# Patient Record
Sex: FEMALE | Race: WHITE | NOT HISPANIC OR LATINO | Employment: FULL TIME | URBAN - METROPOLITAN AREA
[De-identification: names, ages, dates, MRNs, and addresses within clinical notes are randomized per-mention and may not be internally consistent; named-entity substitution may affect disease eponyms.]

---

## 2018-02-07 ENCOUNTER — TELEPHONE (OUTPATIENT)
Dept: INTERNAL MEDICINE | Facility: CLINIC | Age: 58
End: 2018-02-07

## 2018-02-07 DIAGNOSIS — Z12.31 ENCOUNTER FOR SCREENING MAMMOGRAM FOR BREAST CANCER: Primary | ICD-10-CM

## 2018-02-07 NOTE — TELEPHONE ENCOUNTER
----- Message from Mohinder Brooks sent at 2/7/2018  3:48 PM CST -----  Contact: self [phone]  Would like to have orders for annual mammogram.  Please call back at [phone].  Md Kavya

## 2018-02-07 NOTE — TELEPHONE ENCOUNTER
I pended order for mammogram. I didn't sign it because pt's last visit was 11/2016. If it's ok to have mammogram. Please sign the order.

## 2018-02-08 NOTE — TELEPHONE ENCOUNTER
Called pt and booked mammogram for 2-26-18 at 7:45 am at [facility] and annual exam for 2-28-18 at 7:20 am.

## 2018-02-19 ENCOUNTER — PATIENT OUTREACH (OUTPATIENT)
Dept: ADMINISTRATIVE | Facility: HOSPITAL | Age: 58
End: 2018-02-19

## 2018-02-19 DIAGNOSIS — Z00.00 ANNUAL PHYSICAL EXAM: Primary | ICD-10-CM

## 2018-02-26 ENCOUNTER — HOSPITAL ENCOUNTER (OUTPATIENT)
Dept: RADIOLOGY | Facility: HOSPITAL | Age: 58
Discharge: HOME | End: 2018-02-26
Attending: FAMILY MEDICINE
Payer: COMMERCIAL

## 2018-02-26 DIAGNOSIS — Z12.31 ENCOUNTER FOR SCREENING MAMMOGRAM FOR BREAST CANCER: ICD-10-CM

## 2018-02-26 PROCEDURE — 77067 SCR MAMMO BI INCL CAD: CPT | Mod: 26,,, | Performed by: RADIOLOGY

## 2018-02-26 PROCEDURE — 77063 BREAST TOMOSYNTHESIS BI: CPT | Mod: 26,,, | Performed by: RADIOLOGY

## 2018-02-26 PROCEDURE — 77067 SCR MAMMO BI INCL CAD: CPT | Mod: TC,PO

## 2018-02-28 ENCOUNTER — LAB VISIT (OUTPATIENT)
Dept: LAB | Facility: HOSPITAL | Age: 58
End: 2018-02-28
Attending: FAMILY MEDICINE
Payer: COMMERCIAL

## 2018-02-28 ENCOUNTER — OFFICE VISIT (OUTPATIENT)
Dept: INTERNAL MEDICINE | Facility: CLINIC | Age: 58
End: 2018-02-28
Payer: COMMERCIAL

## 2018-02-28 VITALS
HEART RATE: 76 BPM | TEMPERATURE: 98 F | DIASTOLIC BLOOD PRESSURE: 80 MMHG | SYSTOLIC BLOOD PRESSURE: 122 MMHG | BODY MASS INDEX: 34.52 KG/M2 | HEIGHT: 64 IN | WEIGHT: 202.19 LBS

## 2018-02-28 DIAGNOSIS — Z00.00 ROUTINE GENERAL MEDICAL EXAMINATION AT A HEALTH CARE FACILITY: ICD-10-CM

## 2018-02-28 DIAGNOSIS — G89.29 CHRONIC PAIN OF BOTH KNEES: ICD-10-CM

## 2018-02-28 DIAGNOSIS — G47.33 OSA ON CPAP: ICD-10-CM

## 2018-02-28 DIAGNOSIS — M25.562 CHRONIC PAIN OF BOTH KNEES: ICD-10-CM

## 2018-02-28 DIAGNOSIS — Z00.00 ANNUAL PHYSICAL EXAM: ICD-10-CM

## 2018-02-28 DIAGNOSIS — M25.561 CHRONIC PAIN OF BOTH KNEES: ICD-10-CM

## 2018-02-28 DIAGNOSIS — Z00.00 ROUTINE GENERAL MEDICAL EXAMINATION AT A HEALTH CARE FACILITY: Primary | ICD-10-CM

## 2018-02-28 LAB
ALBUMIN SERPL BCP-MCNC: 3.8 G/DL
ALP SERPL-CCNC: 58 U/L
ALT SERPL W/O P-5'-P-CCNC: 21 U/L
ANION GAP SERPL CALC-SCNC: 12 MMOL/L
AST SERPL-CCNC: 24 U/L
BASOPHILS # BLD AUTO: 0.03 K/UL
BASOPHILS NFR BLD: 0.6 %
BILIRUB SERPL-MCNC: 0.6 MG/DL
BUN SERPL-MCNC: 7 MG/DL
CALCIUM SERPL-MCNC: 9.6 MG/DL
CHLORIDE SERPL-SCNC: 104 MMOL/L
CHOLEST SERPL-MCNC: 268 MG/DL
CHOLEST/HDLC SERPL: 5.7 {RATIO}
CO2 SERPL-SCNC: 27 MMOL/L
CREAT SERPL-MCNC: 0.6 MG/DL
DIFFERENTIAL METHOD: NORMAL
EOSINOPHIL # BLD AUTO: 0.2 K/UL
EOSINOPHIL NFR BLD: 2.8 %
ERYTHROCYTE [DISTWIDTH] IN BLOOD BY AUTOMATED COUNT: 13.5 %
EST. GFR  (AFRICAN AMERICAN): >60 ML/MIN/1.73 M^2
EST. GFR  (NON AFRICAN AMERICAN): >60 ML/MIN/1.73 M^2
ESTIMATED AVG GLUCOSE: 148 MG/DL
GLUCOSE SERPL-MCNC: 116 MG/DL
HBA1C MFR BLD HPLC: 6.8 %
HCT VFR BLD AUTO: 38.3 %
HDLC SERPL-MCNC: 47 MG/DL
HDLC SERPL: 17.5 %
HGB BLD-MCNC: 12.5 G/DL
IMM GRANULOCYTES # BLD AUTO: 0.02 K/UL
IMM GRANULOCYTES NFR BLD AUTO: 0.4 %
LDLC SERPL CALC-MCNC: 166 MG/DL
LYMPHOCYTES # BLD AUTO: 2 K/UL
LYMPHOCYTES NFR BLD: 38.3 %
MCH RBC QN AUTO: 30.6 PG
MCHC RBC AUTO-ENTMCNC: 32.6 G/DL
MCV RBC AUTO: 94 FL
MONOCYTES # BLD AUTO: 0.4 K/UL
MONOCYTES NFR BLD: 6.8 %
NEUTROPHILS # BLD AUTO: 2.7 K/UL
NEUTROPHILS NFR BLD: 51.1 %
NONHDLC SERPL-MCNC: 221 MG/DL
NRBC BLD-RTO: 0 /100 WBC
PLATELET # BLD AUTO: 253 K/UL
PMV BLD AUTO: 10.5 FL
POTASSIUM SERPL-SCNC: 4.1 MMOL/L
PROT SERPL-MCNC: 7.2 G/DL
RBC # BLD AUTO: 4.09 M/UL
SODIUM SERPL-SCNC: 143 MMOL/L
TRIGL SERPL-MCNC: 275 MG/DL
TSH SERPL DL<=0.005 MIU/L-ACNC: 1.99 UIU/ML
WBC # BLD AUTO: 5.28 K/UL

## 2018-02-28 PROCEDURE — 84443 ASSAY THYROID STIM HORMONE: CPT

## 2018-02-28 PROCEDURE — 83036 HEMOGLOBIN GLYCOSYLATED A1C: CPT

## 2018-02-28 PROCEDURE — 99396 PREV VISIT EST AGE 40-64: CPT | Mod: S$GLB,,, | Performed by: FAMILY MEDICINE

## 2018-02-28 PROCEDURE — 36415 COLL VENOUS BLD VENIPUNCTURE: CPT | Mod: PO

## 2018-02-28 PROCEDURE — 80053 COMPREHEN METABOLIC PANEL: CPT

## 2018-02-28 PROCEDURE — 80061 LIPID PANEL: CPT

## 2018-02-28 PROCEDURE — 99999 PR PBB SHADOW E&M-EST. PATIENT-LVL III: CPT | Mod: PBBFAC,,, | Performed by: FAMILY MEDICINE

## 2018-02-28 PROCEDURE — 85025 COMPLETE CBC W/AUTO DIFF WBC: CPT

## 2018-02-28 RX ORDER — MELOXICAM 15 MG/1
15 TABLET ORAL DAILY PRN
Qty: 30 TABLET | Refills: 3 | Status: SHIPPED | OUTPATIENT
Start: 2018-02-28 | End: 2019-03-27 | Stop reason: SDUPTHER

## 2018-02-28 NOTE — PROGRESS NOTES
"Subjective:      Patient ID: Shirley Amaya is a 58 y.o. female.    Chief Complaint: Annual Exam    HPI  59 yo female here for annual.  Uses mobic infrequently, needs refill.  Has YUDITH, last test yrs and yrs ago.  Needs updated test/supplies  Up to date on preventive measures  Wants to focus on weight loss    Past Medical History:   Diagnosis Date    OA (osteoarthritis) of knee     YUDITH on CPAP      Family History   Problem Relation Age of Onset    Diabetes Father     Lung cancer Sister     Stomach cancer Brother     Heart disease Neg Hx     Hypertension Neg Hx     Colon cancer Neg Hx     Breast cancer Neg Hx      Past Surgical History:   Procedure Laterality Date    CERVICAL CONIZATION   W/ LASER      COLONOSCOPY N/A 11/30/2016    Procedure: COLONOSCOPY;  Surgeon: Mary Victor MD;  Location: [facility];  Service: Endoscopy;  Laterality: N/A;     Social History   Substance Use Topics    Smoking status: Former Smoker     Packs/day: 2.00     Years: 25.00     Quit date: 4/28/2000    Smokeless tobacco: Never Used    Alcohol use No       /80   Pulse 76   Temp 97.8 °F (36.6 °C) (Tympanic)   Ht 5' 4" (1.626 m)   Wt 91.7 kg (202 lb 2.6 oz)   BMI 34.70 kg/m²     Review of Systems   Constitutional: Negative for activity change, appetite change, chills, diaphoresis, fatigue, fever and unexpected weight change.   HENT: Negative for ear pain, hearing loss, postnasal drip, rhinorrhea and tinnitus.    Eyes: Negative for visual disturbance.   Respiratory: Negative for cough, shortness of breath and wheezing.    Cardiovascular: Negative for chest pain, palpitations and leg swelling.   Gastrointestinal: Negative for abdominal distention, abdominal pain and diarrhea.   Genitourinary: Negative for dysuria, frequency, hematuria and urgency.   Musculoskeletal: Negative for back pain and joint swelling.   Skin: Negative for rash.   Neurological: Negative for weakness and headaches.   Hematological: " Negative for adenopathy.   Psychiatric/Behavioral: Negative for confusion and dysphoric mood.       Objective:     Physical Exam   Constitutional: She is oriented to person, place, and time. She appears well-developed and well-nourished. No distress.   HENT:   Right Ear: External ear normal.   Left Ear: External ear normal.   Nose: Nose normal.   Mouth/Throat: Oropharynx is clear and moist.   Eyes: Conjunctivae are normal. Pupils are equal, round, and reactive to light.   Neck: Normal range of motion. Neck supple. Carotid bruit is not present. No thyromegaly present.   Cardiovascular: Normal rate, regular rhythm and normal heart sounds.  Exam reveals no gallop.    No murmur heard.  Pulmonary/Chest: Effort normal and breath sounds normal. No respiratory distress. She has no wheezes. She has no rales.   Abdominal: Soft. Bowel sounds are normal. She exhibits no distension. There is no tenderness. There is no guarding.   Musculoskeletal: Normal range of motion. She exhibits edema.   BL LE with trace swelling   Lymphadenopathy:     She has no cervical adenopathy.   Neurological: She is alert and oriented to person, place, and time. No cranial nerve deficit.   Skin: Skin is warm and dry. No rash noted.   Psychiatric: She has a normal mood and affect. Her behavior is normal. Judgment and thought content normal.   Nursing note and vitals reviewed.      Lab Results   Component Value Date    WBC 5.13 11/08/2016    HGB 12.1 11/08/2016    HCT 37.8 11/08/2016     11/08/2016    CHOL 253 (H) 11/08/2016    TRIG 265 (H) 11/08/2016    HDL 43 11/08/2016    ALT 25 11/08/2016    AST 26 11/08/2016     11/08/2016    K 4.5 11/08/2016     11/08/2016    CREATININE 0.7 11/08/2016    BUN 8 11/08/2016    CO2 25 11/08/2016    TSH 1.845 11/08/2016       Assessment:     1. Routine general medical examination at a health care facility    2. YUDITH on CPAP    3. Chronic pain of both knees         Plan:     Routine general medical  examination at a health care facility  -     Hemoglobin A1c; Future; Expected date: 08/30/2018    YUDITH on CPAP  -     Ambulatory consult to Sleep Disorders    Chronic pain of both knees  -     meloxicam (MOBIC) 15 MG tablet; Take 1 tablet (15 mg total) by mouth daily as needed for Pain.  Dispense: 30 tablet; Refill: 3    Update labs  Update sleep study/supplies  Focus on more exercise and better eating habits  F/u annually and PRN  Mobic refilled

## 2018-03-12 ENCOUNTER — OFFICE VISIT (OUTPATIENT)
Dept: INTERNAL MEDICINE | Facility: CLINIC | Age: 58
End: 2018-03-12
Payer: COMMERCIAL

## 2018-03-12 VITALS
HEART RATE: 76 BPM | TEMPERATURE: 98 F | DIASTOLIC BLOOD PRESSURE: 80 MMHG | WEIGHT: 196.19 LBS | SYSTOLIC BLOOD PRESSURE: 118 MMHG | HEIGHT: 64 IN | BODY MASS INDEX: 33.49 KG/M2

## 2018-03-12 DIAGNOSIS — E66.9 OBESITY, UNSPECIFIED CLASSIFICATION, UNSPECIFIED OBESITY TYPE, UNSPECIFIED WHETHER SERIOUS COMORBIDITY PRESENT: ICD-10-CM

## 2018-03-12 DIAGNOSIS — R73.09 ABNORMAL GLUCOSE: Primary | ICD-10-CM

## 2018-03-12 DIAGNOSIS — E78.5 HYPERLIPIDEMIA, UNSPECIFIED HYPERLIPIDEMIA TYPE: ICD-10-CM

## 2018-03-12 PROCEDURE — 99999 PR PBB SHADOW E&M-EST. PATIENT-LVL III: CPT | Mod: PBBFAC,,, | Performed by: FAMILY MEDICINE

## 2018-03-12 PROCEDURE — 99214 OFFICE O/P EST MOD 30 MIN: CPT | Mod: S$GLB,,, | Performed by: FAMILY MEDICINE

## 2018-03-12 RX ORDER — METFORMIN HYDROCHLORIDE 500 MG/1
500 TABLET ORAL 2 TIMES DAILY WITH MEALS
Qty: 60 TABLET | Refills: 6 | Status: SHIPPED | OUTPATIENT
Start: 2018-03-12 | End: 2018-11-10 | Stop reason: SDUPTHER

## 2018-03-12 NOTE — PROGRESS NOTES
"Subjective:      Patient ID: Shirley Amaya is a 58 y.o. female.    Chief Complaint:  F/U labs    HPI  57 yo female here for f/u from recent labs for annual.  Sugar was high as was cholesterol.  Since seeing labs, pt has started cutting out her sugar and has lost 6 lbs already.  She is ready to make a lifestyle change, worried about her health.    Past Medical History:   Diagnosis Date    OA (osteoarthritis) of knee     YUDITH on CPAP      Family History   Problem Relation Age of Onset    Diabetes Father     Lung cancer Sister     Stomach cancer Brother     Heart disease Neg Hx     Hypertension Neg Hx     Colon cancer Neg Hx     Breast cancer Neg Hx      Past Surgical History:   Procedure Laterality Date    CERVICAL CONIZATION   W/ LASER      COLONOSCOPY N/A 11/30/2016    Procedure: COLONOSCOPY;  Surgeon: Mary Victor MD;  Location: [facility];  Service: Endoscopy;  Laterality: N/A;     Social History   Substance Use Topics    Smoking status: Former Smoker     Packs/day: 2.00     Years: 25.00     Quit date: 4/28/2000    Smokeless tobacco: Never Used    Alcohol use No       /80   Pulse 76   Temp 98.1 °F (36.7 °C) (Tympanic)   Ht 5' 4" (1.626 m)   Wt 89 kg (196 lb 3.4 oz)   BMI 33.68 kg/m²     Review of Systems   Constitutional: Negative for activity change and unexpected weight change.   HENT: Negative for hearing loss, rhinorrhea and trouble swallowing.    Eyes: Negative for discharge and visual disturbance.   Respiratory: Negative for chest tightness and wheezing.    Cardiovascular: Negative for chest pain and palpitations.   Gastrointestinal: Negative for blood in stool, constipation, diarrhea and vomiting.   Endocrine: Negative for polydipsia and polyuria.   Genitourinary: Negative for difficulty urinating, dysuria, hematuria and menstrual problem.   Musculoskeletal: Negative for arthralgias, joint swelling and neck pain.   Neurological: Negative for weakness and headaches. "   Psychiatric/Behavioral: Negative for confusion and dysphoric mood.       Objective:     Physical Exam   Constitutional: She is oriented to person, place, and time. She appears well-developed and well-nourished.   Neurological: She is alert and oriented to person, place, and time.   Psychiatric: She has a normal mood and affect. Her behavior is normal. Judgment and thought content normal.   Nursing note and vitals reviewed.      Lab Results   Component Value Date    WBC 5.28 02/28/2018    HGB 12.5 02/28/2018    HCT 38.3 02/28/2018     02/28/2018    CHOL 268 (H) 02/28/2018    TRIG 275 (H) 02/28/2018    HDL 47 02/28/2018    ALT 21 02/28/2018    AST 24 02/28/2018     02/28/2018    K 4.1 02/28/2018     02/28/2018    CREATININE 0.6 02/28/2018    BUN 7 02/28/2018    CO2 27 02/28/2018    TSH 1.988 02/28/2018    HGBA1C 6.8 (H) 02/28/2018       Assessment:     1. Abnormal glucose    2. Hyperlipidemia, unspecified hyperlipidemia type    3. Obesity, unspecified classification, unspecified obesity type, unspecified whether serious comorbidity present         Plan:     Abnormal glucose  -     Hemoglobin A1c; Future; Expected date: 09/12/2018    Hyperlipidemia, unspecified hyperlipidemia type  -     Lipid panel; Future; Expected date: 09/12/2018    Obesity, unspecified classification, unspecified obesity type, unspecified whether serious comorbidity present    Other orders  -     metFORMIN (GLUCOPHAGE) 500 MG tablet; Take 1 tablet (500 mg total) by mouth 2 (two) times daily with meals.  Dispense: 60 tablet; Refill: 6    Reviewed labs in detail  Discussed lifestyle changes, including cutting out sugars and following a low carb diet.  Advised pt that she should also follow a low fat/high fiber/plant based diet to help reduce the cholesterol  Increase water intake and exercise  Will start metformin once daily, if tolerable bid.  Avoid on empty stomach  F/u 6 mos with labs prior

## 2018-03-14 ENCOUNTER — PATIENT MESSAGE (OUTPATIENT)
Dept: INTERNAL MEDICINE | Facility: CLINIC | Age: 58
End: 2018-03-14

## 2018-03-20 ENCOUNTER — TELEPHONE (OUTPATIENT)
Dept: PULMONOLOGY | Facility: CLINIC | Age: 58
End: 2018-03-20

## 2018-04-11 ENCOUNTER — OFFICE VISIT (OUTPATIENT)
Dept: SLEEP MEDICINE | Facility: CLINIC | Age: 58
End: 2018-04-11
Payer: COMMERCIAL

## 2018-04-11 VITALS
OXYGEN SATURATION: 95 % | HEIGHT: 64 IN | BODY MASS INDEX: 32.63 KG/M2 | RESPIRATION RATE: 17 BRPM | SYSTOLIC BLOOD PRESSURE: 110 MMHG | DIASTOLIC BLOOD PRESSURE: 76 MMHG | WEIGHT: 191.13 LBS | HEART RATE: 95 BPM

## 2018-04-11 DIAGNOSIS — G47.33 OSA (OBSTRUCTIVE SLEEP APNEA): Primary | ICD-10-CM

## 2018-04-11 PROCEDURE — 99243 OFF/OP CNSLTJ NEW/EST LOW 30: CPT | Mod: S$GLB,,, | Performed by: NURSE PRACTITIONER

## 2018-04-11 PROCEDURE — 99999 PR PBB SHADOW E&M-EST. PATIENT-LVL III: CPT | Mod: PBBFAC,,, | Performed by: NURSE PRACTITIONER

## 2018-04-11 NOTE — PROGRESS NOTES
"Subjective:      Patient ID: Shirley Amaya is a 58 y.o. female.    Chief Complaint: Sleep Apnea    Patient with obstructive sleep apnea on CPAP percent to the office today as a consult.  Patient was diagnosed with sleep apnea 15-16 years ago at an outside facility she wears his CPAP nightly, but needs new supplies in new machine.  She needs an updated sleep study for evaluation.  She benefits greatly from CPAP.  If she does not wear CPAP she has snoring, witnessed apneas, daytime hypersomnolence    Patient Active Problem List:     Chronic pain of both knees     YUDITH on CPAP     Screen for colon cancer            /76   Pulse 95   Resp 17   Ht 5' 4" (1.626 m)   Wt 86.7 kg (191 lb 2.2 oz)   SpO2 95%   BMI 32.81 kg/m²   Body mass index is 32.81 kg/m².    Review of Systems   Respiratory:        See HPI   All other systems reviewed and are negative.    Objective:      Physical Exam   Constitutional: She is oriented to person, place, and time. She appears well-developed and well-nourished.   Obese   HENT:   Head: Normocephalic and atraumatic.   Mouth/Throat: Oropharynx is clear and moist. No oropharyngeal exudate.   Eyes: Right eye exhibits no discharge. Left eye exhibits no discharge.   Neck: Normal range of motion. Neck supple. No tracheal deviation present. No thyromegaly present.   Cardiovascular: Normal rate, regular rhythm and normal heart sounds.  Exam reveals no gallop and no friction rub.    No murmur heard.  Pulmonary/Chest: Effort normal and breath sounds normal. No stridor. No respiratory distress. She has no wheezes. She has no rales.   Abdominal: Soft. She exhibits no distension and no mass. There is no tenderness.   Musculoskeletal: Normal range of motion. She exhibits no edema.   Lymphadenopathy:     She has no cervical adenopathy.   Neurological: She is alert and oriented to person, place, and time. Coordination normal.   Skin: Skin is warm and dry. No rash noted.   Psychiatric: She has " a normal mood and affect.         Assessment:       1. YUDITH (obstructive sleep apnea)        Outpatient Encounter Prescriptions as of 4/11/2018   Medication Sig Dispense Refill    meloxicam (MOBIC) 15 MG tablet Take 1 tablet (15 mg total) by mouth daily as needed for Pain. 30 tablet 3    metFORMIN (GLUCOPHAGE) 500 MG tablet Take 1 tablet (500 mg total) by mouth 2 (two) times daily with meals. 60 tablet 6     No facility-administered encounter medications on file as of 4/11/2018.      Orders Placed This Encounter   Procedures    Polysomnogram (CPAP will be added if patient meets diagnostic criteria.)     Standing Status:   Future     Standing Expiration Date:   4/11/2019     Plan:   Patient with a known sleep apnea diagnosed 15-16 years ago.  Needs updated sleep study to obtain new machine and supplies.  Order for polysomnogram placed.  Follow-up in the clinic after study.    Thank you MD Naif for this consultation.

## 2018-04-11 NOTE — LETTER
April 11, 2018      Milton Monzon MD  [address] [place] [address]           [facility]  [address] [place] [address]  Phone: [phone]          Patient: Shirley Amaya   MR Number: [id number]   YOB: 1960   Date of Visit: 4/11/2018       Dear Dr. Milton Monzon:    Thank you for referring Shirley Amaya to me for evaluation. Attached you will find relevant portions of my assessment and plan of care.    If you have questions, please do not hesitate to call me. I look forward to following Shirley Amaya along with you.    Sincerely,    Elizabeth Lejeune, NP    Enclosure  CC:  No Recipients    If you would like to receive this communication electronically, please contact [email or web address] or [phone] to request more information on [employer] access.    For providers and/or their staff who would like to refer a patient to [facility], please contact us through our one-stop-shop provider referral line, [facility], at [phone].    If you feel you have received this communication in error or would no longer like to receive these types of communications, please e-mail [email or web address]

## 2018-04-20 ENCOUNTER — PATIENT MESSAGE (OUTPATIENT)
Dept: PULMONOLOGY | Facility: CLINIC | Age: 58
End: 2018-04-20

## 2018-04-20 DIAGNOSIS — G47.33 OSA (OBSTRUCTIVE SLEEP APNEA): Primary | ICD-10-CM

## 2018-04-23 ENCOUNTER — TELEPHONE (OUTPATIENT)
Dept: PULMONOLOGY | Facility: HOSPITAL | Age: 58
End: 2018-04-23

## 2018-04-30 ENCOUNTER — PROCEDURE VISIT (OUTPATIENT)
Dept: SLEEP MEDICINE | Facility: CLINIC | Age: 58
End: 2018-04-30
Payer: COMMERCIAL

## 2018-04-30 DIAGNOSIS — G47.33 OSA ON CPAP: Primary | ICD-10-CM

## 2018-04-30 DIAGNOSIS — G47.33 OSA (OBSTRUCTIVE SLEEP APNEA): ICD-10-CM

## 2018-04-30 PROCEDURE — 95806 SLEEP STUDY UNATT&RESP EFFT: CPT | Mod: S$GLB,,, | Performed by: INTERNAL MEDICINE

## 2018-04-30 PROCEDURE — 99499 UNLISTED E&M SERVICE: CPT | Mod: S$GLB,,, | Performed by: INTERNAL MEDICINE

## 2018-04-30 NOTE — PROGRESS NOTES
Assessment and Recommendations  Test duration was 6 hours 36 minutes. This is adequate. Lowest oxygen saturation was 74%. Average heart rate  was 61 bpm. Snoring recorded above 50 dB for 100% of the time.  Apnea-hypopnea index: AHI: 49.2 events per hour. Total events 325  Severe obstructive sleep apnea-hypopnea syndrome.  Treatment with CPAP is still a treatment of choice this patient benefits from it.  Weight loss is recommended  Close follow-up to ensure all symptoms are resolved

## 2018-04-30 NOTE — PROCEDURES
Home Sleep Studies  Date/Time: 4/30/2018 7:36 AM  Performed by: DALIA HUMPHRIES  Authorized by: LEJEUNE, ELIZABETH       Assessment and Recommendations  Test duration was 6 hours 36 minutes. This is adequate. Lowest oxygen saturation was 74%. Average heart rate  was 61 bpm. Snoring recorded above 50 dB for 100% of the time.  Apnea-hypopnea index: AHI: 49.2 events per hour. Total events 325  Severe obstructive sleep apnea-hypopnea syndrome.  Treatment with CPAP is still a treatment of choice this patient benefits from it.  Weight loss is recommended  Close follow-up to ensure all symptoms are resolved

## 2018-05-01 DIAGNOSIS — G47.33 OSA (OBSTRUCTIVE SLEEP APNEA): Primary | ICD-10-CM

## 2018-09-10 ENCOUNTER — LAB VISIT (OUTPATIENT)
Dept: LAB | Facility: HOSPITAL | Age: 58
End: 2018-09-10
Attending: FAMILY MEDICINE
Payer: COMMERCIAL

## 2018-09-10 DIAGNOSIS — R73.09 ABNORMAL GLUCOSE: ICD-10-CM

## 2018-09-10 DIAGNOSIS — E78.5 HYPERLIPIDEMIA, UNSPECIFIED HYPERLIPIDEMIA TYPE: ICD-10-CM

## 2018-09-10 PROCEDURE — 83036 HEMOGLOBIN GLYCOSYLATED A1C: CPT

## 2018-09-10 PROCEDURE — 36415 COLL VENOUS BLD VENIPUNCTURE: CPT | Mod: PO

## 2018-09-10 PROCEDURE — 80061 LIPID PANEL: CPT

## 2018-09-11 LAB
CHOLEST SERPL-MCNC: 271 MG/DL
CHOLEST/HDLC SERPL: 5.5 {RATIO}
ESTIMATED AVG GLUCOSE: 120 MG/DL
HBA1C MFR BLD HPLC: 5.8 %
HDLC SERPL-MCNC: 49 MG/DL
HDLC SERPL: 18.1 %
LDLC SERPL CALC-MCNC: 192.2 MG/DL
NONHDLC SERPL-MCNC: 222 MG/DL
TRIGL SERPL-MCNC: 149 MG/DL

## 2018-09-17 ENCOUNTER — OFFICE VISIT (OUTPATIENT)
Dept: INTERNAL MEDICINE | Facility: CLINIC | Age: 58
End: 2018-09-17
Payer: COMMERCIAL

## 2018-09-17 VITALS
HEART RATE: 78 BPM | SYSTOLIC BLOOD PRESSURE: 114 MMHG | WEIGHT: 186.5 LBS | DIASTOLIC BLOOD PRESSURE: 84 MMHG | HEIGHT: 64 IN | BODY MASS INDEX: 31.84 KG/M2 | TEMPERATURE: 98 F

## 2018-09-17 DIAGNOSIS — E78.5 HYPERLIPIDEMIA, UNSPECIFIED HYPERLIPIDEMIA TYPE: Primary | ICD-10-CM

## 2018-09-17 DIAGNOSIS — E66.9 OBESITY, UNSPECIFIED CLASSIFICATION, UNSPECIFIED OBESITY TYPE, UNSPECIFIED WHETHER SERIOUS COMORBIDITY PRESENT: ICD-10-CM

## 2018-09-17 DIAGNOSIS — Z29.9 PREVENTIVE MEASURE: ICD-10-CM

## 2018-09-17 DIAGNOSIS — R73.09 ABNORMAL GLUCOSE: ICD-10-CM

## 2018-09-17 PROCEDURE — 99214 OFFICE O/P EST MOD 30 MIN: CPT | Mod: S$GLB,,, | Performed by: FAMILY MEDICINE

## 2018-09-17 PROCEDURE — 99999 PR PBB SHADOW E&M-EST. PATIENT-LVL III: CPT | Mod: PBBFAC,,, | Performed by: FAMILY MEDICINE

## 2018-09-17 PROCEDURE — 3008F BODY MASS INDEX DOCD: CPT | Mod: CPTII,S$GLB,, | Performed by: FAMILY MEDICINE

## 2018-09-17 RX ORDER — ATORVASTATIN CALCIUM 20 MG/1
20 TABLET, FILM COATED ORAL DAILY
Qty: 90 TABLET | Refills: 3 | Status: SHIPPED | OUTPATIENT
Start: 2018-09-17 | End: 2019-03-27 | Stop reason: SDUPTHER

## 2018-09-17 NOTE — PROGRESS NOTES
"Subjective:      Patient ID: Shirley Amaya is a 58 y.o. female.    Chief Complaint:  F/u sugar/lipids/weight     HPI  59 yo female here for f/u on chronic conditions.  Taking 1000mg of metformin daily.  Down nearly 20 lbs over this year.  Eating better, feeling better with more energy.  Tolerating metformin, normal BMs.  Using CpAP  Lipids up, has been on statin in past.  Tolerated fine    Past Medical History:   Diagnosis Date    Abnormal glucose     Hyperlipidemia     OA (osteoarthritis) of knee     YUDITH on CPAP      Family History   Problem Relation Age of Onset    Diabetes Father     Lung cancer Sister     Stomach cancer Brother     Heart disease Neg Hx     Hypertension Neg Hx     Colon cancer Neg Hx     Breast cancer Neg Hx      Past Surgical History:   Procedure Laterality Date    CERVICAL CONIZATION   W/ LASER      COLONOSCOPY N/A 2016    Procedure: COLONOSCOPY;  Surgeon: Mary Victor MD;  Location: [facility];  Service: Endoscopy;  Laterality: N/A;    COLONOSCOPY N/A 2016    Performed by Mary Victor MD at [facility]     Social History     Tobacco Use    Smoking status: Former Smoker     Packs/day: 2.00     Years: 25.00     Pack years: 50.00     Last attempt to quit: 2000     Years since quittin.4    Smokeless tobacco: Never Used   Substance Use Topics    Alcohol use: No    Drug use: No       /84 (BP Location: Right arm, Patient Position: Sitting, BP Method: Large (Manual))   Pulse 78   Temp 98.4 °F (36.9 °C) (Tympanic)   Ht 5' 4" (1.626 m)   Wt 84.6 kg (186 lb 8.2 oz)   BMI 32.01 kg/m²     Review of Systems   Constitutional: Negative for activity change and unexpected weight change.   HENT: Negative for hearing loss, rhinorrhea and trouble swallowing.    Eyes: Negative for discharge and visual disturbance.   Respiratory: Negative for chest tightness and wheezing.    Cardiovascular: Negative for chest pain and palpitations. "   Gastrointestinal: Negative for blood in stool, constipation, diarrhea and vomiting.   Endocrine: Negative for polydipsia and polyuria.   Genitourinary: Negative for difficulty urinating, dysuria, hematuria and menstrual problem.   Musculoskeletal: Negative for arthralgias, joint swelling and neck pain.   Neurological: Negative for weakness and headaches.   Psychiatric/Behavioral: Negative for confusion and dysphoric mood.       Objective:     Physical Exam   Constitutional: She is oriented to person, place, and time. She appears well-developed and well-nourished. No distress.   HENT:   Right Ear: External ear normal.   Left Ear: External ear normal.   Nose: Nose normal.   Mouth/Throat: Oropharynx is clear and moist.   Eyes: Conjunctivae are normal. Pupils are equal, round, and reactive to light.   Neck: Normal range of motion. Neck supple. Carotid bruit is not present.   Cardiovascular: Normal rate, regular rhythm and normal heart sounds.   Pulmonary/Chest: Effort normal and breath sounds normal. No respiratory distress. She has no wheezes. She has no rales.   Abdominal: Soft. Bowel sounds are normal. She exhibits no distension. There is no tenderness. There is no guarding.   Musculoskeletal: She exhibits no edema.   Lymphadenopathy:     She has no cervical adenopathy.   Neurological: She is alert and oriented to person, place, and time. No cranial nerve deficit.   Skin: Skin is warm and dry. No rash noted.   Psychiatric: She has a normal mood and affect. Her behavior is normal. Judgment and thought content normal.   Nursing note and vitals reviewed.      Lab Results   Component Value Date    WBC 5.28 02/28/2018    HGB 12.5 02/28/2018    HCT 38.3 02/28/2018     02/28/2018    CHOL 271 (H) 09/10/2018    TRIG 149 09/10/2018    HDL 49 09/10/2018    ALT 21 02/28/2018    AST 24 02/28/2018     02/28/2018    K 4.1 02/28/2018     02/28/2018    CREATININE 0.6 02/28/2018    BUN 7 02/28/2018    CO2 27  02/28/2018    TSH 1.988 02/28/2018    HGBA1C 5.8 (H) 09/10/2018       Assessment:     1. Hyperlipidemia, unspecified hyperlipidemia type    2. Abnormal glucose    3. Obesity, unspecified classification, unspecified obesity type, unspecified whether serious comorbidity present    4. Preventive measure         Plan:     Hyperlipidemia, unspecified hyperlipidemia type  -     Lipid panel; Future; Expected date: 12/10/2018  -     Comprehensive metabolic panel; Future; Expected date: 12/10/2018    Abnormal glucose    Obesity, unspecified classification, unspecified obesity type, unspecified whether serious comorbidity present    Preventive measure  -     CBC auto differential; Future; Expected date: 03/17/2019  -     Comprehensive metabolic panel; Future; Expected date: 03/17/2019  -     Hemoglobin A1c; Future; Expected date: 03/17/2019  -     Lipid panel; Future; Expected date: 03/17/2019  -     TSH; Future; Expected date: 03/17/2019    Other orders  -     atorvastatin (LIPITOR) 20 MG tablet; Take 1 tablet (20 mg total) by mouth once daily.  Dispense: 90 tablet; Refill: 3    Normal BP  Weight coming down, goal BMI under 30  A1C is coming down, cont metformin 1000mg daily  Start lipitor 20mg nightly  Low fat diet  Cont with CPAP  F/u 6 mos  Recheck lipid/cmp in 12 wks

## 2018-11-11 RX ORDER — METFORMIN HYDROCHLORIDE 500 MG/1
TABLET ORAL
Qty: 60 TABLET | Refills: 6 | Status: SHIPPED | OUTPATIENT
Start: 2018-11-11 | End: 2019-03-27 | Stop reason: SINTOL

## 2018-11-24 ENCOUNTER — OFFICE VISIT (OUTPATIENT)
Dept: OBSTETRICS AND GYNECOLOGY | Facility: CLINIC | Age: 58
End: 2018-11-24
Payer: COMMERCIAL

## 2018-11-24 VITALS
SYSTOLIC BLOOD PRESSURE: 138 MMHG | DIASTOLIC BLOOD PRESSURE: 74 MMHG | BODY MASS INDEX: 31.84 KG/M2 | HEIGHT: 64 IN | WEIGHT: 186.5 LBS

## 2018-11-24 DIAGNOSIS — Z00.00 PREVENTATIVE HEALTH CARE: ICD-10-CM

## 2018-11-24 DIAGNOSIS — Z01.419 PAP SMEAR, AS PART OF ROUTINE GYNECOLOGICAL EXAMINATION: Primary | ICD-10-CM

## 2018-11-24 DIAGNOSIS — N95.1 MENOPAUSAL STATE: ICD-10-CM

## 2018-11-24 PROCEDURE — 88175 CYTOPATH C/V AUTO FLUID REDO: CPT

## 2018-11-24 PROCEDURE — 87624 HPV HI-RISK TYP POOLED RSLT: CPT

## 2018-11-24 PROCEDURE — 3008F BODY MASS INDEX DOCD: CPT | Mod: CPTII,S$GLB,, | Performed by: NURSE PRACTITIONER

## 2018-11-24 PROCEDURE — 99386 PREV VISIT NEW AGE 40-64: CPT | Mod: S$GLB,,, | Performed by: NURSE PRACTITIONER

## 2018-11-24 PROCEDURE — 99999 PR PBB SHADOW E&M-EST. PATIENT-LVL III: CPT | Mod: PBBFAC,,, | Performed by: NURSE PRACTITIONER

## 2018-11-24 RX ORDER — NYSTATIN AND TRIAMCINOLONE ACETONIDE 100000; 1 [USP'U]/G; MG/G
CREAM TOPICAL
Qty: 30 G | Refills: 1 | Status: SHIPPED | OUTPATIENT
Start: 2018-11-24 | End: 2019-06-29

## 2018-11-24 NOTE — PROGRESS NOTES
CC: Well woman exam    Shirley Amaya is a 58 y.o. female  presents for well woman exam.  LMP: . No AUB. Last pap exam was normal. Last mammogram was normal. Done scan, , indicated osteopenia. Is sexually active, c/o vaginal dryness.     Past Medical History:   Diagnosis Date    Abnormal glucose     Hyperlipidemia     OA (osteoarthritis) of knee     YUDITH on CPAP      Past Surgical History:   Procedure Laterality Date    CERVICAL CONIZATION   W/ LASER      COLONOSCOPY N/A 2016    Procedure: COLONOSCOPY;  Surgeon: Mary Victor MD;  Location: [facility];  Service: Endoscopy;  Laterality: N/A;    COLONOSCOPY N/A 2016    Performed by Mary Victor MD at [facility]     Social History     Socioeconomic History    Marital status:      Spouse name: Not on file    Number of children: 2    Years of education: Not on file    Highest education level: Not on file   Social Needs    Financial resource strain: Not on file    Food insecurity - worry: Not on file    Food insecurity - inability: Not on file    Transportation needs - medical: Not on file    Transportation needs - non-medical: Not on file   Occupational History    Occupation: [employer]   Tobacco Use    Smoking status: Former Smoker     Packs/day: 2.00     Years: 25.00     Pack years: 50.00     Last attempt to quit: 2000     Years since quittin.5    Smokeless tobacco: Never Used   Substance and Sexual Activity    Alcohol use: No    Drug use: No    Sexual activity: Yes     Partners: Male   Other Topics Concern    Not on file   Social History Narrative    Not on file     Family History   Problem Relation Age of Onset    Diabetes Father     Lung cancer Sister     Stomach cancer Brother     Heart disease Neg Hx     Hypertension Neg Hx     Colon cancer Neg Hx     Breast cancer Neg Hx      OB History      Para Term  AB Living    2 2 2     2    SAB TAB Ectopic Multiple  "Live Births            2          /74   Ht 5' 4" (1.626 m)   Wt 84.6 kg (186 lb 8.2 oz)   BMI 32.01 kg/m²       ROS:  GENERAL: Denies weight gain or weight loss. Feeling well overall.   SKIN: Denies rash or lesions.   HEAD: Denies head injury or headache.   NODES: Denies enlarged lymph nodes.   CHEST: Denies chest pain or shortness of breath.   CARDIOVASCULAR: Denies palpitations or left sided chest pain.   ABDOMEN: No abdominal pain, constipation, diarrhea, nausea, vomiting or rectal bleeding.   URINARY: No frequency, dysuria, hematuria, or burning on urination.  REPRODUCTIVE: See HPI.   BREASTS: The patient performs breast self-examination and denies pain, lumps, or nipple discharge.   HEMATOLOGIC: No easy bruisability or excessive bleeding.   MUSCULOSKELETAL: Denies joint pain or swelling.   NEUROLOGIC: Denies syncope or weakness.   PSYCHIATRIC: Denies depression, anxiety or mood swings.    PHYSICAL EXAM:  APPEARANCE: Well nourished, well developed, in no acute distress.  AFFECT: WNL, alert and oriented x 3  SKIN: No acne or hirsutism  NECK: Neck symmetric without masses or thyromegaly  NODES: No inguinal, cervical, axillary, or femoral lymph node enlargement  CHEST: Good respiratory effect  ABDOMEN: Soft.  No tenderness or masses.  No hepatosplenomegaly.  No hernias.  BREASTS: Symmetrical, no skin changes or visible lesions.  No palpable masses, nipple discharge bilaterally.  PELVIC: xternal genitalia, erythema. Normal hair distribution.  Adequate perineal body, normal urethral meatus.  Vagina atrophy without lesions or discharge.  Cervix pink, without lesions, discharge or tenderness.  No significant cystocele or rectocele.  Bimanual exam shows uterus to be normal size, regular, mobile and nontender.  Adnexa without masses or tenderness.    EXTREMITIES: No edema.    1. Pap smear, as part of routine gynecological examination  DXA Bone Density Spine And Hip    Liquid-based pap smear, screening    HPV " High Risk Genotypes, PCR   2. Preventative health care  DXA Bone Density Spine And Hip    Liquid-based pap smear, screening    HPV High Risk Genotypes, PCR   3. Menopausal state  DXA Bone Density Spine And Hip    PLAN:  Pap exam  Mycolog cream  Patient will e-mail as a f/u to rash. Will consider Estrace cream after resolution of rash.  Bone scan      Patient was counseled today on A.C.S. Pap guidelines and recommendations for yearly pelvic exams, mammograms and monthly self breast exams; to see her PCP for other health maintenance.

## 2018-11-26 ENCOUNTER — TELEPHONE (OUTPATIENT)
Dept: OBSTETRICS AND GYNECOLOGY | Facility: CLINIC | Age: 58
End: 2018-11-26

## 2018-11-26 ENCOUNTER — APPOINTMENT (OUTPATIENT)
Dept: RADIOLOGY | Facility: HOSPITAL | Age: 58
End: 2018-11-26
Attending: NURSE PRACTITIONER
Payer: COMMERCIAL

## 2018-11-26 ENCOUNTER — PATIENT MESSAGE (OUTPATIENT)
Dept: OBSTETRICS AND GYNECOLOGY | Facility: CLINIC | Age: 58
End: 2018-11-26

## 2018-11-26 DIAGNOSIS — Z01.419 PAP SMEAR, AS PART OF ROUTINE GYNECOLOGICAL EXAMINATION: ICD-10-CM

## 2018-11-26 DIAGNOSIS — N95.1 MENOPAUSAL STATE: ICD-10-CM

## 2018-11-26 DIAGNOSIS — Z00.00 PREVENTATIVE HEALTH CARE: ICD-10-CM

## 2018-11-26 PROCEDURE — 77080 DXA BONE DENSITY AXIAL: CPT | Mod: 26,,, | Performed by: RADIOLOGY

## 2018-11-26 PROCEDURE — 77080 DXA BONE DENSITY AXIAL: CPT | Mod: TC,PO

## 2018-11-26 NOTE — PROGRESS NOTES
Please call patient and inform her that dexa scan indicated Osteopenia. She needs to be taking daily Vit. D and calcium. She should be exercising ( weight bearing).

## 2018-11-28 LAB
HPV HR 12 DNA CVX QL NAA+PROBE: NEGATIVE
HPV16 AG SPEC QL: NEGATIVE
HPV18 DNA SPEC QL NAA+PROBE: NEGATIVE

## 2018-12-17 ENCOUNTER — LAB VISIT (OUTPATIENT)
Dept: LAB | Facility: HOSPITAL | Age: 58
End: 2018-12-17
Attending: FAMILY MEDICINE
Payer: COMMERCIAL

## 2018-12-17 DIAGNOSIS — E78.5 HYPERLIPIDEMIA, UNSPECIFIED HYPERLIPIDEMIA TYPE: ICD-10-CM

## 2018-12-17 LAB
ALBUMIN SERPL BCP-MCNC: 4.2 G/DL
ALP SERPL-CCNC: 60 U/L
ALT SERPL W/O P-5'-P-CCNC: 18 U/L
ANION GAP SERPL CALC-SCNC: 9 MMOL/L
AST SERPL-CCNC: 20 U/L
BILIRUB SERPL-MCNC: 0.8 MG/DL
BUN SERPL-MCNC: 13 MG/DL
CALCIUM SERPL-MCNC: 10 MG/DL
CHLORIDE SERPL-SCNC: 102 MMOL/L
CHOLEST SERPL-MCNC: 146 MG/DL
CHOLEST/HDLC SERPL: 2.9 {RATIO}
CO2 SERPL-SCNC: 31 MMOL/L
CREAT SERPL-MCNC: 0.6 MG/DL
EST. GFR  (AFRICAN AMERICAN): >60 ML/MIN/1.73 M^2
EST. GFR  (NON AFRICAN AMERICAN): >60 ML/MIN/1.73 M^2
GLUCOSE SERPL-MCNC: 100 MG/DL
HDLC SERPL-MCNC: 50 MG/DL
HDLC SERPL: 34.2 %
LDLC SERPL CALC-MCNC: 72.4 MG/DL
NONHDLC SERPL-MCNC: 96 MG/DL
POTASSIUM SERPL-SCNC: 4.4 MMOL/L
PROT SERPL-MCNC: 7.5 G/DL
SODIUM SERPL-SCNC: 142 MMOL/L
TRIGL SERPL-MCNC: 118 MG/DL

## 2018-12-17 PROCEDURE — 80061 LIPID PANEL: CPT

## 2018-12-17 PROCEDURE — 36415 COLL VENOUS BLD VENIPUNCTURE: CPT | Mod: PO

## 2018-12-17 PROCEDURE — 80053 COMPREHEN METABOLIC PANEL: CPT

## 2018-12-21 ENCOUNTER — PATIENT MESSAGE (OUTPATIENT)
Dept: OBSTETRICS AND GYNECOLOGY | Facility: CLINIC | Age: 58
End: 2018-12-21

## 2019-02-06 ENCOUNTER — OFFICE VISIT (OUTPATIENT)
Dept: URGENT CARE | Facility: CLINIC | Age: 59
End: 2019-02-06
Payer: COMMERCIAL

## 2019-02-06 VITALS
RESPIRATION RATE: 20 BRPM | OXYGEN SATURATION: 95 % | SYSTOLIC BLOOD PRESSURE: 144 MMHG | TEMPERATURE: 98 F | HEART RATE: 77 BPM | DIASTOLIC BLOOD PRESSURE: 72 MMHG | WEIGHT: 186.5 LBS | BODY MASS INDEX: 32.01 KG/M2

## 2019-02-06 DIAGNOSIS — T78.40XA HYPERSENSITIVITY REACTION, INITIAL ENCOUNTER: ICD-10-CM

## 2019-02-06 DIAGNOSIS — R03.0 ELEVATED BLOOD-PRESSURE READING WITHOUT DIAGNOSIS OF HYPERTENSION: Primary | ICD-10-CM

## 2019-02-06 PROCEDURE — 99214 PR OFFICE/OUTPT VISIT, EST, LEVL IV, 30-39 MIN: ICD-10-PCS | Mod: S$GLB,,, | Performed by: NURSE PRACTITIONER

## 2019-02-06 PROCEDURE — 99999 PR PBB SHADOW E&M-EST. PATIENT-LVL III: ICD-10-PCS | Mod: PBBFAC,,, | Performed by: NURSE PRACTITIONER

## 2019-02-06 PROCEDURE — 3008F PR BODY MASS INDEX (BMI) DOCUMENTED: ICD-10-PCS | Mod: CPTII,S$GLB,, | Performed by: NURSE PRACTITIONER

## 2019-02-06 PROCEDURE — 99999 PR PBB SHADOW E&M-EST. PATIENT-LVL III: CPT | Mod: PBBFAC,,, | Performed by: NURSE PRACTITIONER

## 2019-02-06 PROCEDURE — 99214 OFFICE O/P EST MOD 30 MIN: CPT | Mod: S$GLB,,, | Performed by: NURSE PRACTITIONER

## 2019-02-06 PROCEDURE — 3008F BODY MASS INDEX DOCD: CPT | Mod: CPTII,S$GLB,, | Performed by: NURSE PRACTITIONER

## 2019-02-06 NOTE — PROGRESS NOTES
Subjective:      Patient ID: Shirley Amaya is a 59 y.o. female.    Chief Complaint: Hypertension (flushed skin)      Hypertension   This is a new problem. Pertinent negatives include no chest pain, headaches, palpitations or shortness of breath.     Review of Systems   Constitutional: Negative for activity change, appetite change, chills, diaphoresis, fatigue and fever.   HENT: Negative for congestion, drooling, ear pain, facial swelling, sinus pressure, sinus pain, sore throat, trouble swallowing and voice change.    Eyes: Negative for pain, discharge, redness and itching.   Respiratory: Negative for chest tightness and shortness of breath.    Cardiovascular: Negative for chest pain and palpitations.   Gastrointestinal: Negative for abdominal pain, diarrhea, nausea and vomiting.   Endocrine: Negative for cold intolerance and heat intolerance.   Genitourinary: Negative for decreased urine volume, difficulty urinating and dysuria.   Musculoskeletal: Negative for myalgias.   Skin: Positive for color change (flush to face). Negative for rash.   Allergic/Immunologic: Negative for environmental allergies and food allergies.   Neurological: Negative for dizziness, speech difficulty, weakness, light-headedness, numbness and headaches.   Hematological: Negative for adenopathy.   Psychiatric/Behavioral: Negative for agitation.        Objective:     Vitals:    02/06/19 1554   BP: (!) 144/72   Pulse: 77   Resp: 20   Temp: 98.2 °F (36.8 °C)     Physical Exam   Constitutional: She is oriented to person, place, and time. She appears well-developed and well-nourished. She is cooperative. She does not appear ill. No distress.   HENT:   Head: Normocephalic.   Right Ear: Hearing and external ear normal.   Left Ear: Hearing and external ear normal.   Nose: Nose normal. Right sinus exhibits no maxillary sinus tenderness and no frontal sinus tenderness. Left sinus exhibits no maxillary sinus tenderness and no frontal sinus  tenderness.   Mouth/Throat: Uvula is midline, oropharynx is clear and moist and mucous membranes are normal. No oral lesions. No uvula swelling. No oropharyngeal exudate, posterior oropharyngeal edema, posterior oropharyngeal erythema or tonsillar abscesses.   Eyes: Conjunctivae, EOM and lids are normal. Pupils are equal, round, and reactive to light. Right eye exhibits no discharge. Left eye exhibits no discharge.   Neck: Trachea normal, normal range of motion and full passive range of motion without pain. Neck supple. No edema and no erythema present.   Cardiovascular: Normal rate, regular rhythm and normal heart sounds.   Pulmonary/Chest: Effort normal and breath sounds normal. No accessory muscle usage. No tachypnea and no bradypnea. No respiratory distress.   Abdominal: Soft. Bowel sounds are normal.   Musculoskeletal: Normal range of motion.   Neurological: She is alert and oriented to person, place, and time.   Skin: Skin is warm, dry and intact. Capillary refill takes less than 2 seconds. No bruising, no ecchymosis, no petechiae and no rash noted. There is erythema (forehead both cheeks, nose, chin).   Nursing note and vitals reviewed.      Assessment:     1. Elevated blood-pressure reading without diagnosis of hypertension    2. Hypersensitivity reaction, initial encounter        Plan:   Shirley was seen today for hypertension.    Diagnoses and all orders for this visit:    Elevated blood-pressure reading without diagnosis of hypertension    Hypersensitivity reaction, initial encounter    Follow prescribed treatment plan as directed.  Stay hydrated and rest.  Report to ER if symptoms worsen. Red flag signs, breathing difficulty, swelling of throat or tongue, facial redness worsens, call 911 or go to nearest ER  Follow up with Lily Randle Thursday or Friday or STACEY Kaur on Monday or sooner if symptoms do not improve.    Elevated Blood Pressure    Get a blood pressure monitor from your local pharmacy and check  "your blood pressure every morning.  Make sure your feet are flat on the floor and you have been sitting for 15 minutes before checking the blood pressure.  Follow a diet low in SODIUM.  This includes all fast food, canned foods, processed foods, and foods with preservatives such as cheese.  Eliminate caffeine and other "energy drinks"  Drink at least 64 ounces of water a day  Exercise for at least 30 minutes 5 days per week    Follow up with your Primary Care Provider to re-check your blood pressure and to get established with one of our providers in the next week.  Bring your blood pressure diary with you to this appointment.            JOVITA Raygoza, FNP-C  "

## 2019-02-06 NOTE — PATIENT INSTRUCTIONS
First Aid: Allergic Reactions  Limited reaction  A limited (localized) reaction affects only the area of contact. Some reactions may not show up for days. Others can occur almost immediately.  Step 1  Stop the source  · If the person has been stung, scrape the stinger away with the edge of a credit card or the dull edge of a knife. Dont use fingers or tweezers to remove a stinger. If pinched, the stinger may empty its venom into the skin.  · If the reaction is caused by eating a specific food or taking a medicine, the person should not eat or take the substance again.  Step 2  Treat skin irritation  · Wash insect bites with soap and water.  · Remove and wash in hot water all clothing that may have plant oils (or any other substance that has caused a reaction) on them. Shower with plenty of soap to wash remaining plant oils (or other allergens) off the skin.  · Control itching by making a thick paste of baking soda and water. Apply the paste directly to the skin.  Severe reaction  A severe (systemic) reaction affects the entire body. In extreme cases, the airways from mouth to lungs may swell (anaphylaxis). The reaction may happen right away or over several hours.  Step 1  Calm the person  · Help the person into a comfortable position. Prop up his or her head to aid breathing.  · Tell the person to remain still and limit talking.  · If the person carries medicine (epinephrine) to control anaphylaxis, help him or her use it.  · Prevent any further contact with or exposure to allergen.   Step 2  Monitor breathing  · Watch for signs of airway swelling such as wheezing or swollen lips. With an extreme reaction, the person may have trouble getting any breath.  · Perform rescue breathing, if needed. In extreme cases, you may not be able to get air into the lungs.  Call 911 immediately if the person has any of the following:  · Trouble breathing  · A history of airway swelling (anaphylaxis)  While you wait for  help:  · Reassure the person.  · Treat for shock or provide rescue breathing or CPR, if needed.   An allergic reaction may become more serious over time. Seek medical help if any of the following is true:  · A rash or hives covers the face, genitals, or most of the body.  · An entire body part, such as an arm or leg, swells.  · The tongue or lips begin to swell.   Date Last Reviewed: 12/1/2016 © 2000-2017 [employer]. [address] [place] [address]. All rights reserved. This information is not intended as a substitute for professional medical care. Always follow your healthcare professional's instructions.        High Blood Pressure, To Be Confirmed, No Treatment  Your blood pressure today was higher than normal. Sometimes anxiety or pain can cause a temporary rise in blood pressure. It later returns to normal. Blood pressure that is high only one time doesnt mean that you have high blood pressure (hypertension). High blood pressure is a chronic illness. But you should have your blood pressure measured again within the next few days to find out if its still high.    A blood pressure reading is made up of two numbers: a higher number over a lower number. The top number is the systolic pressure. The bottom number is the diastolic pressure. A normal blood pressure is a systolic pressure of less than 120 over a diastolic pressure of less than 80. You will see your blood pressure readings written together. For example, a person with a systolic pressure of 118 and a diastolic pressure of 78 will have 118/78 written in the medical record.     High blood pressure is when either the top number is 140 or higher, or the bottom number is 90 or higher. This must be the result when taking your blood pressure a number of times.   The blood pressures between normal and high are called prehypertension. This is systolic pressure of 120 to 140 or diastolic pressure of 80 to 89. Prehypertension means you are  at risk of getting high blood pressure. It's a warning sign. The information gives you a chance to  make lifestyle changes such as weight loss, exercise, and quitting smoking, that can keep your blood pressure from going higher. You should have your blood pressure checked regularly to be sure it isnt rising.  Home care  To track your blood pressure, your provider may ask you to come into the office at different times and on different days. If your healthcare provider asks you to check your readings at home, ask him or her what times of the day to test and for how many days. Before you leave the office, ask your provider to show you how to take your blood pressure and be sure to ask questions if you don't understand something.  Consider buying an automatic blood pressure monitor. Ask your provider for a recommendation. You can buy blood pressure monitors at most pharmacies.  The American Heart Association recommends the following guidelines for home blood pressure monitoring:  · Don't smoke or drink coffee for 30 minutes before taking your blood pressure.  · Go to the bathroom before the test.  · Relax for 5 minutes before taking the measurement.  · Sit with your back supported (don't sit on a couch or soft chair); keep your feet on the floor uncrossed. Place your arm on a solid flat surface (like a table) with the upper part of the arm at heart level. Place the middle of the cuff directly above the eye of the elbow. Check the monitor's instruction manual for an illustration.  · Take multiple readings. When you measure, take 2 to 3 readings one minute apart and record all of the results.  · Take your blood pressure at the same time every day, or as your healthcare provider recommends.  · Record the date, time, and blood pressure reading.  · Take the record with you to your next medical appointment. If your blood pressure monitor has a built-in memory, simply take the monitor with you to your next  appointment.  · Call your provider if you have several high readings. Don't be frightened by a single high blood pressure reading, but if you get several high readings, check in with your healthcare provider.  · Note: When blood pressure reaches a systolic (top number) of 180 or higher OR diastolic (bottom number) of 110 or higher, seek emergency medical treatment.  Follow-up care  Keep all of your follow up appointments. If your blood pressure is high (more than 120 over 80) on 2 out of 3 days, you will need to follow up with your healthcare provider for more evaluation and treatment.  Dont put this off! High blood pressure can be treated. High blood pressure thats not treated raises your risk for heart attack and stroke.  When to seek medical advice  Call your healthcare provider right away if any of these occur:  · Blood pressure reaches a systolic (top number) of 180 or higher, OR diastolic (bottom number) of 110 or higher  · Chest pain or shortness of breath  · Severe headache  · Throbbing or rushing sound in the ears  · Nosebleed  · Sudden severe pain in your belly (abdomen)  · Extreme drowsiness, confusion, or fainting  · Dizziness or dizziness with spinning sensation (vertigo)  · Weakness of an arm or leg or one side of the face  · You have problems speaking or seeing   Date Last Reviewed: 12/1/2016  © 9486-9164 [employer]. [address] [place] [address], [place] [address]. All rights reserved. This information is not intended as a substitute for professional medical care. Always follow your healthcare professional's instructions.

## 2019-03-11 ENCOUNTER — LAB VISIT (OUTPATIENT)
Dept: LAB | Facility: HOSPITAL | Age: 59
End: 2019-03-11
Attending: FAMILY MEDICINE
Payer: COMMERCIAL

## 2019-03-11 DIAGNOSIS — Z29.9 PREVENTIVE MEASURE: ICD-10-CM

## 2019-03-11 LAB
ALBUMIN SERPL BCP-MCNC: 4.1 G/DL
ALP SERPL-CCNC: 56 U/L
ALT SERPL W/O P-5'-P-CCNC: 15 U/L
ANION GAP SERPL CALC-SCNC: 9 MMOL/L
AST SERPL-CCNC: 19 U/L
BASOPHILS # BLD AUTO: 0.05 K/UL
BASOPHILS NFR BLD: 0.8 %
BILIRUB SERPL-MCNC: 0.8 MG/DL
BUN SERPL-MCNC: 18 MG/DL
CALCIUM SERPL-MCNC: 9.9 MG/DL
CHLORIDE SERPL-SCNC: 104 MMOL/L
CHOLEST SERPL-MCNC: 164 MG/DL
CHOLEST/HDLC SERPL: 3 {RATIO}
CO2 SERPL-SCNC: 28 MMOL/L
CREAT SERPL-MCNC: 0.6 MG/DL
DIFFERENTIAL METHOD: NORMAL
EOSINOPHIL # BLD AUTO: 0.2 K/UL
EOSINOPHIL NFR BLD: 2.9 %
ERYTHROCYTE [DISTWIDTH] IN BLOOD BY AUTOMATED COUNT: 13.3 %
EST. GFR  (AFRICAN AMERICAN): >60 ML/MIN/1.73 M^2
EST. GFR  (NON AFRICAN AMERICAN): >60 ML/MIN/1.73 M^2
ESTIMATED AVG GLUCOSE: 128 MG/DL
GLUCOSE SERPL-MCNC: 93 MG/DL
HBA1C MFR BLD HPLC: 6.1 %
HCT VFR BLD AUTO: 38 %
HDLC SERPL-MCNC: 54 MG/DL
HDLC SERPL: 32.9 %
HGB BLD-MCNC: 12.2 G/DL
IMM GRANULOCYTES # BLD AUTO: 0.01 K/UL
IMM GRANULOCYTES NFR BLD AUTO: 0.2 %
LDLC SERPL CALC-MCNC: 82.4 MG/DL
LYMPHOCYTES # BLD AUTO: 2.8 K/UL
LYMPHOCYTES NFR BLD: 42 %
MCH RBC QN AUTO: 30.4 PG
MCHC RBC AUTO-ENTMCNC: 32.1 G/DL
MCV RBC AUTO: 95 FL
MONOCYTES # BLD AUTO: 0.4 K/UL
MONOCYTES NFR BLD: 6.4 %
NEUTROPHILS # BLD AUTO: 3.1 K/UL
NEUTROPHILS NFR BLD: 47.7 %
NONHDLC SERPL-MCNC: 110 MG/DL
NRBC BLD-RTO: 0 /100 WBC
PLATELET # BLD AUTO: 268 K/UL
PMV BLD AUTO: 10.5 FL
POTASSIUM SERPL-SCNC: 4.5 MMOL/L
PROT SERPL-MCNC: 7.1 G/DL
RBC # BLD AUTO: 4.01 M/UL
SODIUM SERPL-SCNC: 141 MMOL/L
TRIGL SERPL-MCNC: 138 MG/DL
TSH SERPL DL<=0.005 MIU/L-ACNC: 1.59 UIU/ML
WBC # BLD AUTO: 6.54 K/UL

## 2019-03-11 PROCEDURE — 36415 COLL VENOUS BLD VENIPUNCTURE: CPT | Mod: PO

## 2019-03-11 PROCEDURE — 85025 COMPLETE CBC W/AUTO DIFF WBC: CPT

## 2019-03-11 PROCEDURE — 84443 ASSAY THYROID STIM HORMONE: CPT

## 2019-03-11 PROCEDURE — 80053 COMPREHEN METABOLIC PANEL: CPT

## 2019-03-11 PROCEDURE — 80061 LIPID PANEL: CPT

## 2019-03-11 PROCEDURE — 83036 HEMOGLOBIN GLYCOSYLATED A1C: CPT

## 2019-03-27 ENCOUNTER — OFFICE VISIT (OUTPATIENT)
Dept: INTERNAL MEDICINE | Facility: CLINIC | Age: 59
End: 2019-03-27
Payer: COMMERCIAL

## 2019-03-27 VITALS
TEMPERATURE: 99 F | HEART RATE: 72 BPM | DIASTOLIC BLOOD PRESSURE: 68 MMHG | SYSTOLIC BLOOD PRESSURE: 118 MMHG | WEIGHT: 189.38 LBS | HEIGHT: 64 IN | BODY MASS INDEX: 32.33 KG/M2

## 2019-03-27 DIAGNOSIS — M25.561 CHRONIC PAIN OF BOTH KNEES: ICD-10-CM

## 2019-03-27 DIAGNOSIS — M25.562 CHRONIC PAIN OF BOTH KNEES: ICD-10-CM

## 2019-03-27 DIAGNOSIS — Z00.00 ROUTINE GENERAL MEDICAL EXAMINATION AT A HEALTH CARE FACILITY: Primary | ICD-10-CM

## 2019-03-27 DIAGNOSIS — Z12.39 BREAST CANCER SCREENING: ICD-10-CM

## 2019-03-27 DIAGNOSIS — G89.29 CHRONIC PAIN OF BOTH KNEES: ICD-10-CM

## 2019-03-27 DIAGNOSIS — R73.09 ABNORMAL GLUCOSE: ICD-10-CM

## 2019-03-27 PROCEDURE — 99999 PR PBB SHADOW E&M-EST. PATIENT-LVL III: ICD-10-PCS | Mod: PBBFAC,,, | Performed by: FAMILY MEDICINE

## 2019-03-27 PROCEDURE — 99999 PR PBB SHADOW E&M-EST. PATIENT-LVL III: CPT | Mod: PBBFAC,,, | Performed by: FAMILY MEDICINE

## 2019-03-27 PROCEDURE — 99396 PR PREVENTIVE VISIT,EST,40-64: ICD-10-PCS | Mod: S$GLB,,, | Performed by: FAMILY MEDICINE

## 2019-03-27 PROCEDURE — 99396 PREV VISIT EST AGE 40-64: CPT | Mod: S$GLB,,, | Performed by: FAMILY MEDICINE

## 2019-03-27 RX ORDER — MELOXICAM 15 MG/1
15 TABLET ORAL DAILY PRN
Qty: 30 TABLET | Refills: 3 | Status: SHIPPED | OUTPATIENT
Start: 2019-03-27 | End: 2020-07-20 | Stop reason: SDUPTHER

## 2019-03-27 RX ORDER — ATORVASTATIN CALCIUM 20 MG/1
20 TABLET, FILM COATED ORAL DAILY
Qty: 90 TABLET | Refills: 3 | Status: SHIPPED | OUTPATIENT
Start: 2019-03-27 | End: 2020-01-22 | Stop reason: SDUPTHER

## 2019-03-27 NOTE — PROGRESS NOTES
"Subjective:      Patient ID: Shirley Amaya is a 59 y.o. female.    Chief Complaint:  Annual    HPI  60 yo female here for annual.  Weight up 3 lbs.  Metformin causing bad constipation, wants to try to stop it for awhile and see how she does.  She is eating well and trying to exercise.  Occ knee pain, uses mobic PRN    Past Medical History:   Diagnosis Date    Abnormal glucose     Hyperlipidemia     OA (osteoarthritis) of knee     YUDITH on CPAP      Family History   Problem Relation Age of Onset    Diabetes Father     Lung cancer Sister     Stomach cancer Brother     Heart disease Neg Hx     Hypertension Neg Hx     Colon cancer Neg Hx     Breast cancer Neg Hx      Past Surgical History:   Procedure Laterality Date    CERVICAL CONIZATION   W/ LASER      COLONOSCOPY N/A 2016    Performed by Mary Victor MD at [facility] ENDO     Social History     Tobacco Use    Smoking status: Former Smoker     Packs/day: 2.00     Years: 25.00     Pack years: 50.00     Last attempt to quit: 2000     Years since quittin.9    Smokeless tobacco: Never Used   Substance Use Topics    Alcohol use: No    Drug use: No       /68   Pulse 72   Temp 98.6 °F (37 °C) (Oral)   Ht 5' 4" (1.626 m)   Wt 85.9 kg (189 lb 6 oz)   BMI 32.51 kg/m²     Review of Systems   Constitutional: Negative for activity change, appetite change, chills, diaphoresis, fatigue, fever and unexpected weight change.   HENT: Negative for ear pain, hearing loss, postnasal drip, rhinorrhea, tinnitus and trouble swallowing.    Eyes: Negative for discharge and visual disturbance.   Respiratory: Negative for cough, chest tightness, shortness of breath and wheezing.    Cardiovascular: Negative for chest pain, palpitations and leg swelling.   Gastrointestinal: Positive for constipation. Negative for abdominal distention, blood in stool, diarrhea and vomiting.   Endocrine: Negative for polydipsia and polyuria.   Genitourinary: " Negative for difficulty urinating, dysuria, frequency, hematuria, menstrual problem and urgency.   Musculoskeletal: Positive for arthralgias. Negative for back pain, joint swelling and neck pain.   Neurological: Negative for weakness and headaches.   Hematological: Negative for adenopathy.   Psychiatric/Behavioral: Negative for confusion, decreased concentration and dysphoric mood.       Objective:     Physical Exam   Constitutional: She is oriented to person, place, and time. She appears well-developed and well-nourished. No distress.   HENT:   Right Ear: External ear normal.   Left Ear: External ear normal.   Nose: Nose normal.   Mouth/Throat: Oropharynx is clear and moist.   Eyes: Pupils are equal, round, and reactive to light. Conjunctivae are normal.   Neck: Normal range of motion. Neck supple. Carotid bruit is not present. No thyromegaly present.   Cardiovascular: Normal rate, regular rhythm and normal heart sounds. Exam reveals no gallop.   No murmur heard.  Pulmonary/Chest: Effort normal and breath sounds normal. No respiratory distress. She has no wheezes. She has no rales.   Abdominal: Soft. Bowel sounds are normal. She exhibits no distension. There is no tenderness. There is no guarding.   Musculoskeletal: She exhibits no edema.   Neurological: She is alert and oriented to person, place, and time. No cranial nerve deficit.   Skin: Skin is warm and dry. No rash noted.   Psychiatric: She has a normal mood and affect. Her behavior is normal. Judgment and thought content normal.   Nursing note and vitals reviewed.      Lab Results   Component Value Date    WBC 6.54 03/11/2019    HGB 12.2 03/11/2019    HCT 38.0 03/11/2019     03/11/2019    CHOL 164 03/11/2019    TRIG 138 03/11/2019    HDL 54 03/11/2019    ALT 15 03/11/2019    AST 19 03/11/2019     03/11/2019    K 4.5 03/11/2019     03/11/2019    CREATININE 0.6 03/11/2019    BUN 18 03/11/2019    CO2 28 03/11/2019    TSH 1.592 03/11/2019     HGBA1C 6.1 (H) 03/11/2019       Assessment:     1. Routine general medical examination at a health care facility    2. Abnormal glucose    3. Chronic pain of both knees         Plan:     Routine general medical examination at a health care facility    Abnormal glucose  -     Hemoglobin A1c; Future; Expected date: 07/27/2019    Chronic pain of both knees  -     meloxicam (MOBIC) 15 MG tablet; Take 1 tablet (15 mg total) by mouth daily as needed for Pain.  Dispense: 30 tablet; Refill: 3    Other orders  -     atorvastatin (LIPITOR) 20 MG tablet; Take 1 tablet (20 mg total) by mouth once daily.  Dispense: 90 tablet; Refill: 3    Can hold metformin for next 4 mos  Focus on diet/exericse and keep bowels moving  Lipids stable, cont statin  Mobic PRN  Up date Mammogram  F/u 4 mos for lab check

## 2019-04-01 ENCOUNTER — HOSPITAL ENCOUNTER (OUTPATIENT)
Dept: RADIOLOGY | Facility: HOSPITAL | Age: 59
Discharge: HOME | End: 2019-04-01
Attending: FAMILY MEDICINE
Payer: COMMERCIAL

## 2019-04-01 VITALS — HEIGHT: 64 IN | BODY MASS INDEX: 32.27 KG/M2 | WEIGHT: 189 LBS

## 2019-04-01 DIAGNOSIS — Z12.39 BREAST CANCER SCREENING: ICD-10-CM

## 2019-04-01 PROCEDURE — 77067 SCR MAMMO BI INCL CAD: CPT | Mod: 26,,, | Performed by: RADIOLOGY

## 2019-04-01 PROCEDURE — 77067 SCR MAMMO BI INCL CAD: CPT | Mod: TC

## 2019-04-01 PROCEDURE — 77067 MAMMO DIGITAL SCREENING BILAT WITH TOMOSYNTHESIS_CAD: ICD-10-PCS | Mod: 26,,, | Performed by: RADIOLOGY

## 2019-04-01 PROCEDURE — 77063 BREAST TOMOSYNTHESIS BI: CPT | Mod: 26,,, | Performed by: RADIOLOGY

## 2019-04-01 PROCEDURE — 77063 MAMMO DIGITAL SCREENING BILAT WITH TOMOSYNTHESIS_CAD: ICD-10-PCS | Mod: 26,,, | Performed by: RADIOLOGY

## 2019-06-29 ENCOUNTER — OFFICE VISIT (OUTPATIENT)
Dept: OBSTETRICS AND GYNECOLOGY | Facility: CLINIC | Age: 59
End: 2019-06-29
Payer: COMMERCIAL

## 2019-06-29 VITALS
DIASTOLIC BLOOD PRESSURE: 68 MMHG | HEIGHT: 64 IN | WEIGHT: 188.94 LBS | BODY MASS INDEX: 32.26 KG/M2 | SYSTOLIC BLOOD PRESSURE: 126 MMHG

## 2019-06-29 DIAGNOSIS — N89.8 VAGINAL IRRITATION: Primary | ICD-10-CM

## 2019-06-29 PROCEDURE — 3008F PR BODY MASS INDEX (BMI) DOCUMENTED: ICD-10-PCS | Mod: CPTII,S$GLB,, | Performed by: NURSE PRACTITIONER

## 2019-06-29 PROCEDURE — 99999 PR PBB SHADOW E&M-EST. PATIENT-LVL III: CPT | Mod: PBBFAC,,, | Performed by: NURSE PRACTITIONER

## 2019-06-29 PROCEDURE — 99213 OFFICE O/P EST LOW 20 MIN: CPT | Mod: S$GLB,,, | Performed by: NURSE PRACTITIONER

## 2019-06-29 PROCEDURE — 99213 PR OFFICE/OUTPT VISIT, EST, LEVL III, 20-29 MIN: ICD-10-PCS | Mod: S$GLB,,, | Performed by: NURSE PRACTITIONER

## 2019-06-29 PROCEDURE — 87529 HSV DNA AMP PROBE: CPT

## 2019-06-29 PROCEDURE — 3008F BODY MASS INDEX DOCD: CPT | Mod: CPTII,S$GLB,, | Performed by: NURSE PRACTITIONER

## 2019-06-29 PROCEDURE — 99999 PR PBB SHADOW E&M-EST. PATIENT-LVL III: ICD-10-PCS | Mod: PBBFAC,,, | Performed by: NURSE PRACTITIONER

## 2019-06-29 RX ORDER — TRIAMCINOLONE ACETONIDE 1 MG/G
CREAM TOPICAL 2 TIMES DAILY
Qty: 15 G | Refills: 0 | Status: SHIPPED | OUTPATIENT
Start: 2019-06-29 | End: 2020-07-20

## 2019-06-29 NOTE — PROGRESS NOTES
CC: Vaginal rash    Shirley Amaya is a 59 y.o. female  presents for c/o vaginal rash. Patient was seen and treated for same issue, several weeks ago. Patient reports a resolve to rash, using Mycolog cream, after stopping cream, rash returned.     Past Medical History:   Diagnosis Date    Abnormal glucose     Hyperlipidemia     OA (osteoarthritis) of knee     YUDITH on CPAP      Past Surgical History:   Procedure Laterality Date    CERVICAL CONIZATION   W/ LASER      COLONOSCOPY N/A 2016    Performed by [facility] Victor MD at [facility] ENDO     Social History     Socioeconomic History    Marital status:      Spouse name: Not on file    Number of children: 2    Years of education: Not on file    Highest education level: Not on file   Occupational History    Occupation: Newspaper   Social Needs    Financial resource strain: Not on file    Food insecurity:     Worry: Not on file     Inability: Not on file    Transportation needs:     Medical: Not on file     Non-medical: Not on file   Tobacco Use    Smoking status: Former Smoker     Packs/day: 2.00     Years: 25.00     Pack years: 50.00     Last attempt to quit: 2000     Years since quittin.1    Smokeless tobacco: Never Used   Substance and Sexual Activity    Alcohol use: No    Drug use: No    Sexual activity: Yes     Partners: Male     Birth control/protection: Post-menopausal   Lifestyle    Physical activity:     Days per week: Not on file     Minutes per session: Not on file    Stress: Not on file   Relationships    Social connections:     Talks on phone: Not on file     Gets together: Not on file     Attends [employer] service: Not on file     Active member of club or organization: Not on file     Attends meetings of clubs or organizations: Not on file     Relationship status: Not on file   Other Topics Concern    Not on file   Social History Narrative    Not on file     Family History   Problem Relation Age  "of Onset    Diabetes Father     Lung cancer Sister     Stomach cancer Brother     Heart disease Neg Hx     Hypertension Neg Hx     Colon cancer Neg Hx     Breast cancer Neg Hx      OB History        2    Para   2    Term   2            AB        Living   2       SAB        TAB        Ectopic        Multiple        Live Births   2                 /68   Ht 5' 4" (1.626 m)   Wt 85.7 kg (188 lb 15 oz)   BMI 32.43 kg/m²       ROS:  GENERAL: Denies weight gain or weight loss. Feeling well overall.   REPRODUCTIVE: See HPI.       PHYSICAL EXAM:  APPEARANCE: Well nourished, well developed, in no acute distress.  AFFECT: WNL, alert and oriented x 3  PELVIC: External genitalia, white patches, with blisters like lesions.      1. Vaginal irritation  HSV by Rapid PCR, Non-Blood [facility]; Vagina    HSV by Rapid PCR, Non-Blood [facility]; Vagina     PLAN:  Rash has exacerbated since last visit, will refer for additional evaluation with MD  HSV cx            "

## 2019-07-02 LAB
HSV1 DNA SPEC QL NAA+PROBE: NEGATIVE
HSV2 DNA SPEC QL NAA+PROBE: NEGATIVE
SPECIMEN SOURCE: NORMAL

## 2019-07-05 ENCOUNTER — OFFICE VISIT (OUTPATIENT)
Dept: OBSTETRICS AND GYNECOLOGY | Facility: CLINIC | Age: 59
End: 2019-07-05
Payer: COMMERCIAL

## 2019-07-05 VITALS
HEIGHT: 64 IN | BODY MASS INDEX: 31.84 KG/M2 | SYSTOLIC BLOOD PRESSURE: 126 MMHG | DIASTOLIC BLOOD PRESSURE: 78 MMHG | WEIGHT: 186.5 LBS

## 2019-07-05 DIAGNOSIS — L90.0 LICHEN SCLEROSUS: Primary | ICD-10-CM

## 2019-07-05 PROCEDURE — 3008F BODY MASS INDEX DOCD: CPT | Mod: CPTII,S$GLB,, | Performed by: OBSTETRICS & GYNECOLOGY

## 2019-07-05 PROCEDURE — 99213 OFFICE O/P EST LOW 20 MIN: CPT | Mod: 25,S$GLB,, | Performed by: OBSTETRICS & GYNECOLOGY

## 2019-07-05 PROCEDURE — 88305 TISSUE EXAM BY PATHOLOGIST: CPT | Performed by: PATHOLOGY

## 2019-07-05 PROCEDURE — 56605 PR BIOPSY VULVA/PERINEUM,ONE LESN: ICD-10-PCS | Mod: S$GLB,,, | Performed by: OBSTETRICS & GYNECOLOGY

## 2019-07-05 PROCEDURE — 3008F PR BODY MASS INDEX (BMI) DOCUMENTED: ICD-10-PCS | Mod: CPTII,S$GLB,, | Performed by: OBSTETRICS & GYNECOLOGY

## 2019-07-05 PROCEDURE — 99999 PR PBB SHADOW E&M-EST. PATIENT-LVL III: ICD-10-PCS | Mod: PBBFAC,,, | Performed by: OBSTETRICS & GYNECOLOGY

## 2019-07-05 PROCEDURE — 99999 PR PBB SHADOW E&M-EST. PATIENT-LVL III: CPT | Mod: PBBFAC,,, | Performed by: OBSTETRICS & GYNECOLOGY

## 2019-07-05 PROCEDURE — 99213 PR OFFICE/OUTPT VISIT, EST, LEVL III, 20-29 MIN: ICD-10-PCS | Mod: 25,S$GLB,, | Performed by: OBSTETRICS & GYNECOLOGY

## 2019-07-05 PROCEDURE — 88305 TISSUE SPECIMEN TO PATHOLOGY, OBSTETRICS/GYNECOLOGY: ICD-10-PCS | Mod: 26,,, | Performed by: PATHOLOGY

## 2019-07-05 PROCEDURE — 56605 BIOPSY OF VULVA/PERINEUM: CPT | Mod: S$GLB,,, | Performed by: OBSTETRICS & GYNECOLOGY

## 2019-07-05 PROCEDURE — 88305 TISSUE EXAM BY PATHOLOGIST: CPT | Mod: 26,,, | Performed by: PATHOLOGY

## 2019-07-05 RX ORDER — CLOBETASOL PROPIONATE 0.5 MG/G
OINTMENT TOPICAL 2 TIMES DAILY
Qty: 30 G | Refills: 1 | Status: SHIPPED | OUTPATIENT
Start: 2019-07-05 | End: 2022-01-19 | Stop reason: SDUPTHER

## 2019-07-05 NOTE — PATIENT INSTRUCTIONS
Understanding Lichen Sclerosus  Lichen sclerosus is a long-term (chronic) skin condition. It causes white patches to form on the body. These can appear anywhere, even in the mouth. But they most often affect skin around the genitals. The condition is more common in older women and young girls. It also tends to occur in men who are not circumcised.  How to say it  DOMINGO-nacho [id number]   What causes lichen sclerosus?  Experts dont yet know exactly what causes lichen sclerosus. It may be an autoimmune disease. Thats when the immune system attacks healthy parts of the body, such as the skin. It may also be linked to genetics, hormones, or some infections.  Symptoms of lichen sclerosus  Lichen sclerosus causes white patches on the skin. These patches break down the skin. The skin may become thin, wrinkled, and cracked. It can be itchy and painful. The patches may scar, discolor, and disfigure the skin. These changes can damage the skin. They are often found on the back, shoulders, neck, wrist, thigh, and breast areas. They may also be found on the lips or in the mouth. They can also appear around the vagina and on the penis. That may lead to problems having sex and using the bathroom.  Treatment for lichen sclerosus  Treatment can ease symptoms and prevent scarring. It should be started early to avoid lasting (permanent) damage to the skin. Treatment options include:  · Skin care. Bathing with mild soaps and using moisturizing cream may ease itching.  · Steroids. These medicines are often put on the skin as an ointment or cream. Very strong steroid creams are used. Your provider may also inject these into the white patches in severe cases.  · Other medicines. An oral medicine (antihistamine) may be given to ease itching. Other creams or ointments are also available if a steroid doesnt work.  · Phototherapy. This treatment directs ultraviolet light on the skin to help clear it.  · Surgery. This treatment helps with  scarring and skin disfigurement. Men may benefit from circumcision if they havent yet had it done.  Possible complications of lichen sclerosus  ·  Skin cancer of the genitals  When to call your healthcare provider  Call your healthcare provider right away if you have any of these:  · Fever of 100.4°F (38°C) or higher, or as directed  · Redness, swelling, or fluid leaking from your incision that gets worse  · Pain that gets worse  · Symptoms that dont get better, or get worse  · New symptoms   Date Last Reviewed: 5/1/2016  © 1559-3457 [employer]. [address] [place] [id number]. All rights reserved. This information is not intended as a substitute for professional medical care. Always follow your healthcare professional's instructions.

## 2019-07-16 ENCOUNTER — LAB VISIT (OUTPATIENT)
Dept: LAB | Facility: HOSPITAL | Age: 59
End: 2019-07-16
Attending: FAMILY MEDICINE
Payer: COMMERCIAL

## 2019-07-16 DIAGNOSIS — R73.09 ABNORMAL GLUCOSE: ICD-10-CM

## 2019-07-16 LAB
ESTIMATED AVG GLUCOSE: 131 MG/DL (ref 68–131)
HBA1C MFR BLD HPLC: 6.2 % (ref 4–5.6)

## 2019-07-16 PROCEDURE — 83036 HEMOGLOBIN GLYCOSYLATED A1C: CPT

## 2019-07-16 PROCEDURE — 36415 COLL VENOUS BLD VENIPUNCTURE: CPT | Mod: PO

## 2019-07-24 ENCOUNTER — OFFICE VISIT (OUTPATIENT)
Dept: INTERNAL MEDICINE | Facility: CLINIC | Age: 59
End: 2019-07-24
Payer: COMMERCIAL

## 2019-07-24 VITALS
HEART RATE: 66 BPM | TEMPERATURE: 98 F | SYSTOLIC BLOOD PRESSURE: 110 MMHG | HEIGHT: 64 IN | DIASTOLIC BLOOD PRESSURE: 64 MMHG | WEIGHT: 188.25 LBS | BODY MASS INDEX: 32.14 KG/M2

## 2019-07-24 DIAGNOSIS — E66.9 OBESITY, UNSPECIFIED CLASSIFICATION, UNSPECIFIED OBESITY TYPE, UNSPECIFIED WHETHER SERIOUS COMORBIDITY PRESENT: ICD-10-CM

## 2019-07-24 DIAGNOSIS — G47.33 OSA ON CPAP: ICD-10-CM

## 2019-07-24 DIAGNOSIS — R73.09 ABNORMAL GLUCOSE: Primary | ICD-10-CM

## 2019-07-24 DIAGNOSIS — Z29.9 PREVENTIVE MEASURE: ICD-10-CM

## 2019-07-24 DIAGNOSIS — E78.5 HYPERLIPIDEMIA, UNSPECIFIED HYPERLIPIDEMIA TYPE: ICD-10-CM

## 2019-07-24 PROCEDURE — 99214 OFFICE O/P EST MOD 30 MIN: CPT | Mod: S$GLB,,, | Performed by: FAMILY MEDICINE

## 2019-07-24 PROCEDURE — 3008F PR BODY MASS INDEX (BMI) DOCUMENTED: ICD-10-PCS | Mod: CPTII,S$GLB,, | Performed by: FAMILY MEDICINE

## 2019-07-24 PROCEDURE — 3008F BODY MASS INDEX DOCD: CPT | Mod: CPTII,S$GLB,, | Performed by: FAMILY MEDICINE

## 2019-07-24 PROCEDURE — 99999 PR PBB SHADOW E&M-EST. PATIENT-LVL III: ICD-10-PCS | Mod: PBBFAC,,, | Performed by: FAMILY MEDICINE

## 2019-07-24 PROCEDURE — 99999 PR PBB SHADOW E&M-EST. PATIENT-LVL III: CPT | Mod: PBBFAC,,, | Performed by: FAMILY MEDICINE

## 2019-07-24 PROCEDURE — 99214 PR OFFICE/OUTPT VISIT, EST, LEVL IV, 30-39 MIN: ICD-10-PCS | Mod: S$GLB,,, | Performed by: FAMILY MEDICINE

## 2019-07-24 RX ORDER — METFORMIN HYDROCHLORIDE 500 MG/1
500 TABLET ORAL 2 TIMES DAILY WITH MEALS
Qty: 60 TABLET | Refills: 6 | Status: SHIPPED | OUTPATIENT
Start: 2019-07-24 | End: 2020-01-22 | Stop reason: SDUPTHER

## 2019-07-24 NOTE — PROGRESS NOTES
"Subjective:      Patient ID: Shirley Amaya is a 59 y.o. female.    Chief Complaint:  F/u Glucose/weight    HPI  60 yo here for f/u.  Weight same as last time.  Willing to restart metformin, did well with dropping weight while on the metformin.  Trying to eat well and stay active, has some increased stressors in her life right now.  Suffers with constipation.  Using miralax daily.  Saw Gyn//treated for Lichen Sclerosus.    Past Medical History:   Diagnosis Date    Abnormal glucose     Hyperlipidemia     OA (osteoarthritis) of knee     YUDITH on CPAP      Family History   Problem Relation Age of Onset    Diabetes Father     Lung cancer Sister     Stomach cancer Brother     Heart disease Neg Hx     Hypertension Neg Hx     Colon cancer Neg Hx     Breast cancer Neg Hx      Past Surgical History:   Procedure Laterality Date    CERVICAL CONIZATION   W/ LASER      COLONOSCOPY N/A 2016    Performed by Mary Victor MD at [facility] ENDO     Social History     Tobacco Use    Smoking status: Former Smoker     Packs/day: 2.00     Years: 25.00     Pack years: 50.00     Last attempt to quit: 2000     Years since quittin.2    Smokeless tobacco: Never Used   Substance Use Topics    Alcohol use: No     Frequency: Never     Drinks per session: Patient refused     Binge frequency: Patient refused    Drug use: No       /64   Pulse 66   Temp 98 °F (36.7 °C) (Oral)   Ht 5' 4" (1.626 m)   Wt 85.4 kg (188 lb 4.4 oz)   BMI 32.32 kg/m²     Review of Systems   Constitutional: Negative for activity change and unexpected weight change.   HENT: Negative for hearing loss, rhinorrhea and trouble swallowing.    Eyes: Negative for discharge and visual disturbance.   Respiratory: Negative for chest tightness and wheezing.    Cardiovascular: Negative for chest pain and palpitations.   Gastrointestinal: Positive for constipation. Negative for blood in stool, diarrhea and vomiting.   Endocrine: Negative " for polydipsia and polyuria.   Genitourinary: Negative for difficulty urinating, dysuria, hematuria and menstrual problem.   Musculoskeletal: Negative for arthralgias, joint swelling and neck pain.   Neurological: Negative for weakness and headaches.   Psychiatric/Behavioral: Negative for confusion and dysphoric mood.       Objective:     Physical Exam   Constitutional: She is oriented to person, place, and time. She appears well-developed and well-nourished. No distress.   HENT:   Nose: Nose normal.   Mouth/Throat: Oropharynx is clear and moist.   Eyes: Pupils are equal, round, and reactive to light. Conjunctivae are normal.   Neck: Normal range of motion. Neck supple. Carotid bruit is not present.   Cardiovascular: Normal rate, regular rhythm and normal heart sounds.   Pulmonary/Chest: Effort normal and breath sounds normal. No respiratory distress. She has no wheezes. She has no rales.   Abdominal: Soft. Bowel sounds are normal. She exhibits no distension. There is no tenderness. There is no guarding.   Musculoskeletal: She exhibits no edema.   Neurological: She is alert and oriented to person, place, and time. No cranial nerve deficit.   Skin: Skin is warm and dry. No rash noted.   Psychiatric: She has a normal mood and affect. Her behavior is normal. Judgment and thought content normal.   Nursing note and vitals reviewed.      Lab Results   Component Value Date    WBC 6.54 03/11/2019    HGB 12.2 03/11/2019    HCT 38.0 03/11/2019     03/11/2019    CHOL 164 03/11/2019    TRIG 138 03/11/2019    HDL 54 03/11/2019    ALT 15 03/11/2019    AST 19 03/11/2019     03/11/2019    K 4.5 03/11/2019     03/11/2019    CREATININE 0.6 03/11/2019    BUN 18 03/11/2019    CO2 28 03/11/2019    TSH 1.592 03/11/2019    HGBA1C 6.2 (H) 07/16/2019       Assessment:     1. Abnormal glucose    2. Hyperlipidemia, unspecified hyperlipidemia type    3. Obesity, unspecified classification, unspecified obesity type,  unspecified whether serious comorbidity present    4. YUDITH on CPAP    5. Preventive measure         Plan:     Abnormal glucose    Hyperlipidemia, unspecified hyperlipidemia type    Obesity, unspecified classification, unspecified obesity type, unspecified whether serious comorbidity present    YUDITH on CPAP    Preventive measure  -     CBC auto differential; Future; Expected date: 01/24/2020  -     Comprehensive metabolic panel; Future; Expected date: 01/24/2020  -     Hemoglobin A1c; Future; Expected date: 01/24/2020  -     Lipid panel; Future; Expected date: 01/24/2020  -     TSH; Future; Expected date: 01/24/2020  -     Vitamin D; Future; Expected date: 01/24/2020    Other orders  -     metFORMIN (GLUCOPHAGE) 500 MG tablet; Take 1 tablet (500 mg total) by mouth 2 (two) times daily with meals.  Dispense: 60 tablet; Refill: 6    Reviewed lab  Start back on metformin 500mg bid  Healthy diet/exercise  Cont miralax, try some Mg supplements  Drink 64 oz water minimum  F/u 6 mos with labs

## 2020-01-14 ENCOUNTER — LAB VISIT (OUTPATIENT)
Dept: LAB | Facility: HOSPITAL | Age: 60
End: 2020-01-14
Attending: FAMILY MEDICINE
Payer: COMMERCIAL

## 2020-01-14 DIAGNOSIS — Z29.9 PREVENTIVE MEASURE: ICD-10-CM

## 2020-01-14 LAB
ALBUMIN SERPL BCP-MCNC: 4.1 G/DL (ref 3.5–5.2)
ALP SERPL-CCNC: 58 U/L (ref 55–135)
ALT SERPL W/O P-5'-P-CCNC: 14 U/L (ref 10–44)
ANION GAP SERPL CALC-SCNC: 8 MMOL/L (ref 8–16)
AST SERPL-CCNC: 19 U/L (ref 10–40)
BASOPHILS # BLD AUTO: 0.03 K/UL (ref 0–0.2)
BASOPHILS NFR BLD: 0.5 % (ref 0–1.9)
BILIRUB SERPL-MCNC: 0.6 MG/DL (ref 0.1–1)
BUN SERPL-MCNC: 17 MG/DL (ref 6–20)
CALCIUM SERPL-MCNC: 9.3 MG/DL (ref 8.7–10.5)
CHLORIDE SERPL-SCNC: 106 MMOL/L (ref 95–110)
CHOLEST SERPL-MCNC: 163 MG/DL (ref 120–199)
CHOLEST/HDLC SERPL: 2.9 {RATIO} (ref 2–5)
CO2 SERPL-SCNC: 28 MMOL/L (ref 23–29)
CREAT SERPL-MCNC: 0.6 MG/DL (ref 0.5–1.4)
DIFFERENTIAL METHOD: ABNORMAL
EOSINOPHIL # BLD AUTO: 0.2 K/UL (ref 0–0.5)
EOSINOPHIL NFR BLD: 2.9 % (ref 0–8)
ERYTHROCYTE [DISTWIDTH] IN BLOOD BY AUTOMATED COUNT: 13.8 % (ref 11.5–14.5)
EST. GFR  (AFRICAN AMERICAN): >60 ML/MIN/1.73 M^2
EST. GFR  (NON AFRICAN AMERICAN): >60 ML/MIN/1.73 M^2
GLUCOSE SERPL-MCNC: 91 MG/DL (ref 70–110)
HCT VFR BLD AUTO: 38.8 % (ref 37–48.5)
HDLC SERPL-MCNC: 56 MG/DL (ref 40–75)
HDLC SERPL: 34.4 % (ref 20–50)
HGB BLD-MCNC: 12 G/DL (ref 12–16)
IMM GRANULOCYTES # BLD AUTO: 0.01 K/UL (ref 0–0.04)
IMM GRANULOCYTES NFR BLD AUTO: 0.2 % (ref 0–0.5)
LDLC SERPL CALC-MCNC: 85.8 MG/DL (ref 63–159)
LYMPHOCYTES # BLD AUTO: 2.1 K/UL (ref 1–4.8)
LYMPHOCYTES NFR BLD: 38.6 % (ref 18–48)
MCH RBC QN AUTO: 30.3 PG (ref 27–31)
MCHC RBC AUTO-ENTMCNC: 30.9 G/DL (ref 32–36)
MCV RBC AUTO: 98 FL (ref 82–98)
MONOCYTES # BLD AUTO: 0.4 K/UL (ref 0.3–1)
MONOCYTES NFR BLD: 6.5 % (ref 4–15)
NEUTROPHILS # BLD AUTO: 2.9 K/UL (ref 1.8–7.7)
NEUTROPHILS NFR BLD: 51.3 % (ref 38–73)
NONHDLC SERPL-MCNC: 107 MG/DL
NRBC BLD-RTO: 0 /100 WBC
PLATELET # BLD AUTO: 244 K/UL (ref 150–350)
PMV BLD AUTO: 10.4 FL (ref 9.2–12.9)
POTASSIUM SERPL-SCNC: 3.9 MMOL/L (ref 3.5–5.1)
PROT SERPL-MCNC: 7.1 G/DL (ref 6–8.4)
RBC # BLD AUTO: 3.96 M/UL (ref 4–5.4)
SODIUM SERPL-SCNC: 142 MMOL/L (ref 136–145)
TRIGL SERPL-MCNC: 106 MG/DL (ref 30–150)
TSH SERPL DL<=0.005 MIU/L-ACNC: 1.68 UIU/ML (ref 0.4–4)
WBC # BLD AUTO: 5.55 K/UL (ref 3.9–12.7)

## 2020-01-14 PROCEDURE — 85025 COMPLETE CBC W/AUTO DIFF WBC: CPT

## 2020-01-14 PROCEDURE — 80061 LIPID PANEL: CPT

## 2020-01-14 PROCEDURE — 84443 ASSAY THYROID STIM HORMONE: CPT

## 2020-01-14 PROCEDURE — 36415 COLL VENOUS BLD VENIPUNCTURE: CPT | Mod: PO

## 2020-01-14 PROCEDURE — 80053 COMPREHEN METABOLIC PANEL: CPT

## 2020-01-14 PROCEDURE — 82306 VITAMIN D 25 HYDROXY: CPT

## 2020-01-14 PROCEDURE — 83036 HEMOGLOBIN GLYCOSYLATED A1C: CPT

## 2020-01-15 LAB
25(OH)D3+25(OH)D2 SERPL-MCNC: 27 NG/ML (ref 30–96)
ESTIMATED AVG GLUCOSE: 131 MG/DL (ref 68–131)
HBA1C MFR BLD HPLC: 6.2 % (ref 4–5.6)

## 2020-01-22 ENCOUNTER — OFFICE VISIT (OUTPATIENT)
Dept: INTERNAL MEDICINE | Facility: CLINIC | Age: 60
End: 2020-01-22
Payer: COMMERCIAL

## 2020-01-22 VITALS
DIASTOLIC BLOOD PRESSURE: 70 MMHG | SYSTOLIC BLOOD PRESSURE: 120 MMHG | HEART RATE: 72 BPM | HEIGHT: 64 IN | WEIGHT: 186.31 LBS | TEMPERATURE: 98 F | BODY MASS INDEX: 31.81 KG/M2

## 2020-01-22 DIAGNOSIS — E78.5 HYPERLIPIDEMIA, UNSPECIFIED HYPERLIPIDEMIA TYPE: ICD-10-CM

## 2020-01-22 DIAGNOSIS — E66.9 OBESITY, UNSPECIFIED CLASSIFICATION, UNSPECIFIED OBESITY TYPE, UNSPECIFIED WHETHER SERIOUS COMORBIDITY PRESENT: ICD-10-CM

## 2020-01-22 DIAGNOSIS — Z00.00 ROUTINE GENERAL MEDICAL EXAMINATION AT A HEALTH CARE FACILITY: Primary | ICD-10-CM

## 2020-01-22 DIAGNOSIS — R73.09 ABNORMAL GLUCOSE: ICD-10-CM

## 2020-01-22 PROCEDURE — 99396 PREV VISIT EST AGE 40-64: CPT | Mod: S$GLB,,, | Performed by: FAMILY MEDICINE

## 2020-01-22 PROCEDURE — 99999 PR PBB SHADOW E&M-EST. PATIENT-LVL III: CPT | Mod: PBBFAC,,, | Performed by: FAMILY MEDICINE

## 2020-01-22 PROCEDURE — 99396 PR PREVENTIVE VISIT,EST,40-64: ICD-10-PCS | Mod: S$GLB,,, | Performed by: FAMILY MEDICINE

## 2020-01-22 PROCEDURE — 99999 PR PBB SHADOW E&M-EST. PATIENT-LVL III: ICD-10-PCS | Mod: PBBFAC,,, | Performed by: FAMILY MEDICINE

## 2020-01-22 RX ORDER — METFORMIN HYDROCHLORIDE 500 MG/1
500 TABLET ORAL 2 TIMES DAILY WITH MEALS
Qty: 180 TABLET | Refills: 3 | Status: SHIPPED | OUTPATIENT
Start: 2020-01-22 | End: 2021-01-25 | Stop reason: SDUPTHER

## 2020-01-22 RX ORDER — ATORVASTATIN CALCIUM 20 MG/1
20 TABLET, FILM COATED ORAL DAILY
Qty: 90 TABLET | Refills: 3 | Status: SHIPPED | OUTPATIENT
Start: 2020-01-22 | End: 2021-01-25 | Stop reason: SDUPTHER

## 2020-01-22 NOTE — PROGRESS NOTES
"Subjective:      Patient ID: Sihrley Amaya is a 59 y.o. female.    Chief Complaint:  Annual    HPI  58 yo with YUDITH on CPAP, obesity, abnormal glucose, hyperlipidemia, OA in knees here for annual.  Down 2 lbs.  Been under more stress.  Goal is to focus on weight loss.  Occ knee pain  Bowels more constipated//using miralax    Past Medical History:   Diagnosis Date    Abnormal glucose     Hyperlipidemia     OA (osteoarthritis) of knee     YUDITH on CPAP      Family History   Problem Relation Age of Onset    Diabetes Father     Lung cancer Sister     Stomach cancer Brother     Heart disease Neg Hx     Hypertension Neg Hx     Colon cancer Neg Hx     Breast cancer Neg Hx      Past Surgical History:   Procedure Laterality Date    CERVICAL CONIZATION   W/ LASER      COLONOSCOPY N/A 2016    Procedure: COLONOSCOPY;  Surgeon: Mary Victor MD;  Location: [facility];  Service: Endoscopy;  Laterality: N/A;     Social History     Tobacco Use    Smoking status: Former Smoker     Packs/day: 2.00     Years: 25.00     Pack years: 50.00     Last attempt to quit: 2000     Years since quittin.7    Smokeless tobacco: Never Used   Substance Use Topics    Alcohol use: No     Frequency: Never     Drinks per session: Patient refused     Binge frequency: Patient refused    Drug use: No       /70   Pulse 72   Temp 98.1 °F (36.7 °C) (Oral)   Ht 5' 4" (1.626 m)   Wt 84.5 kg (186 lb 4.6 oz)   BMI 31.98 kg/m²     Review of Systems   Constitutional: Negative for activity change, appetite change, chills, diaphoresis, fatigue, fever and unexpected weight change.   HENT: Negative for ear pain, hearing loss, postnasal drip, rhinorrhea, tinnitus and trouble swallowing.    Eyes: Negative for discharge and visual disturbance.   Respiratory: Negative for cough, chest tightness, shortness of breath and wheezing.    Cardiovascular: Negative for chest pain, palpitations and leg swelling. "   Gastrointestinal: Positive for constipation. Negative for abdominal distention, blood in stool, diarrhea and vomiting.   Endocrine: Negative for polydipsia and polyuria.   Genitourinary: Negative for difficulty urinating, dysuria, frequency, hematuria, menstrual problem and urgency.   Musculoskeletal: Positive for arthralgias. Negative for back pain, joint swelling and neck pain.   Neurological: Negative for weakness and headaches.   Hematological: Negative for adenopathy.   Psychiatric/Behavioral: Negative for confusion, decreased concentration and dysphoric mood.       Objective:     Physical Exam   Constitutional: She is oriented to person, place, and time. She appears well-developed and well-nourished. No distress.   HENT:   Right Ear: External ear normal.   Left Ear: External ear normal.   Nose: Nose normal.   Mouth/Throat: Oropharynx is clear and moist.   Eyes: Pupils are equal, round, and reactive to light. Conjunctivae are normal.   Neck: Normal range of motion. Neck supple. Carotid bruit is not present.   Cardiovascular: Normal rate, regular rhythm and normal heart sounds.   Pulmonary/Chest: Effort normal and breath sounds normal. No respiratory distress. She has no wheezes. She has no rales.   Abdominal: Soft. Bowel sounds are normal. She exhibits no distension. There is no tenderness. There is no guarding.   Musculoskeletal: She exhibits no edema.   Neurological: She is alert and oriented to person, place, and time. No cranial nerve deficit.   Skin: Skin is warm and dry. No rash noted.   Psychiatric: She has a normal mood and affect. Her behavior is normal. Judgment and thought content normal.   Nursing note and vitals reviewed.      Lab Results   Component Value Date    WBC 5.55 01/14/2020    HGB 12.0 01/14/2020    HCT 38.8 01/14/2020     01/14/2020    CHOL 163 01/14/2020    TRIG 106 01/14/2020    HDL 56 01/14/2020    ALT 14 01/14/2020    AST 19 01/14/2020     01/14/2020    K 3.9  01/14/2020     01/14/2020    CREATININE 0.6 01/14/2020    BUN 17 01/14/2020    CO2 28 01/14/2020    TSH 1.680 01/14/2020    HGBA1C 6.2 (H) 01/14/2020       Assessment:     1. Routine general medical examination at a health care facility    2. Abnormal glucose    3. Hyperlipidemia, unspecified hyperlipidemia type    4. Obesity, unspecified classification, unspecified obesity type, unspecified whether serious comorbidity present         Plan:     Routine general medical examination at a health care facility    Abnormal glucose  -     Hemoglobin A1c; Future; Expected date: 07/22/2020    Hyperlipidemia, unspecified hyperlipidemia type    Obesity, unspecified classification, unspecified obesity type, unspecified whether serious comorbidity present    Other orders  -     semaglutide (OZEMPIC) 0.25 mg or 0.5 mg(2 mg/1.5 mL) PnIj; Inject 0.25 mg into the skin every 7 days.  Dispense: 1.5 mL; Refill: 6  -     metFORMIN (GLUCOPHAGE) 500 MG tablet; Take 1 tablet (500 mg total) by mouth 2 (two) times daily with meals.  Dispense: 180 tablet; Refill: 3  -     atorvastatin (LIPITOR) 20 MG tablet; Take 1 tablet (20 mg total) by mouth once daily.  Dispense: 90 tablet; Refill: 3    Reviewed labs with pt  Add Ozempic 0.25mg weekly  Cont metformin/statin  Focus on good eating/exercise/weight loss  Check up at 6 mos

## 2020-02-28 ENCOUNTER — PATIENT MESSAGE (OUTPATIENT)
Dept: INTERNAL MEDICINE | Facility: CLINIC | Age: 60
End: 2020-02-28

## 2020-02-29 ENCOUNTER — OFFICE VISIT (OUTPATIENT)
Dept: URGENT CARE | Facility: CLINIC | Age: 60
End: 2020-02-29
Payer: COMMERCIAL

## 2020-02-29 VITALS
BODY MASS INDEX: 30.56 KG/M2 | DIASTOLIC BLOOD PRESSURE: 80 MMHG | HEART RATE: 91 BPM | TEMPERATURE: 99 F | WEIGHT: 178 LBS | OXYGEN SATURATION: 94 % | SYSTOLIC BLOOD PRESSURE: 120 MMHG

## 2020-02-29 DIAGNOSIS — J06.9 VIRAL URI WITH COUGH: Primary | ICD-10-CM

## 2020-02-29 DIAGNOSIS — R53.83 FATIGUE, UNSPECIFIED TYPE: ICD-10-CM

## 2020-02-29 LAB
CTP QC/QA: YES
FLUAV AG NPH QL: NEGATIVE
FLUBV AG NPH QL: NEGATIVE

## 2020-02-29 PROCEDURE — 87804 POCT INFLUENZA A/B: ICD-10-PCS | Mod: 59,QW,S$GLB, | Performed by: NURSE PRACTITIONER

## 2020-02-29 PROCEDURE — 87804 INFLUENZA ASSAY W/OPTIC: CPT | Mod: QW,S$GLB,, | Performed by: NURSE PRACTITIONER

## 2020-02-29 PROCEDURE — 99214 PR OFFICE/OUTPT VISIT, EST, LEVL IV, 30-39 MIN: ICD-10-PCS | Mod: 25,S$GLB,, | Performed by: NURSE PRACTITIONER

## 2020-02-29 PROCEDURE — 99214 OFFICE O/P EST MOD 30 MIN: CPT | Mod: 25,S$GLB,, | Performed by: NURSE PRACTITIONER

## 2020-02-29 NOTE — PROGRESS NOTES
Subjective:       Patient ID: Shirley Amaya is a 60 y.o. female.    Vitals:  weight is 80.7 kg (178 lb 0.3 oz). Her oral temperature is 99.2 °F (37.3 °C). Her blood pressure is 120/80 and her pulse is 91. Her oxygen saturation is 94% (abnormal).     Chief Complaint: Sinus Problem    Sinus Problem   This is a new problem. The current episode started yesterday. There has been no fever (states temp was 99.8). Her pain is at a severity of 0/10. She is experiencing no pain. Associated symptoms include chills and coughing. Pertinent negatives include no congestion, diaphoresis, ear pain, shortness of breath, sinus pressure or sore throat. Past treatments include oral decongestants. The treatment provided no relief.       Constitution: Positive for chills and fever. Negative for sweating and fatigue.   HENT: Negative for ear pain, congestion, sinus pain, sinus pressure, sore throat and voice change.    Neck: Negative for painful lymph nodes.   Eyes: Negative for eye redness.   Respiratory: Positive for cough. Negative for chest tightness, sputum production, bloody sputum, COPD, shortness of breath, stridor, wheezing and asthma.    Gastrointestinal: Negative for nausea and vomiting.   Musculoskeletal: Positive for muscle ache.   Skin: Negative for rash.   Allergic/Immunologic: Negative for seasonal allergies and asthma.   Hematologic/Lymphatic: Negative for swollen lymph nodes.       Objective:      Physical Exam   Constitutional: She is oriented to person, place, and time. She appears well-developed and well-nourished. She is cooperative.  Non-toxic appearance. She does not have a sickly appearance. She does not appear ill. No distress.   HENT:   Head: Normocephalic and atraumatic.   Right Ear: Hearing, tympanic membrane, external ear and ear canal normal. Tympanic membrane is not erythematous. No middle ear effusion.   Left Ear: Hearing, external ear and ear canal normal. Tympanic membrane is not erythematous. A  middle ear effusion is present.   Nose: Nose normal. No mucosal edema, rhinorrhea or nasal deformity. No epistaxis. Right sinus exhibits no maxillary sinus tenderness and no frontal sinus tenderness. Left sinus exhibits no maxillary sinus tenderness and no frontal sinus tenderness.   Mouth/Throat: Uvula is midline, oropharynx is clear and moist and mucous membranes are normal. No trismus in the jaw. Normal dentition. No uvula swelling. Cobblestoning present. No oropharyngeal exudate, posterior oropharyngeal edema or posterior oropharyngeal erythema.   Eyes: Conjunctivae and lids are normal. No scleral icterus.   Neck: Trachea normal, full passive range of motion without pain and phonation normal. Neck supple. No neck rigidity. No edema and no erythema present.   Cardiovascular: Normal rate, regular rhythm, normal heart sounds, intact distal pulses and normal pulses.   Pulmonary/Chest: Effort normal and breath sounds normal. No respiratory distress. She has no decreased breath sounds. She has no wheezes. She has no rhonchi. She has no rales.   Abdominal: Normal appearance.   Musculoskeletal: Normal range of motion. She exhibits no edema or deformity.   Neurological: She is alert and oriented to person, place, and time. She exhibits normal muscle tone. Coordination normal.   Skin: Skin is warm, dry, intact, not diaphoretic and not pale.   Psychiatric: She has a normal mood and affect. Her speech is normal and behavior is normal. Judgment and thought content normal. Cognition and memory are normal.   Nursing note and vitals reviewed.        Assessment:       1. Viral URI with cough    2. Fatigue, unspecified type        Plan:         Viral URI with cough    Fatigue, unspecified type  -     POCT Influenza A/B         Results for orders placed or performed in visit on 02/29/20   POCT Influenza A/B   Result Value Ref Range    Rapid Influenza A Ag Negative Negative    Rapid Influenza B Ag Negative Negative    Quality  Control Acceptable Yes      Lab results reviewed and discussed with patient.    · Your symptoms are likely due to a viral infection. These infections can last up to 14 days, but you should notice some improvement of your symptoms within the first 7-10 days. Viral infections will not improve with antibiotics. If your symptoms persist >10 days without improvement or if you have any new or worsening symptoms, this is an indication that you may have developed a bacterial infection and should return to your primary care provider or to Urgent Care.   · Getting plenty of rest is very important to fighting infections.  · Increase fluids.   · May apply warm compresses as needed for facial pain and congestion.   · Saline nasal spray to loosen nasal congestion.  · Flonase or Nasacort to reduce inflammation in the sinus cavities.  · You may take an over the counter antihistamine for allergy symptoms such as sneezing, itchy/watery eyes, scratchy throat, or congestion.  · Take Tylenol or Ibuprofen as needed for sore throat, body aches, or fever.  · Don't drive, drink alcohol, or take any other medication or substance that causes sedation while taking cough syrup.   · Follow up with your primary care provider if symptoms persist >10 days or sooner for any new or worsening symptoms.   · Go to the ER for any fever that does not improve with Tylenol/Ibuprofen, neck stiffness, rash, severe headache, vision changes, shortness of breath, chest pain, facial swelling, severe facial pain, or any other new and concerning symptoms.

## 2020-03-02 ENCOUNTER — TELEPHONE (OUTPATIENT)
Dept: URGENT CARE | Facility: CLINIC | Age: 60
End: 2020-03-02

## 2020-03-02 NOTE — TELEPHONE ENCOUNTER
Spoke with pt stated that she is feeling much better. Pt stated thanks for the call. Courtesy call

## 2020-06-19 ENCOUNTER — TELEPHONE (OUTPATIENT)
Dept: ORTHOPEDICS | Facility: CLINIC | Age: 60
End: 2020-06-19

## 2020-06-19 DIAGNOSIS — M25.562 ACUTE PAIN OF LEFT KNEE: Primary | ICD-10-CM

## 2020-06-22 ENCOUNTER — HOSPITAL ENCOUNTER (OUTPATIENT)
Dept: RADIOLOGY | Facility: HOSPITAL | Age: 60
Discharge: HOME | End: 2020-06-22
Attending: ORTHOPAEDIC SURGERY
Payer: COMMERCIAL

## 2020-06-22 ENCOUNTER — PATIENT OUTREACH (OUTPATIENT)
Dept: ADMINISTRATIVE | Facility: OTHER | Age: 60
End: 2020-06-22

## 2020-06-22 ENCOUNTER — OFFICE VISIT (OUTPATIENT)
Dept: ORTHOPEDICS | Facility: CLINIC | Age: 60
End: 2020-06-22
Payer: COMMERCIAL

## 2020-06-22 VITALS
SYSTOLIC BLOOD PRESSURE: 122 MMHG | DIASTOLIC BLOOD PRESSURE: 80 MMHG | WEIGHT: 178 LBS | HEART RATE: 67 BPM | BODY MASS INDEX: 30.39 KG/M2 | HEIGHT: 64 IN

## 2020-06-22 DIAGNOSIS — M21.162 GENU VARUM OF LEFT LOWER EXTREMITY: ICD-10-CM

## 2020-06-22 DIAGNOSIS — M17.12 ARTHRITIS OF KNEE, LEFT: Primary | ICD-10-CM

## 2020-06-22 DIAGNOSIS — M25.562 ACUTE PAIN OF LEFT KNEE: ICD-10-CM

## 2020-06-22 DIAGNOSIS — Z12.31 ENCOUNTER FOR SCREENING MAMMOGRAM FOR MALIGNANT NEOPLASM OF BREAST: Primary | ICD-10-CM

## 2020-06-22 PROCEDURE — 99999 PR PBB SHADOW E&M-EST. PATIENT-LVL IV: ICD-10-PCS | Mod: PBBFAC,,, | Performed by: ORTHOPAEDIC SURGERY

## 2020-06-22 PROCEDURE — 73562 X-RAY EXAM OF KNEE 3: CPT | Mod: TC,RT

## 2020-06-22 PROCEDURE — 73562 X-RAY EXAM OF KNEE 3: CPT | Mod: 26,59,RT, | Performed by: RADIOLOGY

## 2020-06-22 PROCEDURE — 99203 OFFICE O/P NEW LOW 30 MIN: CPT | Mod: S$GLB,,, | Performed by: ORTHOPAEDIC SURGERY

## 2020-06-22 PROCEDURE — 99203 PR OFFICE/OUTPT VISIT, NEW, LEVL III, 30-44 MIN: ICD-10-PCS | Mod: S$GLB,,, | Performed by: ORTHOPAEDIC SURGERY

## 2020-06-22 PROCEDURE — 3008F BODY MASS INDEX DOCD: CPT | Mod: CPTII,S$GLB,, | Performed by: ORTHOPAEDIC SURGERY

## 2020-06-22 PROCEDURE — 73564 X-RAY EXAM KNEE 4 OR MORE: CPT | Mod: 26,LT,, | Performed by: RADIOLOGY

## 2020-06-22 PROCEDURE — 3008F PR BODY MASS INDEX (BMI) DOCUMENTED: ICD-10-PCS | Mod: CPTII,S$GLB,, | Performed by: ORTHOPAEDIC SURGERY

## 2020-06-22 PROCEDURE — 73562 XR KNEE ORTHO LEFT WITH FLEXION: ICD-10-PCS | Mod: 26,59,RT, | Performed by: RADIOLOGY

## 2020-06-22 PROCEDURE — 99999 PR PBB SHADOW E&M-EST. PATIENT-LVL IV: CPT | Mod: PBBFAC,,, | Performed by: ORTHOPAEDIC SURGERY

## 2020-06-22 PROCEDURE — 73564 XR KNEE ORTHO LEFT WITH FLEXION: ICD-10-PCS | Mod: 26,LT,, | Performed by: RADIOLOGY

## 2020-06-22 RX ORDER — DICLOFENAC SODIUM 10 MG/G
2 GEL TOPICAL 3 TIMES DAILY
Qty: 1 TUBE | Refills: 0 | Status: SHIPPED | OUTPATIENT
Start: 2020-06-22 | End: 2021-06-02

## 2020-06-22 NOTE — PROGRESS NOTES
Patient ID: Shirley Amaya  YOB: 1960  MRN: [id number]    Chief Complaint: Pain of the Left Knee    Referred By: Patient did a search    History of Present Illness: Shirley Amaya is a right-hand dominant 60 y.o. female working for the [employer], here for her left knee. Started 3-4 years ago after kneeling down on the concrete. Had x-rays done at the time and they told her it was arthritis. Took meloxicam too, which helped at the beginning. Slowly started not working as well, and pain had gotten better, but never really went away. Worse after sitting a while. Walking usually ok.    Knee Pain   The pain is present in the left knee. The current episode started more than 1 year ago. The problem occurs constantly. The problem has been unchanged. The quality of the pain is described as aching. The pain is at a severity of 6/10. Associated symptoms include joint swelling. Pertinent negatives include no fever or itching. The symptoms are aggravated by sitting and walking (sitting too long). She has tried NSAIDs and brace/corset for the symptoms. The treatment provided mild relief. Physical therapy was not tried.      Past Medical History:   Past Medical History:   Diagnosis Date    Abnormal glucose     Hyperlipidemia     OA (osteoarthritis) of knee     YUDITH on CPAP      Past Surgical History:   Procedure Laterality Date    CERVICAL CONIZATION   W/ LASER      COLONOSCOPY N/A 11/30/2016    Procedure: COLONOSCOPY;  Surgeon: Mary Victor MD;  Location: [facility];  Service: Endoscopy;  Laterality: N/A;     Family History   Problem Relation Age of Onset    Diabetes Father     Lung cancer Sister     Stomach cancer Brother     Heart disease Neg Hx     Hypertension Neg Hx     Colon cancer Neg Hx     Breast cancer Neg Hx      Social History     Socioeconomic History    Marital status:      Spouse name: Not on file    Number of children: 2    Years of  education: Not on file    Highest education level: Not on file   Occupational History    Occupation: Newspaper   Social Needs    Financial resource strain: Not on file    Food insecurity     Worry: Not on file     Inability: Not on file    Transportation needs     Medical: Not on file     Non-medical: Not on file   Tobacco Use    Smoking status: Former Smoker     Packs/day: 2.00     Years: 25.00     Pack years: 50.00     Quit date: 2000     Years since quittin.1    Smokeless tobacco: Never Used   Substance and Sexual Activity    Alcohol use: No     Frequency: Never     Drinks per session: Patient refused     Binge frequency: Patient refused    Drug use: No    Sexual activity: Yes     Partners: Male     Birth control/protection: Post-menopausal   Lifestyle    Physical activity     Days per week: 3 days     Minutes per session: 20 min    Stress: Not at all   Relationships    Social connections     Talks on phone: More than three times a week     Gets together: More than three times a week     Attends [employer] service: Not on file     Active member of club or organization: Yes     Attends meetings of clubs or organizations: More than 4 times per year     Relationship status:    Other Topics Concern    Not on file   Social History Narrative    Not on file     Medication List with Changes/Refills   New Medications    DICLOFENAC SODIUM (VOLTAREN) 1 % GEL    Apply 2 g topically 3 (three) times daily.   Current Medications    ATORVASTATIN (LIPITOR) 20 MG TABLET    Take 1 tablet (20 mg total) by mouth once daily.    CLOBETASOL 0.05% (TEMOVATE) 0.05 % OINT    Apply topically 2 (two) times daily. Do not use for more than 2 weeks continously    MELOXICAM (MOBIC) 15 MG TABLET    Take 1 tablet (15 mg total) by mouth daily as needed for Pain.    METFORMIN (GLUCOPHAGE) 500 MG TABLET    Take 1 tablet (500 mg total) by mouth 2 (two) times daily with meals.    SEMAGLUTIDE (OZEMPIC) 0.25 MG OR 0.5 MG(2  MG/1.5 ML) PNIJ    Inject 0.25 mg into the skin every 7 days.    TRIAMCINOLONE ACETONIDE 0.1% (KENALOG) 0.1 % CREAM    Apply topically 2 (two) times daily. Do not use on face for 14 days     Review of patient's allergies indicates:  No Known Allergies  Review of Systems   Constitution: Negative for fever.   HENT: Negative for sore throat.    Eyes: Negative for blurred vision.   Cardiovascular: Negative for dyspnea on exertion.   Respiratory: Negative for shortness of breath.    Hematologic/Lymphatic: Does not bruise/bleed easily.   Skin: Negative for itching.   Musculoskeletal: Positive for joint pain and joint swelling.   Gastrointestinal: Negative for vomiting.   Genitourinary: Negative for dysuria.   Neurological: Negative for dizziness.   Psychiatric/Behavioral: The patient does not have insomnia.        Physical Exam:   Body mass index is 30.55 kg/m².  Vitals:    06/22/20 0814   BP: 122/80   Pulse: 67      GENERAL: Well appearing, appropriate for stated age, no acute distress.  CARDIOVASCULAR: Pulses regular by peripheral palpation.  PULMONARY: Respirations are even and non-labored.  NEURO: Awake, alert, and oriented x 3.  PSYCH: Mood & affect are appropriate.  HEENT: Head is normocephalic and atraumatic.      General Musculoskeletal Exam   Gait: normal       Right Knee Exam   Right knee exam is normal.    Range of Motion   The patient has normal right knee ROM.  Extension: 0   Flexion: 130     Other   Sensation: normal    Left Knee Exam     Inspection   Effusion: present    Tenderness   The patient tender to palpation of the medial joint line.    Crepitus   The patient has crepitus of the patella.    Range of Motion   Extension:  5 abnormal   Flexion:  120 abnormal     Tests   Stability   MCL - Valgus: abnormal - grade I  LCL - Varus: normal (0 to 2mm)  Patella   Patellar apprehension: trace  Patellar Grind: positive    Other   Sensation: normal    Comments:  Foot wwp    Muscle Strength   Right Lower Extremity    Hip Abduction: 5/5   Quadriceps:  5/5   Hamstrin/5   Left Lower Extremity   Hip Abduction: 5/5   Quadriceps:  5/5   Hamstrin/5         Imaging:    X-ray Knee Ortho Left with Flexion  Narrative: EXAMINATION:  XR KNEE ORTHO LEFT WITH FLEXION    CLINICAL HISTORY:  . Pain in left knee    TECHNIQUE:  AP standing view of both knees, PA flexion standing views of both knees, and Merchant views of both knees were performed. A lateral view of the left knee was also performed.    COMPARISON:  2015    FINDINGS:  Tricompartmental degenerative changes most severe in the medial compartment with marked joint space narrowing and adjacent subchondral sclerosis with marginal spur formation.  Chondrocalcinosis is noted.  No acute fracture or dislocation.  No significant effusion.    Tricompartmental degenerative changes also noted in the contralateral right knee most prominent in the medial compartment with chondrocalcinosis noted as well in the right knee.  Impression: As above    Electronically signed by: Connor Craig MD  Date:    2020  Time:    08:18    Relevant imaging results reviewed and interpreted by me, discussed with the patient and / or family today.     Other Tests:     No other tests performed today.    Assessment:  Shirley Amaya is a 60 y.o. female with several year history of left knee pain    Encounter Diagnoses   Name Primary?    Arthritis of knee, left Yes    Genu varum of left lower extremity       Plan:   Deferred corticosteroid injection   Visco supplementation left knee    brace left knee   Physical therapy - OA protocol   Over the counter anti-inflammatories   Voltaren cream TID prn pain - ok to use off the shelf version   I had a long discussion with the patient about the causes, treatments, and prognosis for knee arthritis. We discussed that although there is not a cure for arthritis, there are effective ways to improve symptoms. I recommended low impact  activities such as elliptical and bicycle. I talked about the fact that aquatic and pool therapy is often helpful. I advised the patient to consider good quality stability and cushioned shoes. We talked about the importance of knee motion in the health of the knee. The patient can also use a compression knee sleeve to help limit swelling and provide proprioceptive feedback. We recommend formal physical therapy or at minimum a home exercise program, which we discussed with the patient. I advised that over the counter solutions such as glucosamine and chondroitin may help with symptoms.   Treatment plan was developed with input from the patient/family, and they expressed understanding and agreement with the plan. All questions were answered today.    Follow-up: for VISCO shots, then 8-12 weeks after shots or sooner if there are any problems between now and then.    MEDICAL NECESSITY FOR VISCOSUPPLEMENTATION: After thorough evaluation of the patient, I have determined that visco-supplementation is medically necessary. The patient has painful DJD of the knee with failure of conservative therapy including lifestyle modifications and rehabilitation exercises. Oral analgesis/NSAIDs have not adequately controlled symptoms and there is radiographic evidence of joint space narrowing, subchondral sclerosis, and some early osteophytic changes, or in lack of radiographic evidence, there is arthroscopic or other evidence of chondrosis.    Disclaimer: This note was prepared using a voice recognition system and is likely to have sound alike errors within the text.

## 2020-06-22 NOTE — PATIENT INSTRUCTIONS
Assessment:  Shirley Amaya is a 60 y.o. female with several year history of left knee pain    Encounter Diagnoses   Name Primary?    Arthritis of knee, left Yes    Genu varum of left lower extremity         Plan:   Deferred corticosteroid injection   Visco supplementation left knee    brace left knee   Physical therapy - OA protocol   Over the counter anti-inflammatories   Voltaren cream TID prn pain - ok to use off the shelf version   Treatment plan was developed with input from the patient/family, and they expressed understanding and agreement with the plan. All questions were answered today.    Follow-up: for VISCO shots, then 8-12 weeks after shots or sooner if there are any problems between now and then.    Thank you for choosing [facility] and Dr. Jose Hatfield for your orthopedic & sports medicine care. It is our goal to provide you with exceptional care that will help keep you healthy, active, and get you back in the game.    If you felt that you received exemplary care today, please consider leaving us feedback on [employer] at [email or web address]     Please do not hesitate to reach out to us via email, phone, or MyChart with any questions, concerns, or feedback.    If you are experiencing pain/discomfort ,or have questions after 5pm and would like to be connected to the [facility] on-call team, please call this number and specify which Sports Medicine provider is treating you: [phone]

## 2020-06-23 DIAGNOSIS — M17.12 PRIMARY OSTEOARTHRITIS OF LEFT KNEE: Primary | ICD-10-CM

## 2020-06-29 ENCOUNTER — CLINICAL SUPPORT (OUTPATIENT)
Dept: REHABILITATION | Facility: HOSPITAL | Age: 60
End: 2020-06-29
Attending: ORTHOPAEDIC SURGERY
Payer: COMMERCIAL

## 2020-06-29 DIAGNOSIS — M17.12 ARTHRITIS OF KNEE, LEFT: ICD-10-CM

## 2020-06-29 PROCEDURE — 97161 PT EVAL LOW COMPLEX 20 MIN: CPT | Performed by: PHYSICAL THERAPIST

## 2020-06-29 NOTE — PLAN OF CARE
[facility]  Physical Therapy Initial Evaluation    Name: Shirley Amaya  Clinic Number: [id number]    Therapy Diagnosis:   Encounter Diagnosis   Name Primary?    Arthritis of knee, left      Physician: Jose Hatfield MD    Physician Orders: PT Eval and Treat  Medical Diagnosis from Referral: Arthritis of left knee  Evaluation Date: 6/29/2020  Authorization Period Expiration: 9/27/2020  Plan of Care Expiration: 8/28/2020  Visit # / Visits authorized: 1/ 12    Precautions: Standard    Time In: 1415  Time Out: 1500  Total Billable Time: 5 minutes    SUBJECTIVE   Date of onset: 3 years ago, was doing a sidewalk on kees for 2 hours straight and when she got up she had pain, used cane over time, started getting better, hasn't gone completely, meloxicam helped a lot  History of current condition - Shirley is a 60 y.o. female whom reports she doesn't let her knee stop her from doing anything.  Yardwork is difficult, can barely walk at end of yardwork day... Never the same, sometimes relief with walking, sometimes increased pain with walking, sometimes crunching sounds in the knee, sometimes popping.  She reports that she was going to the gym.  Patient reports she presents today with intentions of coming to one physical therapy appointment.  She reports she has informed her MD that she will only be attending one visit.     Medical History:   Past Medical History:   Diagnosis Date    Abnormal glucose     Hyperlipidemia     OA (osteoarthritis) of knee     YUDITH on CPAP      Surgical History:   Shirley Amaya  has a past surgical history that includes Cervical conization w/ laser and Colonoscopy (N/A, 11/30/2016).    Medications:   Shirley has a current medication list which includes the following prescription(s): atorvastatin, clobetasol 0.05%, diclofenac sodium, meloxicam, metformin, semaglutide, and triamcinolone acetonide 0.1%, and the following Facility-Administered  Medications: sodium hyaluronate (euflexxa).    Allergies:   Review of patient's allergies indicates:  No Known Allergies     Imaging: Knee X-Ray on FINDINGS:  Tricompartmental degenerative changes most severe in the medial compartment with marked joint space narrowing and adjacent subchondral sclerosis with marginal spur formation.  Chondrocalcinosis is noted.  No acute fracture or dislocation.  No significant effusion.     Tricompartmental degenerative changes also noted in the contralateral right knee most prominent in the medial compartment with chondrocalcinosis noted as well in the right knee.    Prior Therapy: N/A  Social History: Pt lives with their spouse and mother  Occupation: Pt works for [employer], sitting primarily, but does a lot of walking one day of the week.  Prior Level of Function: Independent with activities of daily living, experiences pain afterwards  Current Level of Function: patient reports she experiences pain after activity    Pain:  Current 3/10, took meloxicam and tylenol, worst 7/10, best 2/10   Location: left knee(s)  Description: Aching and constant toothache  Aggravating Factors: standing from prolonged sitting, prolonged standing  Easing Factors: Meloxicam and Tylenol    Dominant Extremity: Right    Pts goals: Patient reported goals are to decrease pain.    OBJECTIVE   (x = not tested due to pain and/or inability to obtain test position)    RANGE OF MOTION:    Hip ROM Right  6/29/2020 Left  6/29/2020 Pain/Dysfunction with Movement Goal   Hip Flexion (120) WFL WFL     Hip Extension (30) WFL WFL     Hip Abduction (45) WFL WFL     Hip IR (45) WFL WFL     Hip ER (45) WFL WFL       Knee ROM Right  6/29/2020 Left  6/29/2020 Pain/Dysfunction with Movement Goal   Knee Flexion (135) 140 124     Knee Extension (0) 0 10       STRENGTH:    L/E MMT Right  6/29/2020 Left  6/29/2020 Pain/Dysfunction with Movement Goal   Hip Flexion  5/5 5/5  5/5 B    Hip Extension  4/5 4-/5  5/5 B   Hip Abduction   4/5 4/5  5/5 B   Knee Extension 5/5 4-/5  5/5 B   Knee Flexion 4+/5 4-/5  5/5 B   Hip IR 4/5 4/5  5/5 B   Hip ER 4/5 4-/5  5/5 B   Ankle DF 5/5 4+/5  5/5 B   Ankle PF 5/5 4+/5  5/5 B     MUSCLE LENGTH:     Muscle Tested  Right  6/29/2020 Left   6/29/2020 Goal   Quadratus Lumborum normal normal Normal B   Hip Flexors  normal decreased Normal B   Quadriceps normal decreased Normal B   Hamstrings  normal decreased Normal B     JOINT MOBILITY:     Joint Motion Tested Right  (spine)  6/29/2020 Left   6/29/2020 Goal   Tibiofemoral Not tested Not tested ---       SPECIAL TESTS:  N/A    Sensation:  Sensation is intact to light touch    Palpation:  Increased tenderness noted with palpation of left medial epicondyle.     Posture:  Pt presents with postural abnormalities which include: left knee flexion     Gait Analysis: The patient ambulated with the following assistive device: none; the pt presents with the following gait abnormalities: decreased step length on L  and decreased L knee flexion    Movement Analysis:  Patient maintains left knee flexion in transfer to standing.    Balance: Unable to balance in single leg stance.    FUNCTION:     CMS Impairment/Limitation/Restriction for FOTO Knee Survey    Therapist reviewed FOTO scores for Shirley Amaya on 6/29/2020.   FOTO documents entered into [employer] - see Media section.    Limitation Score: 33%         TREATMENT   Treatment Time In: 1455  Treatment Time Out: 1500  Total Treatment time separate from Evaluation: 5 minutes    Shirley received therapeutic exercises to develop strength, ROM, flexibility and core stabilization for 5 minutes including:    Exercise 6/29/2020   Hamstring stretch 3 x 30 seconds   Gluteal set 10 x 10 seconds                           x = exercise details same as prior session    Home Exercises and Patient Education Provided    Education/Self-Care provided:    Patient educated on the impairments noted above and the effects of physical  "therapy intervention to improve overall condition and QOL.     Written Home Exercises Provided: yes.  Exercises were reviewed and Shirley was able to demonstrate them prior to the end of the session.  Shirley demonstrated good  understanding of the education provided.     See EMR under Patient Instructions for exercises provided 6/29/2020.    ASSESSMENT   Shirley is a 60 y.o. female referred to outpatient Physical Therapy with a medical diagnosis of arthritis of left knee. Pt presents with impairments including: decreased ROM, decreased strength, decreased muscle length, impaired balance and decreased overall function.    Pt prognosis is Fair.   Pt will benefit from skilled outpatient Physical Therapy to address the deficits stated above and in the chart below, provide pt/family education, and to maximize pt's level of independence.     Plan of care discussed with patient: Yes  Pt's spiritual, cultural and educational needs considered and patient is agreeable to the plan of care and goals as stated below:     Anticipated Barriers for therapy: chronicity of condition, motivation to improve condition, adherence to treatment plan and coping style    Medical Necessity is demonstrated by the following  History  Co-morbidities and personal factors that may impact the plan of care Co-morbidities:   Abnormal glucose, YUDITH on CPAP    Personal Factors:   coping style  attitudes     moderate   Examination  Body Structures and Functions, activity limitations and participation restrictions that may impact the plan of care Body Regions:   lower extremities    Body Systems:    ROM  strength  balance    Participation Restrictions:   See above in "Current Level of Function"     Activity limitations:   Learning and applying knowledge  no deficits    General Tasks and Commands  no deficits    Communication  no deficits    Mobility  walking    Self care  no deficits    Domestic Life  doing house work (cleaning house, washing dishes, " laundry)  doing yardwork    Interactions/Relationships  no deficits    Life Areas  no deficits    Community and Social Life  community life  recreation and leisure         moderate   Clinical Presentation stable and uncomplicated low   Decision Making/ Complexity Score: low       GOALS:    Short Term Goals:  4 weeks    1. Pain: Pt will demonstrate improved pain by reports of less than or equal to 6/10 worst pain on the verbal rating scale in order to progress toward maximal functional ability and improve QOL.    2. Function: Patient will demonstrate improved function as indicated by a functional limitation score of less than or equal to 32 out of 100 on FOTO.    3. Mobility: Patient will improve AROM to 50% of stated goals, listed in objective measures above, in order to progress towards independence with functional activities.     4. Strength: Patient will improve strength to 50% of stated goals, listed in objective measures above, in order to progress towards independence with functional activities.     5. HEP: Patient will demonstrate independence with HEP in order to progress toward functional independence.          Long Term Goals:  8 weeks    1. Pain: Pt will demonstrate improved pain by reports of less than or equal to 5/10 worst pain on the verbal rating scale in order to progress toward maximal functional ability and improve QOL.      2. Function: Patient will demonstrate improved function as indicated by a functional limitation score of less than or equal to 30 out of 100 on FOTO.    3. Mobility: Patient will improve AROM to stated goals, listed in objective measures above, in order to return to maximal functional potential and improve quality of life.    4. Strength: Patient will improve strength to stated goals, listed in objective measures above, in order to improve functional independence and quality of life.    5. Patient will return to normal ADL's, IADL's, community involvement, recreational  activities, and work-related activities with less than or equal to 5/10 pain and maximal function.             PLAN   Plan of care Certification: 6/29/2020 to 8/28/2020.    Outpatient Physical Therapy 2 times weekly for 8 weeks to include any combination of the following interventions: dry needling, modalities, electrical stimulation (IFC, Pre-Mod, Attended with Functional Dry Needling), Manual Therapy, Neuromuscular Re-ed, Patient Education, Self Care, Therapeutic Activites and Therapeutic Exercise     Thank you for this referral.    These services are reasonable and necessary for the conditions set forth above while under my care.    Nilda Ramos, PT, DPT, CSCS, PPCES

## 2020-07-08 ENCOUNTER — TELEPHONE (OUTPATIENT)
Dept: REHABILITATION | Facility: HOSPITAL | Age: 60
End: 2020-07-08

## 2020-07-08 ENCOUNTER — DOCUMENTATION ONLY (OUTPATIENT)
Dept: REHABILITATION | Facility: HOSPITAL | Age: 60
End: 2020-07-08

## 2020-07-08 ENCOUNTER — HOSPITAL ENCOUNTER (OUTPATIENT)
Dept: RADIOLOGY | Facility: HOSPITAL | Age: 60
Discharge: HOME | End: 2020-07-08
Attending: FAMILY MEDICINE
Payer: COMMERCIAL

## 2020-07-08 VITALS — HEIGHT: 64 IN | WEIGHT: 177.94 LBS | BODY MASS INDEX: 30.38 KG/M2

## 2020-07-08 DIAGNOSIS — Z12.31 ENCOUNTER FOR SCREENING MAMMOGRAM FOR MALIGNANT NEOPLASM OF BREAST: ICD-10-CM

## 2020-07-08 PROCEDURE — 77067 SCR MAMMO BI INCL CAD: CPT | Mod: TC

## 2020-07-08 PROCEDURE — 77063 MAMMO DIGITAL SCREENING BILAT WITH TOMOSYNTHESIS_CAD: ICD-10-PCS | Mod: 26,,, | Performed by: RADIOLOGY

## 2020-07-08 PROCEDURE — 77067 MAMMO DIGITAL SCREENING BILAT WITH TOMOSYNTHESIS_CAD: ICD-10-PCS | Mod: 26,,, | Performed by: RADIOLOGY

## 2020-07-08 PROCEDURE — 77063 BREAST TOMOSYNTHESIS BI: CPT | Mod: 26,,, | Performed by: RADIOLOGY

## 2020-07-08 PROCEDURE — 77067 SCR MAMMO BI INCL CAD: CPT | Mod: 26,,, | Performed by: RADIOLOGY

## 2020-07-08 NOTE — TELEPHONE ENCOUNTER
Followed up with patient via phone call in regards to scheduling return visits with physical therapy.  Patient reported receiving an estimate of $400 from insurance.  She reports that she is also waiting for her brace and does not wish to schedule at this point for fear of cost.  Encouraged patient to seek explanation of benefits for more information on coverage.  Explained to patient that if she needs anything to please reach out, but we will discharge at this time.  Patient agreed.

## 2020-07-08 NOTE — PROGRESS NOTES
Outpatient Therapy Discharge Summary     Name: Shirley Amaya  Clinic Number: [id number]    Therapy Diagnosis: Arthritis of knee, left  Physician: Jose Hatfield MD    Physician Orders: PT Eval and Treat  Medical Diagnosis: Arthritis of left knee  Evaluation Date: 6/29/2020      Date of Last visit: 6/29/2020  Total Visits Received: 1  Cancelled Visits: 0  No Show Visits: 0    Assessment    Goals:   Short Term Goals:  4 weeks     1. Pain: Pt will demonstrate improved pain by reports of less than or equal to 6/10 worst pain on the verbal rating scale in order to progress toward maximal functional ability and improve QOL.     2. Function: Patient will demonstrate improved function as indicated by a functional limitation score of less than or equal to 32 out of 100 on FOTO.     3. Mobility: Patient will improve AROM to 50% of stated goals, listed in objective measures above, in order to progress towards independence with functional activities.      4. Strength: Patient will improve strength to 50% of stated goals, listed in objective measures above, in order to progress towards independence with functional activities.      5. HEP: Patient will demonstrate independence with HEP in order to progress toward functional independence.              Long Term Goals:  8 weeks     1. Pain: Pt will demonstrate improved pain by reports of less than or equal to 5/10 worst pain on the verbal rating scale in order to progress toward maximal functional ability and improve QOL.       2. Function: Patient will demonstrate improved function as indicated by a functional limitation score of less than or equal to 30 out of 100 on FOTO.     3. Mobility: Patient will improve AROM to stated goals, listed in objective measures above, in order to return to maximal functional potential and improve quality of life.     4. Strength: Patient will improve strength to stated goals, listed in objective measures above, in order to improve  functional independence and quality of life.     5. Patient will return to normal ADL's, IADL's, community involvement, recreational activities, and work-related activities with less than or equal to 5/10 pain and maximal function.                 Discharge reason: Patient requested discharge due to financial concerns and would prefer to try knee brace first.    Plan   This patient is discharged from Physical Therapy.

## 2020-07-09 ENCOUNTER — TELEPHONE (OUTPATIENT)
Dept: ORTHOPEDICS | Facility: CLINIC | Age: 60
End: 2020-07-09

## 2020-07-09 NOTE — TELEPHONE ENCOUNTER
Discussed appointments with pt, and rescheduled accordingly. Pt confirmed appt dates, times, and locations, AL, LPN.     ----- Message from Agatha Chaudhry sent at 7/9/2020  3:38 PM CDT -----  Regarding: Appointment  Contact: pt  Stated she's calling to change her appointments for 08/12, 08/19, she can be reached at [phone] Thanks

## 2020-07-13 ENCOUNTER — LAB VISIT (OUTPATIENT)
Dept: LAB | Facility: HOSPITAL | Age: 60
End: 2020-07-13
Attending: FAMILY MEDICINE
Payer: COMMERCIAL

## 2020-07-13 DIAGNOSIS — R73.09 ABNORMAL GLUCOSE: ICD-10-CM

## 2020-07-13 PROCEDURE — 83036 HEMOGLOBIN GLYCOSYLATED A1C: CPT

## 2020-07-13 PROCEDURE — 36415 COLL VENOUS BLD VENIPUNCTURE: CPT | Mod: PO

## 2020-07-14 LAB
ESTIMATED AVG GLUCOSE: 114 MG/DL (ref 68–131)
HBA1C MFR BLD HPLC: 5.6 % (ref 4–5.6)

## 2020-07-15 DIAGNOSIS — M17.12 ARTHRITIS OF KNEE, LEFT: Primary | ICD-10-CM

## 2020-07-20 ENCOUNTER — OFFICE VISIT (OUTPATIENT)
Dept: INTERNAL MEDICINE | Facility: CLINIC | Age: 60
End: 2020-07-20
Payer: COMMERCIAL

## 2020-07-20 VITALS
HEIGHT: 64 IN | WEIGHT: 171.31 LBS | BODY MASS INDEX: 29.24 KG/M2 | TEMPERATURE: 98 F | HEART RATE: 60 BPM | DIASTOLIC BLOOD PRESSURE: 68 MMHG | SYSTOLIC BLOOD PRESSURE: 120 MMHG

## 2020-07-20 DIAGNOSIS — G89.29 CHRONIC PAIN OF BOTH KNEES: ICD-10-CM

## 2020-07-20 DIAGNOSIS — M25.562 CHRONIC PAIN OF BOTH KNEES: ICD-10-CM

## 2020-07-20 DIAGNOSIS — G47.33 OSA ON CPAP: ICD-10-CM

## 2020-07-20 DIAGNOSIS — R73.09 ABNORMAL GLUCOSE: Primary | ICD-10-CM

## 2020-07-20 DIAGNOSIS — E78.5 HYPERLIPIDEMIA, UNSPECIFIED HYPERLIPIDEMIA TYPE: ICD-10-CM

## 2020-07-20 DIAGNOSIS — Z29.9 PREVENTIVE MEASURE: ICD-10-CM

## 2020-07-20 DIAGNOSIS — M25.561 CHRONIC PAIN OF BOTH KNEES: ICD-10-CM

## 2020-07-20 PROCEDURE — 99999 PR PBB SHADOW E&M-EST. PATIENT-LVL III: CPT | Mod: PBBFAC,,, | Performed by: FAMILY MEDICINE

## 2020-07-20 PROCEDURE — 3008F BODY MASS INDEX DOCD: CPT | Mod: CPTII,S$GLB,, | Performed by: FAMILY MEDICINE

## 2020-07-20 PROCEDURE — 3008F PR BODY MASS INDEX (BMI) DOCUMENTED: ICD-10-PCS | Mod: CPTII,S$GLB,, | Performed by: FAMILY MEDICINE

## 2020-07-20 PROCEDURE — 99999 PR PBB SHADOW E&M-EST. PATIENT-LVL III: ICD-10-PCS | Mod: PBBFAC,,, | Performed by: FAMILY MEDICINE

## 2020-07-20 PROCEDURE — 99214 PR OFFICE/OUTPT VISIT, EST, LEVL IV, 30-39 MIN: ICD-10-PCS | Mod: S$GLB,,, | Performed by: FAMILY MEDICINE

## 2020-07-20 PROCEDURE — 99214 OFFICE O/P EST MOD 30 MIN: CPT | Mod: S$GLB,,, | Performed by: FAMILY MEDICINE

## 2020-07-20 RX ORDER — MELOXICAM 15 MG/1
15 TABLET ORAL DAILY PRN
Qty: 30 TABLET | Refills: 3 | Status: SHIPPED | OUTPATIENT
Start: 2020-07-20 | End: 2021-01-25 | Stop reason: SDUPTHER

## 2020-07-20 NOTE — PROGRESS NOTES
"Subjective:      Patient ID: Shirley Amaya is a 60 y.o. female.    Chief Complaint:  F/U chronic conditions    HPI  59 yo female here for f/u.  She is down an additional 15 lbs since starting Ozempic and is doing well on the 0.25mg.  She is also taking 2 metformin.  She is stable on statin/no issues  Using Mobic for OA L knee/saw Ortho//waiting on approval for synvisc.  Using miralax daily, that keeps her bowels moving.  Using CpAP    Past Medical History:   Diagnosis Date    Abnormal glucose     Hyperlipidemia     OA (osteoarthritis) of knee     YUDITH on CPAP      Family History   Problem Relation Age of Onset    Diabetes Father     Lung cancer Sister     Stomach cancer Brother     Heart disease Neg Hx     Hypertension Neg Hx     Colon cancer Neg Hx     Breast cancer Neg Hx      Past Surgical History:   Procedure Laterality Date    CERVICAL CONIZATION   W/ LASER      COLONOSCOPY N/A 2016    Procedure: COLONOSCOPY;  Surgeon: Mary Victor MD;  Location: [facility];  Service: Endoscopy;  Laterality: N/A;     Social History     Tobacco Use    Smoking status: Former Smoker     Packs/day: 2.00     Years: 25.00     Pack years: 50.00     Quit date: 2000     Years since quittin.2    Smokeless tobacco: Never Used   Substance Use Topics    Alcohol use: No     Frequency: Never     Drinks per session: Patient refused     Binge frequency: Patient refused    Drug use: No       /68   Pulse 60   Temp 98.1 °F (36.7 °C) (Temporal)   Ht 5' 4" (1.626 m)   Wt 77.7 kg (171 lb 4.8 oz)   BMI 29.40 kg/m²     Review of Systems   Constitutional: Negative for activity change and unexpected weight change.   HENT: Negative for hearing loss, rhinorrhea and trouble swallowing.    Eyes: Negative for discharge and visual disturbance.   Respiratory: Negative for chest tightness and wheezing.    Cardiovascular: Negative for chest pain and palpitations.   Gastrointestinal: Positive for " constipation. Negative for blood in stool, diarrhea and vomiting.   Endocrine: Negative for polydipsia and polyuria.   Genitourinary: Negative for difficulty urinating, dysuria, hematuria and menstrual problem.   Musculoskeletal: Negative for arthralgias, joint swelling and neck pain.   Neurological: Negative for weakness and headaches.   Psychiatric/Behavioral: Negative for confusion and dysphoric mood.       Objective:     Physical Exam  Vitals signs and nursing note reviewed.   Constitutional:       General: She is not in acute distress.     Appearance: She is well-developed.   HENT:      Right Ear: External ear normal.      Left Ear: External ear normal.   Eyes:      Conjunctiva/sclera: Conjunctivae normal.      Pupils: Pupils are equal, round, and reactive to light.   Neck:      Musculoskeletal: Normal range of motion and neck supple.      Thyroid: No thyromegaly.   Cardiovascular:      Rate and Rhythm: Normal rate and regular rhythm.      Heart sounds: Normal heart sounds. No murmur. No gallop.    Pulmonary:      Effort: Pulmonary effort is normal. No respiratory distress.      Breath sounds: Normal breath sounds. No wheezing or rales.   Abdominal:      General: Bowel sounds are normal. There is no distension.      Palpations: Abdomen is soft.      Tenderness: There is no abdominal tenderness. There is no guarding.   Musculoskeletal:      Right lower leg: No edema.      Left lower leg: No edema.   Skin:     General: Skin is warm and dry.      Findings: No rash.   Neurological:      Mental Status: She is alert and oriented to person, place, and time.      Cranial Nerves: No cranial nerve deficit.   Psychiatric:         Behavior: Behavior normal.         Thought Content: Thought content normal.         Judgment: Judgment normal.         Lab Results   Component Value Date    WBC 5.55 01/14/2020    HGB 12.0 01/14/2020    HCT 38.8 01/14/2020     01/14/2020    CHOL 163 01/14/2020    TRIG 106 01/14/2020    HDL  56 01/14/2020    ALT 14 01/14/2020    AST 19 01/14/2020     01/14/2020    K 3.9 01/14/2020     01/14/2020    CREATININE 0.6 01/14/2020    BUN 17 01/14/2020    CO2 28 01/14/2020    TSH 1.680 01/14/2020    HGBA1C 5.6 07/13/2020       Assessment:     1. Abnormal glucose    2. Hyperlipidemia, unspecified hyperlipidemia type    3. YUDITH on CPAP    4. Preventive measure    5. Chronic pain of both knees         Plan:     Abnormal glucose    Hyperlipidemia, unspecified hyperlipidemia type    YUDITH on CPAP    Preventive measure  -     CBC auto differential; Future; Expected date: 01/20/2021  -     Comprehensive metabolic panel; Future; Expected date: 01/20/2021  -     Hemoglobin A1C; Future; Expected date: 01/20/2021  -     Lipid Panel; Future; Expected date: 01/20/2021  -     TSH; Future; Expected date: 01/20/2021  -     Vitamin D; Future; Expected date: 01/20/2021    Chronic pain of both knees  -     meloxicam (MOBIC) 15 MG tablet; Take 1 tablet (15 mg total) by mouth daily as needed for Pain.  Dispense: 30 tablet; Refill: 3    Other orders  -     semaglutide (OZEMPIC) 0.25 mg or 0.5 mg(2 mg/1.5 mL) PnIj; Inject 0.25 mg into the skin every 7 days.  Dispense: 1.5 mL; Refill: 6      Normal BP  Weight is looking great  A1C is great  Cont current meds  Refills today  Shingrix if desired  F/u 6 mos//annual

## 2020-08-03 ENCOUNTER — TELEPHONE (OUTPATIENT)
Dept: ORTHOPEDICS | Facility: CLINIC | Age: 60
End: 2020-08-03

## 2020-08-03 NOTE — TELEPHONE ENCOUNTER
Spoke w/ patient in regards to her upcoming appts. Informed her that I would need to r/s her appts due to her injections not being approved as of yet. Patient agreed and patient was schedule for all three. Patient verbalized understanding and was grateful for the call_DD

## 2020-08-19 ENCOUNTER — PROCEDURE VISIT (OUTPATIENT)
Dept: ORTHOPEDICS | Facility: CLINIC | Age: 60
End: 2020-08-19
Payer: COMMERCIAL

## 2020-08-19 VITALS
BODY MASS INDEX: 29.19 KG/M2 | HEIGHT: 64 IN | DIASTOLIC BLOOD PRESSURE: 76 MMHG | SYSTOLIC BLOOD PRESSURE: 139 MMHG | WEIGHT: 171 LBS | HEART RATE: 64 BPM

## 2020-08-19 DIAGNOSIS — M17.12 PRIMARY OSTEOARTHRITIS OF LEFT KNEE: Primary | ICD-10-CM

## 2020-08-19 PROCEDURE — 20610 DRAIN/INJ JOINT/BURSA W/O US: CPT | Mod: LT,S$GLB,, | Performed by: PHYSICIAN ASSISTANT

## 2020-08-19 PROCEDURE — 20610 LARGE JOINT ASPIRATION/INJECTION: L KNEE: ICD-10-PCS | Mod: LT,S$GLB,, | Performed by: PHYSICIAN ASSISTANT

## 2020-08-19 NOTE — PROCEDURES
Large Joint Aspiration/Injection: L knee    Date/Time: 8/19/2020 8:00 AM  Performed by: Pamela Menezes PA-C  Authorized by: Pamela Menezes PA-C     Consent Done?:  Yes (Verbal)  Indications:  Joint swelling and pain  Site marked: the procedure site was marked    Timeout: prior to procedure the correct patient, procedure, and site was verified    Prep: patient was prepped and draped in usual sterile fashion      Local anesthesia used?: Yes    Local anesthetic:  Topical anesthetic    Details:  Needle Size:  22 G  Ultrasonic Guidance for needle placement?: No    Approach:  Superior  Location:  Knee  Site:  L knee  Medications:  20 mg sodium hyaluronate (EUFLEXXA) 10 mg/mL(mw 2.4 -3.6 million)  Patient tolerance:  Patient tolerated the procedure well with no immediate complications     Injection 1/3

## 2020-08-26 ENCOUNTER — PROCEDURE VISIT (OUTPATIENT)
Dept: ORTHOPEDICS | Facility: CLINIC | Age: 60
End: 2020-08-26
Payer: COMMERCIAL

## 2020-08-26 VITALS
HEIGHT: 64 IN | WEIGHT: 171 LBS | HEART RATE: 62 BPM | DIASTOLIC BLOOD PRESSURE: 76 MMHG | SYSTOLIC BLOOD PRESSURE: 130 MMHG | BODY MASS INDEX: 29.19 KG/M2

## 2020-08-26 DIAGNOSIS — M17.12 PRIMARY OSTEOARTHRITIS OF LEFT KNEE: Primary | ICD-10-CM

## 2020-08-26 PROCEDURE — 20610 DRAIN/INJ JOINT/BURSA W/O US: CPT | Mod: LT,S$GLB,, | Performed by: PHYSICIAN ASSISTANT

## 2020-08-26 PROCEDURE — 20610 LARGE JOINT ASPIRATION/INJECTION: L KNEE: ICD-10-PCS | Mod: LT,S$GLB,, | Performed by: PHYSICIAN ASSISTANT

## 2020-08-26 NOTE — PROCEDURES
Large Joint Aspiration/Injection: L knee    Date/Time: 8/26/2020 8:00 AM  Performed by: Pamela Menezes PA-C  Authorized by: Pamela Menezes PA-C     Consent Done?:  Yes (Verbal)  Indications:  Joint swelling and pain  Site marked: the procedure site was marked    Timeout: prior to procedure the correct patient, procedure, and site was verified    Prep: patient was prepped and draped in usual sterile fashion      Local anesthesia used?: Yes    Local anesthetic:  Topical anesthetic    Details:  Needle Size:  22 G  Ultrasonic Guidance for needle placement?: No    Approach:  Superior  Location:  Knee  Site:  L knee  Medications:  20 mg sodium hyaluronate (EUFLEXXA) 10 mg/mL(mw 2.4 -3.6 million)  Patient tolerance:  Patient tolerated the procedure well with no immediate complications

## 2020-09-02 ENCOUNTER — PROCEDURE VISIT (OUTPATIENT)
Dept: ORTHOPEDICS | Facility: CLINIC | Age: 60
End: 2020-09-02
Payer: COMMERCIAL

## 2020-09-02 VITALS
WEIGHT: 171 LBS | BODY MASS INDEX: 29.19 KG/M2 | HEIGHT: 64 IN | SYSTOLIC BLOOD PRESSURE: 126 MMHG | HEART RATE: 67 BPM | DIASTOLIC BLOOD PRESSURE: 67 MMHG

## 2020-09-02 DIAGNOSIS — M17.12 PRIMARY OSTEOARTHRITIS OF LEFT KNEE: Primary | ICD-10-CM

## 2020-09-02 PROCEDURE — 20610 DRAIN/INJ JOINT/BURSA W/O US: CPT | Mod: LT,S$GLB,, | Performed by: PHYSICIAN ASSISTANT

## 2020-09-02 PROCEDURE — 20610 LARGE JOINT ASPIRATION/INJECTION: L KNEE: ICD-10-PCS | Mod: LT,S$GLB,, | Performed by: PHYSICIAN ASSISTANT

## 2020-09-02 NOTE — PROCEDURES
Large Joint Aspiration/Injection: L knee    Date/Time: 9/2/2020 8:00 AM  Performed by: Pamela Menezes PA-C  Authorized by: Pamela Menezes PA-C     Consent Done?:  Yes (Verbal)  Indications:  Joint swelling and pain  Site marked: the procedure site was marked    Timeout: prior to procedure the correct patient, procedure, and site was verified    Prep: patient was prepped and draped in usual sterile fashion      Local anesthesia used?: Yes    Local anesthetic:  Topical anesthetic    Details:  Needle Size:  22 G  Ultrasonic Guidance for needle placement?: No    Approach:  Superior  Location:  Knee  Site:  L knee  Medications:  20 mg sodium hyaluronate (EUFLEXXA) 10 mg/mL(mw 2.4 -3.6 million)  Patient tolerance:  Patient tolerated the procedure well with no immediate complications     Injection 3/3

## 2021-01-19 ENCOUNTER — LAB VISIT (OUTPATIENT)
Dept: LAB | Facility: HOSPITAL | Age: 61
End: 2021-01-19
Attending: FAMILY MEDICINE
Payer: COMMERCIAL

## 2021-01-19 DIAGNOSIS — Z29.9 PREVENTIVE MEASURE: ICD-10-CM

## 2021-01-19 LAB
BASOPHILS # BLD AUTO: 0.04 K/UL (ref 0–0.2)
BASOPHILS NFR BLD: 0.5 % (ref 0–1.9)
DIFFERENTIAL METHOD: ABNORMAL
EOSINOPHIL # BLD AUTO: 0.2 K/UL (ref 0–0.5)
EOSINOPHIL NFR BLD: 2.4 % (ref 0–8)
ERYTHROCYTE [DISTWIDTH] IN BLOOD BY AUTOMATED COUNT: 13.2 % (ref 11.5–14.5)
HCT VFR BLD AUTO: 38.8 % (ref 37–48.5)
HGB BLD-MCNC: 12 G/DL (ref 12–16)
IMM GRANULOCYTES # BLD AUTO: 0.04 K/UL (ref 0–0.04)
IMM GRANULOCYTES NFR BLD AUTO: 0.5 % (ref 0–0.5)
LYMPHOCYTES # BLD AUTO: 2.5 K/UL (ref 1–4.8)
LYMPHOCYTES NFR BLD: 31.3 % (ref 18–48)
MCH RBC QN AUTO: 29.3 PG (ref 27–31)
MCHC RBC AUTO-ENTMCNC: 30.9 G/DL (ref 32–36)
MCV RBC AUTO: 95 FL (ref 82–98)
MONOCYTES # BLD AUTO: 0.4 K/UL (ref 0.3–1)
MONOCYTES NFR BLD: 5.1 % (ref 4–15)
NEUTROPHILS # BLD AUTO: 4.7 K/UL (ref 1.8–7.7)
NEUTROPHILS NFR BLD: 60.2 % (ref 38–73)
NRBC BLD-RTO: 0 /100 WBC
PLATELET # BLD AUTO: 271 K/UL (ref 150–350)
PMV BLD AUTO: 10.3 FL (ref 9.2–12.9)
RBC # BLD AUTO: 4.09 M/UL (ref 4–5.4)
WBC # BLD AUTO: 7.82 K/UL (ref 3.9–12.7)

## 2021-01-19 PROCEDURE — 36415 COLL VENOUS BLD VENIPUNCTURE: CPT | Mod: PO

## 2021-01-19 PROCEDURE — 80053 COMPREHEN METABOLIC PANEL: CPT

## 2021-01-19 PROCEDURE — 80061 LIPID PANEL: CPT

## 2021-01-19 PROCEDURE — 83036 HEMOGLOBIN GLYCOSYLATED A1C: CPT

## 2021-01-19 PROCEDURE — 82306 VITAMIN D 25 HYDROXY: CPT

## 2021-01-19 PROCEDURE — 85025 COMPLETE CBC W/AUTO DIFF WBC: CPT

## 2021-01-19 PROCEDURE — 84443 ASSAY THYROID STIM HORMONE: CPT

## 2021-01-20 DIAGNOSIS — E11.9 TYPE 2 DIABETES MELLITUS WITHOUT COMPLICATION: ICD-10-CM

## 2021-01-20 LAB
25(OH)D3+25(OH)D2 SERPL-MCNC: 46 NG/ML (ref 30–96)
ALBUMIN SERPL BCP-MCNC: 4.2 G/DL (ref 3.5–5.2)
ALP SERPL-CCNC: 58 U/L (ref 55–135)
ALT SERPL W/O P-5'-P-CCNC: 11 U/L (ref 10–44)
ANION GAP SERPL CALC-SCNC: 7 MMOL/L (ref 8–16)
AST SERPL-CCNC: 16 U/L (ref 10–40)
BILIRUB SERPL-MCNC: 0.7 MG/DL (ref 0.1–1)
BUN SERPL-MCNC: 13 MG/DL (ref 6–20)
CALCIUM SERPL-MCNC: 9.6 MG/DL (ref 8.7–10.5)
CHLORIDE SERPL-SCNC: 103 MMOL/L (ref 95–110)
CHOLEST SERPL-MCNC: 169 MG/DL (ref 120–199)
CHOLEST/HDLC SERPL: 3 {RATIO} (ref 2–5)
CO2 SERPL-SCNC: 31 MMOL/L (ref 23–29)
CREAT SERPL-MCNC: 0.6 MG/DL (ref 0.5–1.4)
EST. GFR  (AFRICAN AMERICAN): >60 ML/MIN/1.73 M^2
EST. GFR  (NON AFRICAN AMERICAN): >60 ML/MIN/1.73 M^2
ESTIMATED AVG GLUCOSE: 114 MG/DL (ref 68–131)
GLUCOSE SERPL-MCNC: 93 MG/DL (ref 70–110)
HBA1C MFR BLD HPLC: 5.6 % (ref 4–5.6)
HDLC SERPL-MCNC: 56 MG/DL (ref 40–75)
HDLC SERPL: 33.1 % (ref 20–50)
LDLC SERPL CALC-MCNC: 91.6 MG/DL (ref 63–159)
NONHDLC SERPL-MCNC: 113 MG/DL
POTASSIUM SERPL-SCNC: 4.4 MMOL/L (ref 3.5–5.1)
PROT SERPL-MCNC: 7.3 G/DL (ref 6–8.4)
SODIUM SERPL-SCNC: 141 MMOL/L (ref 136–145)
TRIGL SERPL-MCNC: 107 MG/DL (ref 30–150)
TSH SERPL DL<=0.005 MIU/L-ACNC: 1.48 UIU/ML (ref 0.4–4)

## 2021-01-25 ENCOUNTER — OFFICE VISIT (OUTPATIENT)
Dept: OPHTHALMOLOGY | Facility: CLINIC | Age: 61
End: 2021-01-25
Payer: COMMERCIAL

## 2021-01-25 ENCOUNTER — OFFICE VISIT (OUTPATIENT)
Dept: INTERNAL MEDICINE | Facility: CLINIC | Age: 61
End: 2021-01-25
Payer: COMMERCIAL

## 2021-01-25 ENCOUNTER — LAB VISIT (OUTPATIENT)
Dept: LAB | Facility: HOSPITAL | Age: 61
End: 2021-01-25
Attending: FAMILY MEDICINE
Payer: COMMERCIAL

## 2021-01-25 VITALS
BODY MASS INDEX: 29.96 KG/M2 | HEIGHT: 64 IN | SYSTOLIC BLOOD PRESSURE: 118 MMHG | TEMPERATURE: 98 F | HEART RATE: 72 BPM | DIASTOLIC BLOOD PRESSURE: 70 MMHG | WEIGHT: 175.5 LBS

## 2021-01-25 DIAGNOSIS — Z01.00 ROUTINE EYE EXAM: ICD-10-CM

## 2021-01-25 DIAGNOSIS — G89.29 CHRONIC PAIN OF BOTH KNEES: ICD-10-CM

## 2021-01-25 DIAGNOSIS — Z00.00 ROUTINE GENERAL MEDICAL EXAMINATION AT A HEALTH CARE FACILITY: Primary | ICD-10-CM

## 2021-01-25 DIAGNOSIS — E11.9 TYPE 2 DIABETES MELLITUS WITHOUT COMPLICATION: ICD-10-CM

## 2021-01-25 DIAGNOSIS — M25.562 CHRONIC PAIN OF BOTH KNEES: ICD-10-CM

## 2021-01-25 DIAGNOSIS — H04.123 DRY EYES, BILATERAL: Primary | ICD-10-CM

## 2021-01-25 DIAGNOSIS — R73.09 ABNORMAL GLUCOSE: ICD-10-CM

## 2021-01-25 DIAGNOSIS — M25.561 CHRONIC PAIN OF BOTH KNEES: ICD-10-CM

## 2021-01-25 DIAGNOSIS — H52.7 REFRACTIVE DISORDER: ICD-10-CM

## 2021-01-25 PROCEDURE — 2023F PR DILATED RETINAL EXAM W/O EVID OF RETINOPATHY: ICD-10-PCS | Mod: S$GLB,,, | Performed by: STUDENT IN AN ORGANIZED HEALTH CARE EDUCATION/TRAINING PROGRAM

## 2021-01-25 PROCEDURE — 92015 PR REFRACTION: ICD-10-PCS | Mod: S$GLB,,, | Performed by: STUDENT IN AN ORGANIZED HEALTH CARE EDUCATION/TRAINING PROGRAM

## 2021-01-25 PROCEDURE — 82570 ASSAY OF URINE CREATININE: CPT

## 2021-01-25 PROCEDURE — 1126F PR PAIN SEVERITY QUANTIFIED, NO PAIN PRESENT: ICD-10-PCS | Mod: S$GLB,,, | Performed by: FAMILY MEDICINE

## 2021-01-25 PROCEDURE — 3008F BODY MASS INDEX DOCD: CPT | Mod: CPTII,S$GLB,, | Performed by: FAMILY MEDICINE

## 2021-01-25 PROCEDURE — 90750 HZV VACC RECOMBINANT IM: CPT | Mod: S$GLB,,, | Performed by: FAMILY MEDICINE

## 2021-01-25 PROCEDURE — 3008F PR BODY MASS INDEX (BMI) DOCUMENTED: ICD-10-PCS | Mod: CPTII,S$GLB,, | Performed by: FAMILY MEDICINE

## 2021-01-25 PROCEDURE — 99999 PR PBB SHADOW E&M-EST. PATIENT-LVL III: CPT | Mod: PBBFAC,,, | Performed by: STUDENT IN AN ORGANIZED HEALTH CARE EDUCATION/TRAINING PROGRAM

## 2021-01-25 PROCEDURE — 1126F AMNT PAIN NOTED NONE PRSNT: CPT | Mod: S$GLB,,, | Performed by: FAMILY MEDICINE

## 2021-01-25 PROCEDURE — 92015 DETERMINE REFRACTIVE STATE: CPT | Mod: S$GLB,,, | Performed by: STUDENT IN AN ORGANIZED HEALTH CARE EDUCATION/TRAINING PROGRAM

## 2021-01-25 PROCEDURE — 99396 PREV VISIT EST AGE 40-64: CPT | Mod: 25,S$GLB,, | Performed by: FAMILY MEDICINE

## 2021-01-25 PROCEDURE — 92004 PR EYE EXAM, NEW PATIENT,COMPREHESV: ICD-10-PCS | Mod: S$GLB,,, | Performed by: STUDENT IN AN ORGANIZED HEALTH CARE EDUCATION/TRAINING PROGRAM

## 2021-01-25 PROCEDURE — 90471 ZOSTER RECOMBINANT VACCINE: ICD-10-PCS | Mod: S$GLB,,, | Performed by: FAMILY MEDICINE

## 2021-01-25 PROCEDURE — 99396 PR PREVENTIVE VISIT,EST,40-64: ICD-10-PCS | Mod: 25,S$GLB,, | Performed by: FAMILY MEDICINE

## 2021-01-25 PROCEDURE — 99999 PR PBB SHADOW E&M-EST. PATIENT-LVL III: ICD-10-PCS | Mod: PBBFAC,,, | Performed by: STUDENT IN AN ORGANIZED HEALTH CARE EDUCATION/TRAINING PROGRAM

## 2021-01-25 PROCEDURE — 99999 PR PBB SHADOW E&M-EST. PATIENT-LVL III: ICD-10-PCS | Mod: PBBFAC,,, | Performed by: FAMILY MEDICINE

## 2021-01-25 PROCEDURE — 82043 UR ALBUMIN QUANTITATIVE: CPT

## 2021-01-25 PROCEDURE — 90750 ZOSTER RECOMBINANT VACCINE: ICD-10-PCS | Mod: S$GLB,,, | Performed by: FAMILY MEDICINE

## 2021-01-25 PROCEDURE — 92004 COMPRE OPH EXAM NEW PT 1/>: CPT | Mod: S$GLB,,, | Performed by: STUDENT IN AN ORGANIZED HEALTH CARE EDUCATION/TRAINING PROGRAM

## 2021-01-25 PROCEDURE — 2023F DILAT RTA XM W/O RTNOPTHY: CPT | Mod: S$GLB,,, | Performed by: STUDENT IN AN ORGANIZED HEALTH CARE EDUCATION/TRAINING PROGRAM

## 2021-01-25 PROCEDURE — 90471 IMMUNIZATION ADMIN: CPT | Mod: S$GLB,,, | Performed by: FAMILY MEDICINE

## 2021-01-25 PROCEDURE — 99999 PR PBB SHADOW E&M-EST. PATIENT-LVL III: CPT | Mod: PBBFAC,,, | Performed by: FAMILY MEDICINE

## 2021-01-25 RX ORDER — ATORVASTATIN CALCIUM 20 MG/1
20 TABLET, FILM COATED ORAL DAILY
Qty: 90 TABLET | Refills: 3 | Status: SHIPPED | OUTPATIENT
Start: 2021-01-25 | End: 2022-03-21

## 2021-01-25 RX ORDER — METFORMIN HYDROCHLORIDE 500 MG/1
500 TABLET ORAL 2 TIMES DAILY WITH MEALS
Qty: 180 TABLET | Refills: 3 | Status: SHIPPED | OUTPATIENT
Start: 2021-01-25 | End: 2021-08-02

## 2021-01-25 RX ORDER — MELOXICAM 15 MG/1
15 TABLET ORAL DAILY PRN
Qty: 30 TABLET | Refills: 3 | Status: SHIPPED | OUTPATIENT
Start: 2021-01-25 | End: 2024-02-19 | Stop reason: SDUPTHER

## 2021-01-26 LAB
ALBUMIN/CREAT UR: NORMAL UG/MG (ref 0–30)
CREAT UR-MCNC: 19 MG/DL (ref 15–325)
MICROALBUMIN UR DL<=1MG/L-MCNC: <2.5 UG/ML

## 2021-04-27 ENCOUNTER — TELEPHONE (OUTPATIENT)
Dept: ORTHOPEDICS | Facility: CLINIC | Age: 61
End: 2021-04-27

## 2021-04-29 ENCOUNTER — PATIENT MESSAGE (OUTPATIENT)
Dept: RESEARCH | Facility: HOSPITAL | Age: 61
End: 2021-04-29

## 2021-06-02 ENCOUNTER — OFFICE VISIT (OUTPATIENT)
Dept: ORTHOPEDICS | Facility: CLINIC | Age: 61
End: 2021-06-02
Payer: COMMERCIAL

## 2021-06-02 ENCOUNTER — TELEPHONE (OUTPATIENT)
Dept: PAIN MEDICINE | Facility: CLINIC | Age: 61
End: 2021-06-02

## 2021-06-02 VITALS
BODY MASS INDEX: 29.88 KG/M2 | DIASTOLIC BLOOD PRESSURE: 82 MMHG | SYSTOLIC BLOOD PRESSURE: 135 MMHG | WEIGHT: 175 LBS | HEART RATE: 74 BPM | HEIGHT: 64 IN

## 2021-06-02 DIAGNOSIS — M17.12 ARTHRITIS OF KNEE, LEFT: Primary | ICD-10-CM

## 2021-06-02 DIAGNOSIS — M17.12 PRIMARY OSTEOARTHRITIS OF LEFT KNEE: ICD-10-CM

## 2021-06-02 DIAGNOSIS — M21.162 GENU VARUM OF LEFT LOWER EXTREMITY: ICD-10-CM

## 2021-06-02 PROCEDURE — 1125F PR PAIN SEVERITY QUANTIFIED, PAIN PRESENT: ICD-10-PCS | Mod: S$GLB,,, | Performed by: PHYSICIAN ASSISTANT

## 2021-06-02 PROCEDURE — 3008F PR BODY MASS INDEX (BMI) DOCUMENTED: ICD-10-PCS | Mod: CPTII,S$GLB,, | Performed by: PHYSICIAN ASSISTANT

## 2021-06-02 PROCEDURE — 99499 NO LOS: ICD-10-PCS | Mod: S$GLB,,, | Performed by: PHYSICIAN ASSISTANT

## 2021-06-02 PROCEDURE — 3008F BODY MASS INDEX DOCD: CPT | Mod: CPTII,S$GLB,, | Performed by: PHYSICIAN ASSISTANT

## 2021-06-02 PROCEDURE — 99999 PR PBB SHADOW E&M-EST. PATIENT-LVL III: CPT | Mod: PBBFAC,,, | Performed by: PHYSICIAN ASSISTANT

## 2021-06-02 PROCEDURE — 20610 LARGE JOINT ASPIRATION/INJECTION: L KNEE: ICD-10-PCS | Mod: LT,S$GLB,, | Performed by: PHYSICIAN ASSISTANT

## 2021-06-02 PROCEDURE — 1125F AMNT PAIN NOTED PAIN PRSNT: CPT | Mod: S$GLB,,, | Performed by: PHYSICIAN ASSISTANT

## 2021-06-02 PROCEDURE — 99999 PR PBB SHADOW E&M-EST. PATIENT-LVL III: ICD-10-PCS | Mod: PBBFAC,,, | Performed by: PHYSICIAN ASSISTANT

## 2021-06-02 PROCEDURE — 20610 DRAIN/INJ JOINT/BURSA W/O US: CPT | Mod: LT,S$GLB,, | Performed by: PHYSICIAN ASSISTANT

## 2021-06-02 PROCEDURE — 99499 UNLISTED E&M SERVICE: CPT | Mod: S$GLB,,, | Performed by: PHYSICIAN ASSISTANT

## 2021-06-02 RX ORDER — NAPROXEN 500 MG/1
500 TABLET ORAL 2 TIMES DAILY
Qty: 60 TABLET | Refills: 0 | Status: SHIPPED | OUTPATIENT
Start: 2021-06-02 | End: 2021-08-02 | Stop reason: ALTCHOICE

## 2021-06-02 RX ORDER — METHYLPREDNISOLONE ACETATE 80 MG/ML
80 INJECTION, SUSPENSION INTRA-ARTICULAR; INTRALESIONAL; INTRAMUSCULAR; SOFT TISSUE
Status: DISCONTINUED | OUTPATIENT
Start: 2021-06-02 | End: 2021-06-02 | Stop reason: HOSPADM

## 2021-06-02 RX ADMIN — METHYLPREDNISOLONE ACETATE 80 MG: 80 INJECTION, SUSPENSION INTRA-ARTICULAR; INTRALESIONAL; INTRAMUSCULAR; SOFT TISSUE at 08:06

## 2021-07-21 DIAGNOSIS — Z12.31 OTHER SCREENING MAMMOGRAM: ICD-10-CM

## 2021-07-26 ENCOUNTER — LAB VISIT (OUTPATIENT)
Dept: LAB | Facility: HOSPITAL | Age: 61
End: 2021-07-26
Payer: COMMERCIAL

## 2021-07-26 DIAGNOSIS — R73.09 ABNORMAL GLUCOSE: ICD-10-CM

## 2021-07-26 LAB
ESTIMATED AVG GLUCOSE: 111 MG/DL (ref 68–131)
HBA1C MFR BLD: 5.5 % (ref 4–5.6)

## 2021-07-26 PROCEDURE — 36415 COLL VENOUS BLD VENIPUNCTURE: CPT | Mod: PO | Performed by: FAMILY MEDICINE

## 2021-07-26 PROCEDURE — 83036 HEMOGLOBIN GLYCOSYLATED A1C: CPT | Performed by: FAMILY MEDICINE

## 2021-08-02 ENCOUNTER — OFFICE VISIT (OUTPATIENT)
Dept: INTERNAL MEDICINE | Facility: CLINIC | Age: 61
End: 2021-08-02
Payer: COMMERCIAL

## 2021-08-02 VITALS
DIASTOLIC BLOOD PRESSURE: 68 MMHG | BODY MASS INDEX: 29.43 KG/M2 | TEMPERATURE: 97 F | SYSTOLIC BLOOD PRESSURE: 112 MMHG | HEART RATE: 66 BPM | WEIGHT: 172.38 LBS | HEIGHT: 64 IN

## 2021-08-02 DIAGNOSIS — Z00.00 ANNUAL PHYSICAL EXAM: ICD-10-CM

## 2021-08-02 DIAGNOSIS — R73.09 ABNORMAL GLUCOSE: Primary | ICD-10-CM

## 2021-08-02 PROCEDURE — 1126F PR PAIN SEVERITY QUANTIFIED, NO PAIN PRESENT: ICD-10-PCS | Mod: CPTII,S$GLB,, | Performed by: FAMILY MEDICINE

## 2021-08-02 PROCEDURE — 3078F DIAST BP <80 MM HG: CPT | Mod: CPTII,S$GLB,, | Performed by: FAMILY MEDICINE

## 2021-08-02 PROCEDURE — 3008F BODY MASS INDEX DOCD: CPT | Mod: CPTII,S$GLB,, | Performed by: FAMILY MEDICINE

## 2021-08-02 PROCEDURE — 3044F HG A1C LEVEL LT 7.0%: CPT | Mod: CPTII,S$GLB,, | Performed by: FAMILY MEDICINE

## 2021-08-02 PROCEDURE — 3044F PR MOST RECENT HEMOGLOBIN A1C LEVEL <7.0%: ICD-10-PCS | Mod: CPTII,S$GLB,, | Performed by: FAMILY MEDICINE

## 2021-08-02 PROCEDURE — 99214 PR OFFICE/OUTPT VISIT, EST, LEVL IV, 30-39 MIN: ICD-10-PCS | Mod: S$GLB,,, | Performed by: FAMILY MEDICINE

## 2021-08-02 PROCEDURE — 1159F PR MEDICATION LIST DOCUMENTED IN MEDICAL RECORD: ICD-10-PCS | Mod: CPTII,S$GLB,, | Performed by: FAMILY MEDICINE

## 2021-08-02 PROCEDURE — 1159F MED LIST DOCD IN RCRD: CPT | Mod: CPTII,S$GLB,, | Performed by: FAMILY MEDICINE

## 2021-08-02 PROCEDURE — 99214 OFFICE O/P EST MOD 30 MIN: CPT | Mod: S$GLB,,, | Performed by: FAMILY MEDICINE

## 2021-08-02 PROCEDURE — 3074F SYST BP LT 130 MM HG: CPT | Mod: CPTII,S$GLB,, | Performed by: FAMILY MEDICINE

## 2021-08-02 PROCEDURE — 3008F PR BODY MASS INDEX (BMI) DOCUMENTED: ICD-10-PCS | Mod: CPTII,S$GLB,, | Performed by: FAMILY MEDICINE

## 2021-08-02 PROCEDURE — 1160F PR REVIEW ALL MEDS BY PRESCRIBER/CLIN PHARMACIST DOCUMENTED: ICD-10-PCS | Mod: CPTII,S$GLB,, | Performed by: FAMILY MEDICINE

## 2021-08-02 PROCEDURE — 1126F AMNT PAIN NOTED NONE PRSNT: CPT | Mod: CPTII,S$GLB,, | Performed by: FAMILY MEDICINE

## 2021-08-02 PROCEDURE — 3078F PR MOST RECENT DIASTOLIC BLOOD PRESSURE < 80 MM HG: ICD-10-PCS | Mod: CPTII,S$GLB,, | Performed by: FAMILY MEDICINE

## 2021-08-02 PROCEDURE — 99999 PR PBB SHADOW E&M-EST. PATIENT-LVL III: CPT | Mod: PBBFAC,,, | Performed by: FAMILY MEDICINE

## 2021-08-02 PROCEDURE — 3074F PR MOST RECENT SYSTOLIC BLOOD PRESSURE < 130 MM HG: ICD-10-PCS | Mod: CPTII,S$GLB,, | Performed by: FAMILY MEDICINE

## 2021-08-02 PROCEDURE — 1160F RVW MEDS BY RX/DR IN RCRD: CPT | Mod: CPTII,S$GLB,, | Performed by: FAMILY MEDICINE

## 2021-08-02 PROCEDURE — 99999 PR PBB SHADOW E&M-EST. PATIENT-LVL III: ICD-10-PCS | Mod: PBBFAC,,, | Performed by: FAMILY MEDICINE

## 2021-08-02 RX ORDER — METFORMIN HYDROCHLORIDE 500 MG/1
500 TABLET ORAL
Qty: 180 TABLET | Refills: 3
Start: 2021-08-02 | End: 2022-05-03

## 2021-08-02 RX ORDER — SEMAGLUTIDE 1.34 MG/ML
0.5 INJECTION, SOLUTION SUBCUTANEOUS
Qty: 2 PEN | Refills: 6 | Status: SHIPPED | OUTPATIENT
Start: 2021-08-02 | End: 2022-01-10

## 2021-08-11 ENCOUNTER — HOSPITAL ENCOUNTER (OUTPATIENT)
Dept: RADIOLOGY | Facility: HOSPITAL | Age: 61
Discharge: HOME | End: 2021-08-11
Attending: FAMILY MEDICINE
Payer: COMMERCIAL

## 2021-08-11 VITALS — HEIGHT: 64 IN | WEIGHT: 172.38 LBS | BODY MASS INDEX: 29.43 KG/M2

## 2021-08-11 DIAGNOSIS — Z12.31 OTHER SCREENING MAMMOGRAM: ICD-10-CM

## 2021-08-11 PROCEDURE — 77067 SCR MAMMO BI INCL CAD: CPT | Mod: TC

## 2021-08-11 PROCEDURE — 77067 SCR MAMMO BI INCL CAD: CPT | Mod: 26,,, | Performed by: RADIOLOGY

## 2021-08-11 PROCEDURE — 77067 MAMMO DIGITAL SCREENING BILAT WITH TOMO: ICD-10-PCS | Mod: 26,,, | Performed by: RADIOLOGY

## 2021-08-11 PROCEDURE — 77063 MAMMO DIGITAL SCREENING BILAT WITH TOMO: ICD-10-PCS | Mod: 26,,, | Performed by: RADIOLOGY

## 2021-08-11 PROCEDURE — 77063 BREAST TOMOSYNTHESIS BI: CPT | Mod: 26,,, | Performed by: RADIOLOGY

## 2021-08-25 ENCOUNTER — PATIENT MESSAGE (OUTPATIENT)
Dept: INTERNAL MEDICINE | Facility: CLINIC | Age: 61
End: 2021-08-25

## 2021-09-08 ENCOUNTER — CLINICAL SUPPORT (OUTPATIENT)
Dept: INTERNAL MEDICINE | Facility: CLINIC | Age: 61
End: 2021-09-08
Payer: COMMERCIAL

## 2021-09-08 DIAGNOSIS — Z23 NEED FOR SHINGLES VACCINE: Primary | ICD-10-CM

## 2021-09-08 PROCEDURE — 90471 IMMUNIZATION ADMIN: CPT | Mod: S$GLB,,, | Performed by: FAMILY MEDICINE

## 2021-09-08 PROCEDURE — 90471 ZOSTER RECOMBINANT VACCINE: ICD-10-PCS | Mod: S$GLB,,, | Performed by: FAMILY MEDICINE

## 2021-09-08 PROCEDURE — 99999 PR PBB SHADOW E&M-EST. PATIENT-LVL I: CPT | Mod: PBBFAC,,,

## 2021-09-08 PROCEDURE — 90750 HZV VACC RECOMBINANT IM: CPT | Mod: S$GLB,,, | Performed by: FAMILY MEDICINE

## 2021-09-08 PROCEDURE — 90750 ZOSTER RECOMBINANT VACCINE: ICD-10-PCS | Mod: S$GLB,,, | Performed by: FAMILY MEDICINE

## 2021-09-08 PROCEDURE — 99999 PR PBB SHADOW E&M-EST. PATIENT-LVL I: ICD-10-PCS | Mod: PBBFAC,,,

## 2021-09-19 ENCOUNTER — IMMUNIZATION (OUTPATIENT)
Dept: PRIMARY CARE CLINIC | Facility: CLINIC | Age: 61
End: 2021-09-19
Payer: COMMERCIAL

## 2021-09-19 DIAGNOSIS — Z23 NEED FOR VACCINATION: Primary | ICD-10-CM

## 2021-09-19 PROCEDURE — 0012A COVID-19, MRNA, LNP-S, PF, 100 MCG/0.5 ML DOSE VACCINE: CPT | Mod: CV19,PBBFAC | Performed by: FAMILY MEDICINE

## 2021-10-12 NOTE — PATIENT INSTRUCTIONS
Vitamin D3 1000-2000IU daily   Ear Wedge Repair Text: A wedge excision was completed by carrying down an excision through the full thickness of the ear and cartilage with an inward facing Burow's triangle. The wound was then closed in a layered fashion.

## 2021-12-27 ENCOUNTER — PATIENT MESSAGE (OUTPATIENT)
Dept: INTERNAL MEDICINE | Facility: CLINIC | Age: 61
End: 2021-12-27
Payer: COMMERCIAL

## 2022-01-03 ENCOUNTER — LAB VISIT (OUTPATIENT)
Dept: LAB | Facility: HOSPITAL | Age: 62
End: 2022-01-03
Attending: FAMILY MEDICINE
Payer: COMMERCIAL

## 2022-01-03 DIAGNOSIS — Z00.00 ANNUAL PHYSICAL EXAM: ICD-10-CM

## 2022-01-03 LAB
25(OH)D3+25(OH)D2 SERPL-MCNC: 57 NG/ML (ref 30–96)
ALBUMIN SERPL BCP-MCNC: 4.1 G/DL (ref 3.5–5.2)
ALP SERPL-CCNC: 56 U/L (ref 55–135)
ALT SERPL W/O P-5'-P-CCNC: 11 U/L (ref 10–44)
ANION GAP SERPL CALC-SCNC: 8 MMOL/L (ref 8–16)
AST SERPL-CCNC: 14 U/L (ref 10–40)
BASOPHILS # BLD AUTO: 0.03 K/UL (ref 0–0.2)
BASOPHILS NFR BLD: 0.7 % (ref 0–1.9)
BILIRUB SERPL-MCNC: 0.9 MG/DL (ref 0.1–1)
BUN SERPL-MCNC: 12 MG/DL (ref 6–20)
CALCIUM SERPL-MCNC: 9.7 MG/DL (ref 8.7–10.5)
CHLORIDE SERPL-SCNC: 103 MMOL/L (ref 95–110)
CHOLEST SERPL-MCNC: 165 MG/DL (ref 120–199)
CHOLEST/HDLC SERPL: 3 {RATIO} (ref 2–5)
CO2 SERPL-SCNC: 30 MMOL/L (ref 23–29)
CREAT SERPL-MCNC: 0.6 MG/DL (ref 0.5–1.4)
DIFFERENTIAL METHOD: ABNORMAL
EOSINOPHIL # BLD AUTO: 0.1 K/UL (ref 0–0.5)
EOSINOPHIL NFR BLD: 2 % (ref 0–8)
ERYTHROCYTE [DISTWIDTH] IN BLOOD BY AUTOMATED COUNT: 13.4 % (ref 11.5–14.5)
EST. GFR  (AFRICAN AMERICAN): >60 ML/MIN/1.73 M^2
EST. GFR  (NON AFRICAN AMERICAN): >60 ML/MIN/1.73 M^2
ESTIMATED AVG GLUCOSE: 117 MG/DL (ref 68–131)
GLUCOSE SERPL-MCNC: 85 MG/DL (ref 70–110)
HBA1C MFR BLD: 5.7 % (ref 4–5.6)
HCT VFR BLD AUTO: 34.7 % (ref 37–48.5)
HDLC SERPL-MCNC: 55 MG/DL (ref 40–75)
HDLC SERPL: 33.3 % (ref 20–50)
HGB BLD-MCNC: 11.2 G/DL (ref 12–16)
IMM GRANULOCYTES # BLD AUTO: 0.01 K/UL (ref 0–0.04)
IMM GRANULOCYTES NFR BLD AUTO: 0.2 % (ref 0–0.5)
LDLC SERPL CALC-MCNC: 88.6 MG/DL (ref 63–159)
LYMPHOCYTES # BLD AUTO: 1.3 K/UL (ref 1–4.8)
LYMPHOCYTES NFR BLD: 28.4 % (ref 18–48)
MCH RBC QN AUTO: 29.8 PG (ref 27–31)
MCHC RBC AUTO-ENTMCNC: 32.3 G/DL (ref 32–36)
MCV RBC AUTO: 92 FL (ref 82–98)
MONOCYTES # BLD AUTO: 0.4 K/UL (ref 0.3–1)
MONOCYTES NFR BLD: 8.9 % (ref 4–15)
NEUTROPHILS # BLD AUTO: 2.7 K/UL (ref 1.8–7.7)
NEUTROPHILS NFR BLD: 59.8 % (ref 38–73)
NONHDLC SERPL-MCNC: 110 MG/DL
NRBC BLD-RTO: 0 /100 WBC
PLATELET # BLD AUTO: 231 K/UL (ref 150–450)
PMV BLD AUTO: 10.2 FL (ref 9.2–12.9)
POTASSIUM SERPL-SCNC: 4.3 MMOL/L (ref 3.5–5.1)
PROT SERPL-MCNC: 6.7 G/DL (ref 6–8.4)
RBC # BLD AUTO: 3.76 M/UL (ref 4–5.4)
SODIUM SERPL-SCNC: 141 MMOL/L (ref 136–145)
TRIGL SERPL-MCNC: 107 MG/DL (ref 30–150)
TSH SERPL DL<=0.005 MIU/L-ACNC: 1.48 UIU/ML (ref 0.4–4)
WBC # BLD AUTO: 4.51 K/UL (ref 3.9–12.7)

## 2022-01-03 PROCEDURE — 36415 COLL VENOUS BLD VENIPUNCTURE: CPT | Mod: PO | Performed by: FAMILY MEDICINE

## 2022-01-03 PROCEDURE — 82306 VITAMIN D 25 HYDROXY: CPT | Performed by: FAMILY MEDICINE

## 2022-01-03 PROCEDURE — 80053 COMPREHEN METABOLIC PANEL: CPT | Performed by: FAMILY MEDICINE

## 2022-01-03 PROCEDURE — 85025 COMPLETE CBC W/AUTO DIFF WBC: CPT | Performed by: FAMILY MEDICINE

## 2022-01-03 PROCEDURE — 84443 ASSAY THYROID STIM HORMONE: CPT | Performed by: FAMILY MEDICINE

## 2022-01-03 PROCEDURE — 87389 HIV-1 AG W/HIV-1&-2 AB AG IA: CPT | Performed by: FAMILY MEDICINE

## 2022-01-03 PROCEDURE — 83036 HEMOGLOBIN GLYCOSYLATED A1C: CPT | Performed by: FAMILY MEDICINE

## 2022-01-03 PROCEDURE — 80061 LIPID PANEL: CPT | Performed by: FAMILY MEDICINE

## 2022-01-04 LAB — HIV 1+2 AB+HIV1 P24 AG SERPL QL IA: NEGATIVE

## 2022-01-10 ENCOUNTER — OFFICE VISIT (OUTPATIENT)
Dept: INTERNAL MEDICINE | Facility: CLINIC | Age: 62
End: 2022-01-10
Payer: COMMERCIAL

## 2022-01-10 ENCOUNTER — LAB VISIT (OUTPATIENT)
Dept: LAB | Facility: HOSPITAL | Age: 62
End: 2022-01-10
Attending: FAMILY MEDICINE
Payer: COMMERCIAL

## 2022-01-10 VITALS
BODY MASS INDEX: 28.76 KG/M2 | TEMPERATURE: 98 F | WEIGHT: 168.44 LBS | HEART RATE: 72 BPM | SYSTOLIC BLOOD PRESSURE: 120 MMHG | DIASTOLIC BLOOD PRESSURE: 70 MMHG | HEIGHT: 64 IN

## 2022-01-10 DIAGNOSIS — Z00.00 ANNUAL PHYSICAL EXAM: Primary | ICD-10-CM

## 2022-01-10 DIAGNOSIS — R73.09 ABNORMAL GLUCOSE: ICD-10-CM

## 2022-01-10 DIAGNOSIS — D50.9 IRON DEFICIENCY ANEMIA, UNSPECIFIED IRON DEFICIENCY ANEMIA TYPE: ICD-10-CM

## 2022-01-10 DIAGNOSIS — Z01.419 WELL WOMAN EXAM: ICD-10-CM

## 2022-01-10 DIAGNOSIS — Z12.11 COLON CANCER SCREENING: ICD-10-CM

## 2022-01-10 LAB
FERRITIN SERPL-MCNC: 232 NG/ML (ref 20–300)
IRON SERPL-MCNC: 102 UG/DL (ref 30–160)
SATURATED IRON: 30 % (ref 20–50)
TOTAL IRON BINDING CAPACITY: 345 UG/DL (ref 250–450)
TRANSFERRIN SERPL-MCNC: 233 MG/DL (ref 200–375)

## 2022-01-10 PROCEDURE — 3044F PR MOST RECENT HEMOGLOBIN A1C LEVEL <7.0%: ICD-10-PCS | Mod: CPTII,S$GLB,, | Performed by: FAMILY MEDICINE

## 2022-01-10 PROCEDURE — 99999 PR PBB SHADOW E&M-EST. PATIENT-LVL IV: CPT | Mod: PBBFAC,,, | Performed by: FAMILY MEDICINE

## 2022-01-10 PROCEDURE — 99396 PR PREVENTIVE VISIT,EST,40-64: ICD-10-PCS | Mod: S$GLB,,, | Performed by: FAMILY MEDICINE

## 2022-01-10 PROCEDURE — 99999 PR PBB SHADOW E&M-EST. PATIENT-LVL IV: ICD-10-PCS | Mod: PBBFAC,,, | Performed by: FAMILY MEDICINE

## 2022-01-10 PROCEDURE — 82728 ASSAY OF FERRITIN: CPT | Performed by: FAMILY MEDICINE

## 2022-01-10 PROCEDURE — 36415 COLL VENOUS BLD VENIPUNCTURE: CPT | Mod: PO | Performed by: FAMILY MEDICINE

## 2022-01-10 PROCEDURE — 1160F RVW MEDS BY RX/DR IN RCRD: CPT | Mod: CPTII,S$GLB,, | Performed by: FAMILY MEDICINE

## 2022-01-10 PROCEDURE — 3008F BODY MASS INDEX DOCD: CPT | Mod: CPTII,S$GLB,, | Performed by: FAMILY MEDICINE

## 2022-01-10 PROCEDURE — 3074F SYST BP LT 130 MM HG: CPT | Mod: CPTII,S$GLB,, | Performed by: FAMILY MEDICINE

## 2022-01-10 PROCEDURE — 3078F PR MOST RECENT DIASTOLIC BLOOD PRESSURE < 80 MM HG: ICD-10-PCS | Mod: CPTII,S$GLB,, | Performed by: FAMILY MEDICINE

## 2022-01-10 PROCEDURE — 3008F PR BODY MASS INDEX (BMI) DOCUMENTED: ICD-10-PCS | Mod: CPTII,S$GLB,, | Performed by: FAMILY MEDICINE

## 2022-01-10 PROCEDURE — 99396 PREV VISIT EST AGE 40-64: CPT | Mod: S$GLB,,, | Performed by: FAMILY MEDICINE

## 2022-01-10 PROCEDURE — 3044F HG A1C LEVEL LT 7.0%: CPT | Mod: CPTII,S$GLB,, | Performed by: FAMILY MEDICINE

## 2022-01-10 PROCEDURE — 1159F MED LIST DOCD IN RCRD: CPT | Mod: CPTII,S$GLB,, | Performed by: FAMILY MEDICINE

## 2022-01-10 PROCEDURE — 83540 ASSAY OF IRON: CPT | Performed by: FAMILY MEDICINE

## 2022-01-10 PROCEDURE — 1159F PR MEDICATION LIST DOCUMENTED IN MEDICAL RECORD: ICD-10-PCS | Mod: CPTII,S$GLB,, | Performed by: FAMILY MEDICINE

## 2022-01-10 PROCEDURE — 1160F PR REVIEW ALL MEDS BY PRESCRIBER/CLIN PHARMACIST DOCUMENTED: ICD-10-PCS | Mod: CPTII,S$GLB,, | Performed by: FAMILY MEDICINE

## 2022-01-10 PROCEDURE — 3074F PR MOST RECENT SYSTOLIC BLOOD PRESSURE < 130 MM HG: ICD-10-PCS | Mod: CPTII,S$GLB,, | Performed by: FAMILY MEDICINE

## 2022-01-10 PROCEDURE — 3078F DIAST BP <80 MM HG: CPT | Mod: CPTII,S$GLB,, | Performed by: FAMILY MEDICINE

## 2022-01-10 NOTE — PROGRESS NOTES
"Subjective:      Patient ID: Shirley Amaya is a 61 y.o. female.    Chief Complaint:  Annual    HPI 61 y.o.   female patient with a PMHx of hyperlipidemia, OA of knee and YUDITH on CPAP presents to clinic for follow up. The patient was last seen on 2021      Today she reports patient reports that she is feeling well.   No more ozempic after this month//insurance won't be covering.    Patient states that she has been taking medications as prescribed with no adverse effects or concerns .    She denies chest pain, SOB, and bowel changes.     Past Medical History:   Diagnosis Date    Abnormal glucose     Hyperlipidemia     OA (osteoarthritis) of knee     YUDITH on CPAP      Family History   Problem Relation Age of Onset    Diabetes Father     Lung cancer Sister     Stomach cancer Brother     Heart disease Neg Hx     Hypertension Neg Hx     Colon cancer Neg Hx     Breast cancer Neg Hx      Past Surgical History:   Procedure Laterality Date    CERVICAL CONIZATION   W/ LASER      COLONOSCOPY N/A 2016    Procedure: COLONOSCOPY;  Surgeon: Mary Victor MD;  Location: [facility];  Service: Endoscopy;  Laterality: N/A;     Social History     Tobacco Use    Smoking status: Former Smoker     Packs/day: 2.00     Years: 25.00     Pack years: 50.00     Quit date: 2000     Years since quittin.7    Smokeless tobacco: Never Used   Substance Use Topics    Alcohol use: No    Drug use: No       /70   Pulse 72   Temp 97.7 °F (36.5 °C) (Temporal)   Ht 5' 4" (1.626 m)   Wt 76.4 kg (168 lb 6.9 oz)   BMI 28.91 kg/m²     Review of Systems   Constitutional: Negative for activity change, appetite change, chills, diaphoresis, fatigue, fever and unexpected weight change.   HENT: Negative for hearing loss, rhinorrhea, tinnitus and trouble swallowing.    Eyes: Negative for discharge and visual disturbance.   Respiratory: Negative for cough, chest tightness, shortness of breath and " wheezing.    Cardiovascular: Negative for chest pain, palpitations and leg swelling.   Gastrointestinal: Negative for abdominal distention, abdominal pain, blood in stool, constipation, diarrhea and vomiting.   Endocrine: Negative for polydipsia and polyuria.   Genitourinary: Negative for difficulty urinating, dysuria, frequency, hematuria, menstrual problem and urgency.   Musculoskeletal: Negative for arthralgias, back pain, joint swelling and neck pain.   Skin: Negative for color change and rash.   Neurological: Negative for weakness and headaches.   Hematological: Negative for adenopathy.   Psychiatric/Behavioral: Negative for confusion, decreased concentration and dysphoric mood.       Objective:     Physical Exam  Vitals and nursing note reviewed.   Constitutional:       General: She is not in acute distress.  HENT:      Right Ear: External ear normal.      Left Ear: External ear normal.      Nose: Nose normal.   Eyes:      Conjunctiva/sclera: Conjunctivae normal.      Pupils: Pupils are equal, round, and reactive to light.   Neck:      Vascular: No carotid bruit.   Cardiovascular:      Rate and Rhythm: Normal rate and regular rhythm.      Heart sounds: Normal heart sounds.   Pulmonary:      Effort: Pulmonary effort is normal. No respiratory distress.      Breath sounds: Normal breath sounds. No wheezing or rales.   Abdominal:      General: Bowel sounds are normal. There is no distension.      Palpations: Abdomen is soft.      Tenderness: There is no abdominal tenderness. There is no guarding.   Musculoskeletal:      Cervical back: Normal range of motion and neck supple.      Right lower leg: No edema.      Left lower leg: No edema.   Skin:     General: Skin is warm and dry.      Findings: No rash.   Neurological:      Mental Status: She is alert and oriented to person, place, and time.   Psychiatric:         Behavior: Behavior normal.         Thought Content: Thought content normal.         Judgment: Judgment  normal.         Lab Results   Component Value Date    WBC 4.51 01/03/2022    HGB 11.2 (L) 01/03/2022    HCT 34.7 (L) 01/03/2022     01/03/2022    CHOL 165 01/03/2022    TRIG 107 01/03/2022    HDL 55 01/03/2022    ALT 11 01/03/2022    AST 14 01/03/2022     01/03/2022    K 4.3 01/03/2022     01/03/2022    CREATININE 0.6 01/03/2022    BUN 12 01/03/2022    CO2 30 (H) 01/03/2022    TSH 1.484 01/03/2022    HGBA1C 5.7 (H) 01/03/2022       Assessment:     1. Annual physical exam    2. Iron deficiency anemia, unspecified iron deficiency anemia type    3. Colon cancer screening    4. Well woman exam    5. Abnormal glucose         Plan:     Annual physical exam    Iron deficiency anemia, unspecified iron deficiency anemia type  -     Ferritin; Future; Expected date: 01/10/2022  -     Iron and TIBC; Future; Expected date: 01/10/2022    Colon cancer screening  -     Case Request Endoscopy: COLONOSCOPY    Well woman exam  -     Ambulatory referral/consult to Gynecology; Future; Expected date: 01/17/2022    Abnormal glucose  -     Hemoglobin A1C; Future; Expected date: 07/10/2022    CBC is down//check iron levels.  Monitor//more reason for Cscope to get updated.    Vitals reviewed and stable. BP within normal range at 120/70. HR within normal range at 72.     Labs reviewed and discussed with patient. A1C elevated and now within prediabetic range at 5.7. Patient will need to continue taking medications. We will consider increasing Metformin dosage once patient is done with Ozempic. Recommended dietary changes as well.     Patient due for gyn exam and colonoscopy.     Questions and concerns addressed.       Fu 6 mos      Documentation entered by Leatha Samson, acting as scribe for Dr. Milton Disla. 01/10/2022 7:09 AM.

## 2022-01-11 ENCOUNTER — PATIENT MESSAGE (OUTPATIENT)
Dept: ENDOSCOPY | Facility: HOSPITAL | Age: 62
End: 2022-01-11
Payer: COMMERCIAL

## 2022-01-12 ENCOUNTER — PATIENT MESSAGE (OUTPATIENT)
Dept: INTERNAL MEDICINE | Facility: CLINIC | Age: 62
End: 2022-01-12
Payer: COMMERCIAL

## 2022-01-12 ENCOUNTER — PATIENT MESSAGE (OUTPATIENT)
Dept: ENDOSCOPY | Facility: HOSPITAL | Age: 62
End: 2022-01-12
Payer: COMMERCIAL

## 2022-01-12 RX ORDER — SODIUM, POTASSIUM,MAG SULFATES 17.5-3.13G
1 SOLUTION, RECONSTITUTED, ORAL ORAL DAILY
Qty: 1 KIT | Refills: 0 | Status: SHIPPED | OUTPATIENT
Start: 2022-01-12 | End: 2022-01-14

## 2022-01-19 ENCOUNTER — OFFICE VISIT (OUTPATIENT)
Dept: OBSTETRICS AND GYNECOLOGY | Facility: CLINIC | Age: 62
End: 2022-01-19
Payer: COMMERCIAL

## 2022-01-19 VITALS
BODY MASS INDEX: 29.06 KG/M2 | HEIGHT: 64 IN | SYSTOLIC BLOOD PRESSURE: 118 MMHG | WEIGHT: 170.19 LBS | DIASTOLIC BLOOD PRESSURE: 70 MMHG

## 2022-01-19 DIAGNOSIS — N90.4 LICHEN SCLEROSUS OF VULVA: ICD-10-CM

## 2022-01-19 DIAGNOSIS — Z01.419 WELL WOMAN EXAM: Primary | ICD-10-CM

## 2022-01-19 DIAGNOSIS — M85.80 OSTEOPENIA, UNSPECIFIED LOCATION: ICD-10-CM

## 2022-01-19 PROBLEM — G47.33 OSA (OBSTRUCTIVE SLEEP APNEA): Status: RESOLVED | Noted: 2018-04-30 | Resolved: 2022-01-19

## 2022-01-19 PROCEDURE — 3074F PR MOST RECENT SYSTOLIC BLOOD PRESSURE < 130 MM HG: ICD-10-PCS | Mod: CPTII,S$GLB,, | Performed by: NURSE PRACTITIONER

## 2022-01-19 PROCEDURE — 99999 PR PBB SHADOW E&M-EST. PATIENT-LVL IV: CPT | Mod: PBBFAC,,, | Performed by: NURSE PRACTITIONER

## 2022-01-19 PROCEDURE — 99396 PREV VISIT EST AGE 40-64: CPT | Mod: S$GLB,,, | Performed by: NURSE PRACTITIONER

## 2022-01-19 PROCEDURE — 1159F MED LIST DOCD IN RCRD: CPT | Mod: CPTII,S$GLB,, | Performed by: NURSE PRACTITIONER

## 2022-01-19 PROCEDURE — 87624 HPV HI-RISK TYP POOLED RSLT: CPT | Performed by: NURSE PRACTITIONER

## 2022-01-19 PROCEDURE — 3078F DIAST BP <80 MM HG: CPT | Mod: CPTII,S$GLB,, | Performed by: NURSE PRACTITIONER

## 2022-01-19 PROCEDURE — 1160F PR REVIEW ALL MEDS BY PRESCRIBER/CLIN PHARMACIST DOCUMENTED: ICD-10-PCS | Mod: CPTII,S$GLB,, | Performed by: NURSE PRACTITIONER

## 2022-01-19 PROCEDURE — 3008F BODY MASS INDEX DOCD: CPT | Mod: CPTII,S$GLB,, | Performed by: NURSE PRACTITIONER

## 2022-01-19 PROCEDURE — 3078F PR MOST RECENT DIASTOLIC BLOOD PRESSURE < 80 MM HG: ICD-10-PCS | Mod: CPTII,S$GLB,, | Performed by: NURSE PRACTITIONER

## 2022-01-19 PROCEDURE — 99396 PR PREVENTIVE VISIT,EST,40-64: ICD-10-PCS | Mod: S$GLB,,, | Performed by: NURSE PRACTITIONER

## 2022-01-19 PROCEDURE — 88175 CYTOPATH C/V AUTO FLUID REDO: CPT | Performed by: NURSE PRACTITIONER

## 2022-01-19 PROCEDURE — 3074F SYST BP LT 130 MM HG: CPT | Mod: CPTII,S$GLB,, | Performed by: NURSE PRACTITIONER

## 2022-01-19 PROCEDURE — 99999 PR PBB SHADOW E&M-EST. PATIENT-LVL IV: ICD-10-PCS | Mod: PBBFAC,,, | Performed by: NURSE PRACTITIONER

## 2022-01-19 PROCEDURE — 3008F PR BODY MASS INDEX (BMI) DOCUMENTED: ICD-10-PCS | Mod: CPTII,S$GLB,, | Performed by: NURSE PRACTITIONER

## 2022-01-19 PROCEDURE — 3044F PR MOST RECENT HEMOGLOBIN A1C LEVEL <7.0%: ICD-10-PCS | Mod: CPTII,S$GLB,, | Performed by: NURSE PRACTITIONER

## 2022-01-19 PROCEDURE — 1159F PR MEDICATION LIST DOCUMENTED IN MEDICAL RECORD: ICD-10-PCS | Mod: CPTII,S$GLB,, | Performed by: NURSE PRACTITIONER

## 2022-01-19 PROCEDURE — 3044F HG A1C LEVEL LT 7.0%: CPT | Mod: CPTII,S$GLB,, | Performed by: NURSE PRACTITIONER

## 2022-01-19 PROCEDURE — 1160F RVW MEDS BY RX/DR IN RCRD: CPT | Mod: CPTII,S$GLB,, | Performed by: NURSE PRACTITIONER

## 2022-01-19 RX ORDER — CLOBETASOL PROPIONATE 0.5 MG/G
OINTMENT TOPICAL NIGHTLY PRN
Qty: 30 G | Refills: 3 | Status: SHIPPED | OUTPATIENT
Start: 2022-01-19 | End: 2024-02-19 | Stop reason: SDUPTHER

## 2022-01-19 NOTE — PROGRESS NOTES
Subjective:       Patient ID: Shirley Amaya is a 61 y.o. female.    Chief Complaint:  Well Woman      History of Present Illness  HPI  Annual Exam-Postmenopausal   presents for annual exam. The patient has no complaints today. The patient is sexually active; denies complaints. GYN screening history: last pap: approximate date 2018 and was normal and last mammogram: approximate date 2021 and was normal.  Hx of cryo before daughter who is now 25yo.  The patient has never taken hormone replacement therapy. Patient denies post-menopausal vaginal bleeding. The patient wears seatbelts: yes. The patient participates in regular exercise: no. Has the patient ever been transfused or tattooed?: no. The patient reports that there is not domestic violence in her life.    No incontinence, bowel issues; hot flashes used to be pretty often, but not lately.  Dx with Lichen Sclerosus; uses temovate PRN with relief; needs refill    Works at [employer] in [place]    GYN & OB History  No LMP recorded. Patient is postmenopausal.   Date of Last Pap: No result found    OB History    Para Term  AB Living   2 2 2     2   SAB IAB Ectopic Multiple Live Births           2      # Outcome Date GA Lbr Bryn/2nd Weight Sex Delivery Anes PTL Lv   2 Term     M Vag-Spont   MARIA LUZ   1 Term     F Vag-Spont   MARIA LUZ       Review of Systems  Review of Systems   Constitutional: Negative for activity change, appetite change, chills, fatigue and fever.   HENT: Negative for nasal congestion and mouth sores.    Respiratory: Negative for cough, shortness of breath and wheezing.    Cardiovascular: Negative for chest pain.   Gastrointestinal: Negative for abdominal pain, constipation, diarrhea, nausea and vomiting.   Endocrine: Negative for hair loss and hot flashes.   Genitourinary: Negative for bladder incontinence, decreased libido, dyspareunia, dysuria, frequency, genital sores, hot flashes, pelvic pain, urgency, vaginal  bleeding, vaginal discharge, vaginal pain, urinary incontinence, postcoital bleeding, postmenopausal bleeding, vaginal dryness and vaginal odor.   Musculoskeletal: Negative for back pain.   Integumentary:  Negative for breast mass, nipple discharge, breast skin changes and breast tenderness.   Neurological: Negative for headaches.   Hematological: Negative for adenopathy.   Psychiatric/Behavioral: Negative for depression. The patient is not nervous/anxious.    All other systems reviewed and are negative.  Breast: Negative for breast self exam, lump, mass, mastodynia, nipple discharge, skin changes and tenderness          Objective:      Physical Exam:   Constitutional: She is oriented to person, place, and time. She appears well-developed and well-nourished. No distress.    HENT:   Head: Normocephalic and atraumatic.   Nose: Nose normal.    Eyes: Pupils are equal, round, and reactive to light. Conjunctivae and EOM are normal. Right eye exhibits no discharge. Left eye exhibits no discharge.     Cardiovascular: Normal rate, regular rhythm and normal heart sounds.  Exam reveals no gallop, no friction rub, no clubbing, no cyanosis and no edema.    No murmur heard.   Pulmonary/Chest: Effort normal and breath sounds normal. No respiratory distress. She has no decreased breath sounds. She has no wheezes. She has no rhonchi. She has no rales. She exhibits no tenderness. Right breast exhibits no inverted nipple, no mass, no nipple discharge, no skin change, no tenderness, no bleeding and no swelling. Left breast exhibits no inverted nipple, no mass, no nipple discharge, no skin change, no tenderness, no bleeding and no swelling. Breasts are symmetrical.        Abdominal: Soft. Bowel sounds are normal. She exhibits no distension. There is no abdominal tenderness. There is no rebound and no guarding. Hernia confirmed negative in the right inguinal area and confirmed negative in the left inguinal area.     Genitourinary:     Inguinal canal, vagina, uterus, right adnexa and left adnexa normal.   Rectum:      No external hemorrhoid.            Pelvic exam was performed with patient supine.   The external female genitalia was normal.   No external genitalia lesions identified,Genitalia hair distrobution normal .   Labial bartholins normal.There is no rash, tenderness, lesion or injury on the right labia. There is no rash, tenderness, lesion or injury on the left labia. Cervix is normal. Right adnexum displays no mass, no tenderness and no fullness. Left adnexum displays no mass, no tenderness and no fullness. No erythema,  no vaginal discharge, tenderness or bleeding in the vagina.    No foreign body in the vagina.      No signs of injury in the vagina.   Vagina was moist.Vaginal atrophy noted. Cervix exhibits no motion tenderness, no lesion, no discharge, no friability, no lesion, no tenderness and no polyp. Pap test: collected.Uerus contour normal  Uterus is not enlarged and not tender. Uterus size: 6 cm.Normal urethral meatus.Urethral Meatus exhibits: urethral lesion and prolapsedUrethra findings: no urethral mass, no tenderness and no urethral scarringBladder findings: no bladder distention and no bladder tenderness          Musculoskeletal: Normal range of motion and moves all extremeties.      Lymphadenopathy: No inguinal adenopathy noted on the right or left side.    Neurological: She is alert and oriented to person, place, and time.    Skin: Skin is warm and dry. No rash noted. She is not diaphoretic. No cyanosis or erythema. No pallor. Nails show no clubbing.    Psychiatric: She has a normal mood and affect. Her speech is normal and behavior is normal. Judgment and thought content normal.             Assessment:        1. Well woman exam    2. Lichen sclerosus of vulva    3. Osteopenia, unspecified location               Plan:   dexa ordered  Ensure adequate calcium and vitamin D intake and daily weight bearing exercises.    PMB  discussed; if she experiences any vaginal bleeding she should come back in to be evaluated.    rx sent for temovate; instructed on use; no more than 2-3x/wk    Reviewed updated recommendations for pap smears (every 3 years) in low risk patients.  Recommend annual pelvic exams.  Reviewed recommendations for annual CBE.  Next pap due in 2025.   RTC 1 year or sooner prn.  To PCP for other health maintenance.  Continue MMGs yearly until 77yo; does not want to schedule for August yet.    Well woman exam  -     Ambulatory referral/consult to Gynecology  -     Liquid-Based Pap Smear, Screening  -     HPV High Risk Genotypes, PCR    Lichen sclerosus of vulva  -     clobetasol 0.05% (TEMOVATE) 0.05 % Oint; Apply topically nightly as needed (itching). Do not use for more than 2 weeks continously  Dispense: 30 g; Refill: 3    Osteopenia, unspecified location  -     DXA Bone Density Spine And Hip; Future; Expected date: 01/19/2022

## 2022-01-23 LAB
CLINICAL INFO: NORMAL
CYTO CVX: NORMAL
CYTOLOGIST CVX/VAG CYTO: NORMAL
CYTOLOGIST CVX/VAG CYTO: NORMAL
CYTOLOGY CMNT CVX/VAG CYTO-IMP: NORMAL
CYTOLOGY PAP THIN PREP EXPLANATION: NORMAL
DATE OF PREVIOUS PAP: NORMAL
DATE PREVIOUS BX: YES
GEN CATEG CVX/VAG CYTO-IMP: NORMAL
HPV I/H RISK 4 DNA CVX QL NAA+PROBE: NOT DETECTED
LMP START DATE: NORMAL
MICROORGANISM CVX/VAG CYTO: NORMAL
PATHOLOGIST CVX/VAG CYTO: NORMAL
SERVICE CMNT-IMP: NORMAL
SPECIMEN SOURCE CVX/VAG CYTO: NORMAL
STAT OF ADQ CVX/VAG CYTO-IMP: NORMAL

## 2022-02-08 ENCOUNTER — APPOINTMENT (OUTPATIENT)
Dept: RADIOLOGY | Facility: HOSPITAL | Age: 62
End: 2022-02-08
Attending: NURSE PRACTITIONER
Payer: COMMERCIAL

## 2022-02-08 DIAGNOSIS — M85.80 OSTEOPENIA, UNSPECIFIED LOCATION: ICD-10-CM

## 2022-02-08 PROCEDURE — 77080 DXA BONE DENSITY AXIAL: CPT | Mod: TC

## 2022-02-08 PROCEDURE — 77080 DXA BONE DENSITY AXIAL: CPT | Mod: 26,,, | Performed by: RADIOLOGY

## 2022-02-08 PROCEDURE — 77080 DEXA BONE DENSITY SPINE HIP: ICD-10-PCS | Mod: 26,,, | Performed by: RADIOLOGY

## 2022-02-23 DIAGNOSIS — Z01.818 PRE-OP TESTING: ICD-10-CM

## 2022-02-26 ENCOUNTER — LAB VISIT (OUTPATIENT)
Dept: PRIMARY CARE CLINIC | Facility: CLINIC | Age: 62
End: 2022-02-26
Payer: COMMERCIAL

## 2022-02-26 DIAGNOSIS — Z01.818 PRE-OP TESTING: ICD-10-CM

## 2022-02-26 LAB
SARS-COV-2 RNA RESP QL NAA+PROBE: NOT DETECTED
SARS-COV-2- CYCLE NUMBER: NORMAL

## 2022-02-26 PROCEDURE — U0003 INFECTIOUS AGENT DETECTION BY NUCLEIC ACID (DNA OR RNA); SEVERE ACUTE RESPIRATORY SYNDROME CORONAVIRUS 2 (SARS-COV-2) (CORONAVIRUS DISEASE [COVID-19]), AMPLIFIED PROBE TECHNIQUE, MAKING USE OF HIGH THROUGHPUT TECHNOLOGIES AS DESCRIBED BY CMS-2020-01-R: HCPCS | Performed by: INTERNAL MEDICINE

## 2022-02-26 PROCEDURE — U0005 INFEC AGEN DETEC AMPLI PROBE: HCPCS | Performed by: INTERNAL MEDICINE

## 2022-02-28 ENCOUNTER — ANESTHESIA (OUTPATIENT)
Dept: ENDOSCOPY | Facility: HOSPITAL | Age: 62
End: 2022-02-28
Payer: COMMERCIAL

## 2022-02-28 ENCOUNTER — ANESTHESIA EVENT (OUTPATIENT)
Dept: ENDOSCOPY | Facility: HOSPITAL | Age: 62
End: 2022-02-28
Payer: COMMERCIAL

## 2022-02-28 ENCOUNTER — HOSPITAL ENCOUNTER (OUTPATIENT)
Facility: HOSPITAL | Age: 62
Discharge: HOME | End: 2022-02-28
Attending: INTERNAL MEDICINE | Admitting: INTERNAL MEDICINE
Payer: COMMERCIAL

## 2022-02-28 DIAGNOSIS — K63.5 POLYP OF COLON: ICD-10-CM

## 2022-02-28 LAB — GLUCOSE SERPL-MCNC: 106 MG/DL (ref 70–110)

## 2022-02-28 PROCEDURE — 37000008 HC ANESTHESIA 1ST 15 MINUTES: Performed by: INTERNAL MEDICINE

## 2022-02-28 PROCEDURE — G0105 COLORECTAL SCRN; HI RISK IND: HCPCS | Performed by: INTERNAL MEDICINE

## 2022-02-28 PROCEDURE — 25000003 PHARM REV CODE 250: Performed by: STUDENT IN AN ORGANIZED HEALTH CARE EDUCATION/TRAINING PROGRAM

## 2022-02-28 PROCEDURE — 37000009 HC ANESTHESIA EA ADD 15 MINS: Performed by: INTERNAL MEDICINE

## 2022-02-28 PROCEDURE — 63600175 PHARM REV CODE 636 W HCPCS: Performed by: STUDENT IN AN ORGANIZED HEALTH CARE EDUCATION/TRAINING PROGRAM

## 2022-02-28 PROCEDURE — G0105 COLORECTAL SCRN; HI RISK IND: ICD-10-PCS | Mod: ,,, | Performed by: INTERNAL MEDICINE

## 2022-02-28 PROCEDURE — G0105 COLORECTAL SCRN; HI RISK IND: HCPCS | Mod: ,,, | Performed by: INTERNAL MEDICINE

## 2022-02-28 RX ORDER — LIDOCAINE HYDROCHLORIDE 10 MG/ML
INJECTION, SOLUTION EPIDURAL; INFILTRATION; INTRACAUDAL; PERINEURAL
Status: DISCONTINUED | OUTPATIENT
Start: 2022-02-28 | End: 2022-02-28

## 2022-02-28 RX ORDER — PROPOFOL 10 MG/ML
VIAL (ML) INTRAVENOUS
Status: DISCONTINUED | OUTPATIENT
Start: 2022-02-28 | End: 2022-02-28

## 2022-02-28 RX ORDER — SODIUM CHLORIDE, SODIUM LACTATE, POTASSIUM CHLORIDE, CALCIUM CHLORIDE 600; 310; 30; 20 MG/100ML; MG/100ML; MG/100ML; MG/100ML
INJECTION, SOLUTION INTRAVENOUS CONTINUOUS PRN
Status: DISCONTINUED | OUTPATIENT
Start: 2022-02-28 | End: 2022-02-28

## 2022-02-28 RX ADMIN — PROPOFOL 50 MG: 10 INJECTION, EMULSION INTRAVENOUS at 10:02

## 2022-02-28 RX ADMIN — PROPOFOL 100 MG: 10 INJECTION, EMULSION INTRAVENOUS at 10:02

## 2022-02-28 RX ADMIN — LIDOCAINE HYDROCHLORIDE 50 MG: 10 INJECTION, SOLUTION EPIDURAL; INFILTRATION; INTRACAUDAL; PERINEURAL at 10:02

## 2022-02-28 RX ADMIN — SODIUM CHLORIDE, SODIUM LACTATE, POTASSIUM CHLORIDE, AND CALCIUM CHLORIDE: 600; 310; 30; 20 INJECTION, SOLUTION INTRAVENOUS at 10:02

## 2022-02-28 NOTE — ANESTHESIA POSTPROCEDURE EVALUATION
Anesthesia Post Evaluation    Patient: Shirley Amaya    Procedure(s) Performed: Procedure(s) (LRB):  COLONOSCOPY (N/A)    Final Anesthesia Type: MAC      Patient location during evaluation: GI PACU  Patient participation: Yes- Able to Participate  Level of consciousness: awake and alert and oriented  Post-procedure vital signs: reviewed and stable  Pain management: adequate  Airway patency: patent  YUDITH mitigation strategies: Multimodal analgesia  PONV status at discharge: No PONV  Anesthetic complications: no      Cardiovascular status: blood pressure returned to baseline  Respiratory status: unassisted  Hydration status: euvolemic  Follow-up not needed.          Vitals Value Taken Time   BP  02/28/22 1036   Temp  02/28/22 1036   Pulse  02/28/22 1036   Resp  02/28/22 1036   SpO2  02/28/22 1036         No case tracking events are documented in the log.      Pain/Bismark Score: No data recorded

## 2022-02-28 NOTE — ANESTHESIA PREPROCEDURE EVALUATION
02/28/2022  Shirley Amaya is a 62 y.o., female.    Pre-op Assessment    I have reviewed the Patient Summary Reports.    I have reviewed the Nursing Notes. I have reviewed the NPO Status.   I have reviewed the Medications.     Review of Systems  Anesthesia Hx:  No problems with previous Anesthesia  Denies Family Hx of Anesthesia complications.   Denies Personal Hx of Anesthesia complications.   Social:  Non-Smoker    Hematology/Oncology:  Hematology Normal   Oncology Normal     EENT/Dental:EENT/Dental Normal   Cardiovascular:  Cardiovascular Normal Exercise tolerance: good     Renal/:  Renal/ Normal     Hepatic/GI:  Hepatic/GI Normal    Neurological:  Neurology Normal    Endocrine:  Endocrine Normal    Dermatological:  Skin Normal    Psych:  Psychiatric Normal           Physical Exam  General:  Well nourished and Obesity      Airway/Jaw/Neck:  Airway Findings: Mouth Opening: Normal   Tongue: Normal   General Airway Assessment: Adult, Good Mallampati: II        Eyes/Ears/Nose:  EYES/EARS/NOSE FINDINGS: Normal   Dental:  Dental Findings: In tact     Chest/Lungs:  Chest/Lungs Findings: Normal Respiratory Rate      Heart/Vascular:  Heart Findings: Rate: Normal  Heart murmur: negative Vascular Findings: Normal    Abdomen:  Abdomen Findings: Normal    Musculoskeletal:  Musculoskeletal Findings: Normal   Skin:  Skin Findings: Normal    Mental Status:  Mental Status Findings:  Alert and Oriented, Cooperative         Anesthesia Plan  Type of Anesthesia, risks & benefits discussed:  Anesthesia Type:  MAC    Patient's Preference:   Plan Factors:          Intra-op Monitoring Plan: standard ASA monitors  Intra-op Monitoring Plan Comments:   Post Op Pain Control Plan: multimodal analgesia and per primary service following discharge from PACU  Post Op Pain Control Plan Comments:     Induction:   IV  Beta  Blocker:  Patient is not currently on a Beta-Blocker (No further documentation required).       Informed Consent: Informed consent signed with the Patient and all parties understand the risks and agree with anesthesia plan.  All questions answered.  Anesthesia consent signed with patient.  ASA Score: 2     Day of Surgery Review of History & Physical: I have interviewed and examined the patient. I have reviewed the patient's H&P dated:  There are no significant changes.            Ready For Surgery From Anesthesia Perspective.           Physical Exam  General: Well nourished and Obesity    Airway:  Mallampati: II   Mouth Opening: Normal  Tongue: Normal    Dental:  In tact    Chest/Lungs:  Normal Respiratory Rate    Heart:  Rate: Normal          Anesthesia Plan  Type of Anesthesia, risks & benefits discussed:    Anesthesia Type: MAC  Intra-op Monitoring Plan: standard ASA monitors  Post Op Pain Control Plan: multimodal analgesia and per primary service following discharge from PACU  Induction:  IV  Informed Consent: Informed consent signed with the Patient and all parties understand the risks and agree with anesthesia plan.  All questions answered.   ASA Score: 2  Day of Surgery Review of History & Physical: I have interviewed and examined the patient. I have reviewed the patient's H&P dated:     Ready For Surgery From Anesthesia Perspective.       .

## 2022-02-28 NOTE — H&P
PRE PROCEDURE H&P    Patient Name: Shirley Amaya  MRN: [id number]  : 1960  Date of Procedure:  2022  Referring Physician: Milton Bailey MD  Primary Physician: Milton Monzon MD  Procedure Physician: Andrae Barger MD       Planned Procedure: Colonoscopy  Diagnosis: previous adenomatous polyp  Chief Complaint: Same as above    HPI: Patient is an 62 y.o. female is here for the above.     Last colonoscopy:   Family history: None   Anticoagulation: None     Past Medical History:   Past Medical History:   Diagnosis Date    Abnormal glucose     Hyperlipidemia     OA (osteoarthritis) of knee     YUDITH on CPAP         Past Surgical History:  Past Surgical History:   Procedure Laterality Date    CERVICAL CONIZATION   W/ LASER      COLONOSCOPY N/A 2016    Procedure: COLONOSCOPY;  Surgeon: Mary Victor MD;  Location: [facility];  Service: Endoscopy;  Laterality: N/A;        Home Medications:  Prior to Admission medications    Medication Sig Start Date End Date Taking? Authorizing Provider   clobetasol 0.05% (TEMOVATE) 0.05 % Oint Apply topically nightly as needed (itching). Do not use for more than 2 weeks continously 22 Yes Elisabeth Moise NP   meloxicam (MOBIC) 15 MG tablet Take 1 tablet (15 mg total) by mouth daily as needed for Pain. 21  Yes Milton Monzon MD   metFORMIN (GLUCOPHAGE) 500 MG tablet Take 1 tablet (500 mg total) by mouth daily with breakfast. 21 Yes Milton Monzon MD   atorvastatin (LIPITOR) 20 MG tablet Take 1 tablet (20 mg total) by mouth once daily. 21  Milton Monzon MD        Allergies:  Review of patient's allergies indicates:  No Known Allergies     Social History:   Social History     Socioeconomic History    Marital status:     Number of children: 2   Occupational History    Occupation: [employer]   Tobacco Use    Smoking status: Former Smoker     Packs/day: 2.00     Years:  "25.00     Pack years: 50.00     Quit date: 2000     Years since quittin.8    Smokeless tobacco: Never Used   Substance and Sexual Activity    Alcohol use: No    Drug use: No    Sexual activity: Yes     Partners: Male     Birth control/protection: Post-menopausal       Family History:  Family History   Problem Relation Age of Onset    Diabetes Father     Lung cancer Sister     Stomach cancer Brother     Heart disease Neg Hx     Hypertension Neg Hx     Colon cancer Neg Hx     Breast cancer Neg Hx     Ovarian cancer Neg Hx        ROS: No acute cardiac events, no acute respiratory complaints.     Physical Exam (all patients):    /76 (BP Location: Left arm, Patient Position: Sitting)   Pulse 74   Resp 19   Ht 5' 4" (1.626 m)   Wt 77.1 kg (170 lb)   SpO2 98%   Breastfeeding No   BMI 29.18 kg/m²   Lungs: Clear to auscultation bilaterally, respirations unlabored  Heart: Regular rate and rhythm, S1 and S2 normal, no obvious murmurs  Abdomen:         Soft, non-tender, bowel sounds normal, no masses, no organomegaly    Lab Results   Component Value Date    WBC 4.51 2022    MCV 92 2022    RDW 13.4 2022     2022    GLU 85 2022    HGBA1C 5.7 (H) 2022    BUN 12 2022     2022    K 4.3 2022     2022        SEDATION PLAN: per anesthesia      History reviewed, vital signs satisfactory, cardiopulmonary status satisfactory, sedation options, risks and plans have been discussed with the patient  All their questions were answered and the patient agrees to the sedation procedures as planned and the patient is deemed an appropriate candidate for the sedation as planned.    Procedure explained to patient, informed consent obtained and placed in chart.    Andrae Barger  2022  10:09 AM     "

## 2022-02-28 NOTE — TRANSFER OF CARE
"Anesthesia Transfer of Care Note    Patient: Shirley Amaya    Procedure(s) Performed: Procedure(s) (LRB):  COLONOSCOPY (N/A)    Patient location: PACU    Anesthesia Type: MAC    Transport from OR: Transported from OR on room air with adequate spontaneous ventilation    Post pain: adequate analgesia    Post assessment: no apparent anesthetic complications    Post vital signs: stable    Level of consciousness: responds to stimulation and awake    Nausea/Vomiting: no nausea/vomiting    Complications: none    Transfer of care protocol was followed      Last vitals:   Visit Vitals  /76 (BP Location: Left arm, Patient Position: Sitting)   Pulse 74   Resp 19   Ht 5' 4" (1.626 m)   Wt 77.1 kg (170 lb)   SpO2 98%   Breastfeeding No   BMI 29.18 kg/m²     "

## 2022-02-28 NOTE — PROVATION PATIENT INSTRUCTIONS
Discharge Summary/Instructions after an Endoscopic Procedure  Patient Name: Shirley Amaya  Patient MRN: [id number]  Patient YOB: 1960 Monday, February 28, 2022 Andrae Barger MD  Dear patient,  As a result of recent federal legislation (The Federal Cures Act), you may   receive lab or pathology results from your procedure in your [facility]   account before your physician is able to contact you. Your physician or   their representative will relay the results to you with their   recommendations at their soonest availability.  Thank you,  RESTRICTIONS:  During your procedure today, you received medications for sedation.  These   medications may affect your judgment, balance and coordination.  Therefore,   for 24 hours, you have the following restrictions:   - DO NOT drive a car, operate machinery, make legal/financial decisions,   sign important papers or drink alcohol.    ACTIVITY:  Today: no heavy lifting, straining or running due to procedural   sedation/anesthesia.  The following day: return to full activity including work.  DIET:  Eat and drink normally unless instructed otherwise.     TREATMENT FOR COMMON SIDE EFFECTS:  - Mild abdominal pain, nausea, belching, bloating or excessive gas:  rest,   eat lightly and use a heating pad.  - Sore Throat: treat with throat lozenges and/or gargle with warm salt   water.  - Because air was used during the procedure, expelling large amounts of air   from your rectum or belching is normal.  - If a bowel prep was taken, you may not have a bowel movement for 1-3 days.    This is normal.  SYMPTOMS TO WATCH FOR AND REPORT TO YOUR PHYSICIAN:  1. Abdominal pain or bloating, other than gas cramps.  2. Chest pain.  3. Back pain.  4. Signs of infection such as: chills or fever occurring within 24 hours   after the procedure.  5. Rectal bleeding, which would show as bright red, maroon, or black stools.   (A tablespoon of blood from the rectum is not serious, especially  if   hemorrhoids are present.)  6. Vomiting.  7. Weakness or dizziness.  GO DIRECTLY TO THE NEAREST EMERGENCY ROOM IF YOU HAVE ANY OF THE FOLLOWING:      Difficulty breathing              Chills and/or fever over 101 F   Persistent vomiting and/or vomiting blood   Severe abdominal pain   Severe chest pain   Black, tarry stools   Bleeding- more than one tablespoon   Any other symptom or condition that you feel may need urgent attention  Your doctor recommends these additional instructions:  If any biopsies were taken, your doctors clinic will contact you in 1 to 2   weeks with any results.  - Discharge patient to home.   - Resume previous diet.   - Continue present medications.   - Repeat colonoscopy in 5 years for surveillance.   - Return to referring physician.   - Patient has a contact number available for emergencies.  The signs and   symptoms of potential delayed complications were discussed with the   patient.  Return to normal activities tomorrow.  Written discharge   instructions were provided to the patient.  For questions, problems or results please call your physician Andrae Barger MD at Work:  [phone]  If you have any questions about the above instructions, call the GI   department at [phone] or call the endoscopy unit at [phone]   from 7am until 3 pm.  [facility] [place], EMERGENCY ROOM PHONE NUMBER:   [phone]  IF A COMPLICATION OR EMERGENCY SITUATION ARISES AND YOU ARE UNABLE TO REACH   YOUR PHYSICIAN - GO DIRECTLY TO THE EMERGENCY ROOM.  I have read or have had read to me these discharge instructions for my   procedure and have received a written copy.  I understand these   instructions and will follow-up with my physician if I have any questions.     __________________________________       _____________________________________  Nurse Signature                                          Patient/Designated   Responsible Party Signature  Andrae HERNANDEZ  MD [place]  2/28/2022 10:38:07 AM  This report has been verified and signed electronically.  Dear patient,  As a result of recent federal legislation (The Federal Cures Act), you may   receive lab or pathology results from your procedure in your [facility]   account before your physician is able to contact you. Your physician or   their representative will relay the results to you with their   recommendations at their soonest availability.  Thank you,  PROVATION

## 2022-03-01 VITALS
DIASTOLIC BLOOD PRESSURE: 75 MMHG | WEIGHT: 170 LBS | TEMPERATURE: 98 F | OXYGEN SATURATION: 98 % | SYSTOLIC BLOOD PRESSURE: 122 MMHG | BODY MASS INDEX: 29.02 KG/M2 | HEART RATE: 64 BPM | RESPIRATION RATE: 17 BRPM | HEIGHT: 64 IN

## 2022-03-29 LAB — POCT GLUCOSE: 106 MG/DL (ref 70–110)

## 2022-05-03 RX ORDER — METFORMIN HYDROCHLORIDE 500 MG/1
TABLET ORAL
Qty: 180 TABLET | Refills: 2 | Status: SHIPPED | OUTPATIENT
Start: 2022-05-03 | End: 2023-01-27

## 2022-05-03 NOTE — TELEPHONE ENCOUNTER
Refill Routing Note   Medication(s) are not appropriate for processing by [facility] for the following reason(s):      - Patient has been seen in the ED/Hospital since the last PCP visit    [facility] action(s):  Defer          Medication reconciliation completed: No     Appointments  past 12m or future 3m with PCP    Date Provider   Last Visit   1/10/2022 Milton Monzon MD   Next Visit   7/11/2022 Milton Monzon MD   ED visits in past 90 days: 0        Note composed:7:15 AM 05/03/2022

## 2022-05-03 NOTE — TELEPHONE ENCOUNTER
Care Due:                  Date            Visit Type   Department     Provider  --------------------------------------------------------------------------------                                EP -                              PRIMARY      [facility] INTERNAL  Last Visit: 01-      CARE ([facility])   MEDICINE       Milton Disla                              EP -                              PRIMARY      [facility] INTERNAL  Next Visit: 07-      CARE ([facility])   MEDICINE       Milton Disla                                                            Last  Test          Frequency    Reason                     Performed    Due Date  --------------------------------------------------------------------------------    HBA1C.......  6 months...  metFORMIN................  01- 07-    Powered by [employer] "[employer] Reference number: [id number].   5/03/2022 6:47:44 AM CDT

## 2022-07-05 ENCOUNTER — LAB VISIT (OUTPATIENT)
Dept: LAB | Facility: HOSPITAL | Age: 62
End: 2022-07-05
Attending: FAMILY MEDICINE
Payer: COMMERCIAL

## 2022-07-05 DIAGNOSIS — R73.09 ABNORMAL GLUCOSE: ICD-10-CM

## 2022-07-05 LAB
ESTIMATED AVG GLUCOSE: 123 MG/DL (ref 68–131)
HBA1C MFR BLD: 5.9 % (ref 4–5.6)

## 2022-07-05 PROCEDURE — 36415 COLL VENOUS BLD VENIPUNCTURE: CPT | Mod: PO | Performed by: FAMILY MEDICINE

## 2022-07-05 PROCEDURE — 83036 HEMOGLOBIN GLYCOSYLATED A1C: CPT | Performed by: FAMILY MEDICINE

## 2022-07-08 ENCOUNTER — OFFICE VISIT (OUTPATIENT)
Dept: URGENT CARE | Facility: CLINIC | Age: 62
End: 2022-07-08
Payer: COMMERCIAL

## 2022-07-08 VITALS
DIASTOLIC BLOOD PRESSURE: 76 MMHG | TEMPERATURE: 97 F | WEIGHT: 168 LBS | SYSTOLIC BLOOD PRESSURE: 161 MMHG | HEIGHT: 64 IN | RESPIRATION RATE: 16 BRPM | HEART RATE: 69 BPM | OXYGEN SATURATION: 96 % | BODY MASS INDEX: 28.68 KG/M2

## 2022-07-08 DIAGNOSIS — B96.89 ACUTE BACTERIAL RHINOSINUSITIS: Primary | ICD-10-CM

## 2022-07-08 DIAGNOSIS — J34.89 SINUS PRESSURE: ICD-10-CM

## 2022-07-08 DIAGNOSIS — J01.90 ACUTE BACTERIAL RHINOSINUSITIS: Primary | ICD-10-CM

## 2022-07-08 DIAGNOSIS — J02.9 ACUTE SORE THROAT: ICD-10-CM

## 2022-07-08 DIAGNOSIS — Z11.52 ENCOUNTER FOR SCREENING LABORATORY TESTING FOR COVID-19 VIRUS: ICD-10-CM

## 2022-07-08 DIAGNOSIS — R09.81 SINUS CONGESTION: ICD-10-CM

## 2022-07-08 DIAGNOSIS — R03.0 ELEVATED BLOOD PRESSURE READING: ICD-10-CM

## 2022-07-08 LAB
CTP QC/QA: YES
SARS-COV-2 RDRP RESP QL NAA+PROBE: NEGATIVE

## 2022-07-08 PROCEDURE — 3044F HG A1C LEVEL LT 7.0%: CPT | Mod: CPTII,S$GLB,, | Performed by: PHYSICIAN ASSISTANT

## 2022-07-08 PROCEDURE — U0002 COVID-19 LAB TEST NON-CDC: HCPCS | Mod: QW,S$GLB,, | Performed by: PHYSICIAN ASSISTANT

## 2022-07-08 PROCEDURE — 3008F BODY MASS INDEX DOCD: CPT | Mod: CPTII,S$GLB,, | Performed by: PHYSICIAN ASSISTANT

## 2022-07-08 PROCEDURE — 3078F DIAST BP <80 MM HG: CPT | Mod: CPTII,S$GLB,, | Performed by: PHYSICIAN ASSISTANT

## 2022-07-08 PROCEDURE — 1159F PR MEDICATION LIST DOCUMENTED IN MEDICAL RECORD: ICD-10-PCS | Mod: CPTII,S$GLB,, | Performed by: PHYSICIAN ASSISTANT

## 2022-07-08 PROCEDURE — 3078F PR MOST RECENT DIASTOLIC BLOOD PRESSURE < 80 MM HG: ICD-10-PCS | Mod: CPTII,S$GLB,, | Performed by: PHYSICIAN ASSISTANT

## 2022-07-08 PROCEDURE — 99214 OFFICE O/P EST MOD 30 MIN: CPT | Mod: S$GLB,,, | Performed by: PHYSICIAN ASSISTANT

## 2022-07-08 PROCEDURE — 3077F SYST BP >= 140 MM HG: CPT | Mod: CPTII,S$GLB,, | Performed by: PHYSICIAN ASSISTANT

## 2022-07-08 PROCEDURE — 3044F PR MOST RECENT HEMOGLOBIN A1C LEVEL <7.0%: ICD-10-PCS | Mod: CPTII,S$GLB,, | Performed by: PHYSICIAN ASSISTANT

## 2022-07-08 PROCEDURE — 3008F PR BODY MASS INDEX (BMI) DOCUMENTED: ICD-10-PCS | Mod: CPTII,S$GLB,, | Performed by: PHYSICIAN ASSISTANT

## 2022-07-08 PROCEDURE — 1159F MED LIST DOCD IN RCRD: CPT | Mod: CPTII,S$GLB,, | Performed by: PHYSICIAN ASSISTANT

## 2022-07-08 PROCEDURE — 1160F RVW MEDS BY RX/DR IN RCRD: CPT | Mod: CPTII,S$GLB,, | Performed by: PHYSICIAN ASSISTANT

## 2022-07-08 PROCEDURE — 99214 PR OFFICE/OUTPT VISIT, EST, LEVL IV, 30-39 MIN: ICD-10-PCS | Mod: S$GLB,,, | Performed by: PHYSICIAN ASSISTANT

## 2022-07-08 PROCEDURE — 3077F PR MOST RECENT SYSTOLIC BLOOD PRESSURE >= 140 MM HG: ICD-10-PCS | Mod: CPTII,S$GLB,, | Performed by: PHYSICIAN ASSISTANT

## 2022-07-08 PROCEDURE — U0002: ICD-10-PCS | Mod: QW,S$GLB,, | Performed by: PHYSICIAN ASSISTANT

## 2022-07-08 PROCEDURE — 1160F PR REVIEW ALL MEDS BY PRESCRIBER/CLIN PHARMACIST DOCUMENTED: ICD-10-PCS | Mod: CPTII,S$GLB,, | Performed by: PHYSICIAN ASSISTANT

## 2022-07-08 RX ORDER — FLUTICASONE PROPIONATE 50 MCG
1 SPRAY, SUSPENSION (ML) NASAL DAILY
Qty: 9.9 ML | Refills: 0 | Status: SHIPPED | OUTPATIENT
Start: 2022-07-08 | End: 2022-08-07

## 2022-07-08 RX ORDER — AMOXICILLIN AND CLAVULANATE POTASSIUM 875; 125 MG/1; MG/1
1 TABLET, FILM COATED ORAL 2 TIMES DAILY
Qty: 14 TABLET | Refills: 0 | Status: SHIPPED | OUTPATIENT
Start: 2022-07-08 | End: 2022-07-15

## 2022-07-08 NOTE — PROGRESS NOTES
"Subjective:       Patient ID: Shirley Amaya is a 62 y.o. female.    Vitals:  height is 5' 4" (1.626 m) and weight is 76.2 kg (168 lb). Her tympanic temperature is 97.1 °F (36.2 °C). Her blood pressure is 161/76 (abnormal) and her pulse is 69. Her respiration is 16 and oxygen saturation is 96%.     Chief Complaint: Nasal Congestion    62-year-old female presents urgent care with  complaining of nasal congestion which has been going on for 2 weeks.  There is associated sinus pressure, sinus pain, sore throat, fatigue, and headache.  Reports that she tried multiple over-the-counter decongestants + cold and flu medications with no relief.    Other  This is a new problem. The current episode started 1 to 4 weeks ago (Started about two weeks ago). The problem occurs constantly. The problem has been gradually worsening. Associated symptoms include congestion, fatigue and a sore throat. Pertinent negatives include no abdominal pain, anorexia, arthralgias, change in bowel habit, chest pain, chills, coughing, diaphoresis, fever, headaches, joint swelling, myalgias, nausea, neck pain, numbness, rash, swollen glands, urinary symptoms, vertigo, visual change, vomiting or weakness. Nothing aggravates the symptoms. She has tried acetaminophen for the symptoms. The treatment provided no relief.       Constitution: Positive for fatigue. Negative for chills, sweating and fever.   HENT: Positive for congestion, postnasal drip, sinus pain, sinus pressure and sore throat.    Neck: Negative for neck pain.   Cardiovascular: Negative for chest pain.   Respiratory: Negative for cough.    Gastrointestinal: Negative for abdominal pain, nausea and vomiting.   Musculoskeletal: Negative for joint pain, joint swelling and muscle ache.   Skin: Negative for rash and erythema.   Neurological: Negative for history of vertigo, headaches and numbness.       Objective:       Vitals:    07/08/22 1739   BP: (!) 161/76   Pulse: 69   Resp: " "16   Temp: 97.1 °F (36.2 °C)   TempSrc: Tympanic   SpO2: 96%   Weight: 76.2 kg (168 lb)   Height: 5' 4" (1.626 m)       Physical Exam   Constitutional: She is oriented to person, place, and time. She appears well-developed. She is cooperative.  Non-toxic appearance. She appears ill. No distress. awake  HENT:   Head: Normocephalic and atraumatic. Head is without raccoon's eyes and without Condon's sign.   Ears:   Right Ear: Hearing and external ear normal. No drainage, swelling or tenderness. Tympanic membrane is erythematous. Tympanic membrane is not bulging. A middle ear effusion is present. No decreased hearing is noted. impacted cerumen  Left Ear: Hearing and external ear normal. No drainage, swelling or tenderness. Tympanic membrane is erythematous. Tympanic membrane is not bulging. A middle ear effusion is present. No decreased hearing is noted. impacted cerumen  Nose: Rhinorrhea and congestion present. No mucosal edema, purulent discharge or nasal deformity. No epistaxis. Right sinus exhibits maxillary sinus tenderness and frontal sinus tenderness. Left sinus exhibits maxillary sinus tenderness and frontal sinus tenderness.   Mouth/Throat: Uvula is midline and mucous membranes are normal. Mucous membranes are moist. No oral lesions. No trismus in the jaw. Normal dentition. No uvula swelling. Posterior oropharyngeal erythema and cobblestoning (mild) present. No oropharyngeal exudate, posterior oropharyngeal edema or tonsillar abscesses. Tonsils are 1+ on the right. Tonsils are 1+ on the left. No tonsillar exudate. Oropharynx is clear.      Comments: PND    Eyes: Conjunctivae and lids are normal. Pupils are equal, round, and reactive to light. Right eye exhibits no discharge. Left eye exhibits no discharge. No scleral icterus. Right eye exhibits normal extraocular motion and no nystagmus. Left eye exhibits normal extraocular motion and no nystagmus. Extraocular movement intact vision grossly intact gaze aligned " appropriately periorbital hyperpigmentation      Comments: Watery eyes bilat   Neck: Trachea normal and phonation normal. Neck supple. No Brudzinski's sign and no Kernig's sign noted. No neck rigidity present. muscular tenderness (mild) present.   Cardiovascular: Normal rate, regular rhythm, S1 normal, S2 normal, normal heart sounds and normal pulses. Exam reveals no decreased pulses.   Pulmonary/Chest: Effort normal and breath sounds normal. No accessory muscle usage or stridor. No tachypnea. No respiratory distress. She has no decreased breath sounds. She has no wheezes. She has no rhonchi. She has no rales. She exhibits no tenderness, no crepitus and no swelling.   Transmitted upper airway sound cleared with coughing         Comments: Transmitted upper airway sound cleared with coughing    Abdominal: Normal appearance and bowel sounds are normal. She exhibits no distension. Soft.   Musculoskeletal: Normal range of motion.         General: No swelling, tenderness, deformity or signs of injury. Normal range of motion.      Right lower leg: No edema.      Left lower leg: No edema.   Lymphadenopathy:     She has cervical adenopathy.   Neurological: no focal deficit. She is alert, oriented to person, place, and time and at baseline. She displays facial symmetry and no dysarthria. No cranial nerve deficit.   Skin: Skin is warm, dry, intact, not diaphoretic, not pale and no rash. Capillary refill takes less than 2 seconds. No erythema and No lesion   Psychiatric: She experiences Normal attention and Normal perception. Her speech is normal and behavior is normal. Mood, memory, affect, judgment and thought content normal. Cognition normal  Nursing note and vitals reviewed.        Assessment:       1. Acute bacterial rhinosinusitis    2. Sinus congestion    3. Acute sore throat    4. Elevated blood pressure reading    5. Sinus pressure    6. Encounter for screening laboratory testing for COVID-19 virus        Results for  orders placed or performed in visit on 07/08/22   POCT COVID-19 Rapid Screening   Result Value Ref Range    POC Rapid COVID Negative Negative     Acceptable Yes        Plan:         Acute bacterial rhinosinusitis  -     amoxicillin-clavulanate 875-125mg (AUGMENTIN) 875-125 mg per tablet; Take 1 tablet by mouth 2 (two) times a day. for 7 days  Dispense: 14 tablet; Refill: 0    Sinus congestion  -     POCT COVID-19 Rapid Screening  -     fluticasone propionate (FLONASE) 50 mcg/actuation nasal spray; 1 spray (50 mcg total) by Each Nostril route once daily.  Dispense: 9.9 mL; Refill: 0    Acute sore throat  -     POCT COVID-19 Rapid Screening    Elevated blood pressure reading    Sinus pressure  -     fluticasone propionate (FLONASE) 50 mcg/actuation nasal spray; 1 spray (50 mcg total) by Each Nostril route once daily.  Dispense: 9.9 mL; Refill: 0    Encounter for screening laboratory testing for COVID-19 virus           Medical Decision Making:   Urgent Care Management:  Did not get steroids secondary to elevated blood pressure     Patient Instructions   Augmentin b.i.d. 7 days    SORE THROAT:    You may gargle with hot salt water 4 times a day for the next 2 days and then you may also gargle diluted hydrogen peroxide once to twice daily to alleviate some of your throat discomfort.  Drink plenty of fluids, recommend warm tea with honey.     YOU MAY USE OVER-THE-COUNTER CEPACOL FOR SOOTHING OF YOUR THROAT.  You may wish to avoid spicy food, citrus fruits, and red sauces- as this may irritate the throat more.    You can also take a daily anti-histamine such as Zyrtec, Claritin, Xyzal, OR Allegra-IN DAYTIME; NON DROWSY) AND/OR Benadryl- AT NIGHT; DROWSY) to help with runny nose/sneezing/sore throat/cough.    If your symptoms do not improve, you should return to this clinic. If your symptoms worsen, go to the emergency room.     Make sure to stay well hydrated.    Use Nasal Saline to mechanically move any  post nasal drip from your eustachian tube or from the back of your throat.    You may insert a whole garlic cloves into your nostrils and leave for 10-15 minutes. When you remove them, mucus will be pulled down. This may burn as garlic is strong.  Repeat as often as needed and able to tolerate.  Please do not use garlic if you have an allergy to garlic.    STOP All of these medications listed below because it is elevating your blood pressure.  pseudoephedrine OR any DM meds.       Sometimes Nyquil at night is beneficial to help you get some rest, however it is sedating and it does have an antihistamine and tylenol.    Honey is a natural cough suppressant that can be used.    Tylenol up to 4,000 mg a day is safe for short periods and can be used for headache, body aches, pain, and fever. However in high doses and prolonged use it can cause liver irritation.    Ibuprofen is a non-steroidal anti-inflammatory that can be used for headache, body aches, pain, and fever.However it can also cause stomach irritation if over used.    FLONASE AS DIRECTED--    How do you use a Nasal Spray?    Make sure you understand your dosing instructions. Spray only the number of prescribed sprays in each nostril. Read the package instructions before using your spray the first time.    Most sprays suggest the following steps:    Wash your hands well.    Gently blow your nose to clear the passageway.    Shake the container several times.    Tilt your head slightly downward.  Use the opposite hand from the nostril you will be spraying to hold the spray bottle.    Block one nostril with your finger.  Insert the nasal applicator into the other nostril.    Aim the spray toward the outer wall of the nostril.  Inhale slowly through the nose and press the .    Breathe out and repeat to apply the prescribed number of sprays.  Repeat these steps for the other nostril.     Avoid sneezing or blowing your nose right after spraying.            ELEVATED BLOOD PRESSURE READING:    - Your blood pressure was noted to be elevated in clinic today.-- please keep an eye on blood pressures.  - Record blood pressures and follow up with primary care doctor if blood pressures continue to be elevated.  - Here are a few generalized recommended lifestyle modifications proven to lower BPs--DASH diet, exercise, weight loss, reducing stress.  *Of note: above recommendations are generalized conservative lifestyle modifications that include but are not limited to above list.   Please do not attempt any of the above recommendations if they do not pertain to you or should cause overall detriment to your health.   Please call the clinic or your PCP with any questions you may have in regards to BP and lifestyle modifications.        - You must understand that you have received an Urgent Care treatment only and that you may be released before all of your medical problems are known or treated.   - You, the patient, will arrange for follow up care as instructed with your primary care provider or recommended specialist.   - If your condition worsens or fails to improve we recommend that you receive another evaluation at the ER immediately or contact your PCP to discuss your concerns, or return here.   - Please do not drive or make any important decisions for 24 hours if you have received any pain medications, sedatives or mood altering drugs during your visit.    Disclaimer: This document was drafted with the use of a voice recognition device and is likely to have sound alike errors.

## 2022-07-08 NOTE — PATIENT INSTRUCTIONS
Augmentin b.i.d. 7 days    SORE THROAT:    You may gargle with hot salt water 4 times a day for the next 2 days and then you may also gargle diluted hydrogen peroxide once to twice daily to alleviate some of your throat discomfort.  Drink plenty of fluids, recommend warm tea with honey.     YOU MAY USE OVER-THE-COUNTER CEPACOL FOR SOOTHING OF YOUR THROAT.  You may wish to avoid spicy food, citrus fruits, and red sauces- as this may irritate the throat more.    You can also take a daily anti-histamine such as Zyrtec, Claritin, Xyzal, OR Allegra-IN DAYTIME; NON DROWSY) AND/OR Benadryl- AT NIGHT; DROWSY) to help with runny nose/sneezing/sore throat/cough.    If your symptoms do not improve, you should return to this clinic. If your symptoms worsen, go to the emergency room.     Make sure to stay well hydrated.    Use Nasal Saline to mechanically move any post nasal drip from your eustachian tube or from the back of your throat.    You may insert a whole garlic cloves into your nostrils and leave for 10-15 minutes. When you remove them, mucus will be pulled down. This may burn as garlic is strong.  Repeat as often as needed and able to tolerate.  Please do not use garlic if you have an allergy to garlic.    STOP All of these medications listed below because it is elevating your blood pressure.  pseudoephedrine OR any DM meds.       Sometimes Nyquil at night is beneficial to help you get some rest, however it is sedating and it does have an antihistamine and tylenol.    Honey is a natural cough suppressant that can be used.    Tylenol up to 4,000 mg a day is safe for short periods and can be used for headache, body aches, pain, and fever. However in high doses and prolonged use it can cause liver irritation.    Ibuprofen is a non-steroidal anti-inflammatory that can be used for headache, body aches, pain, and fever.However it can also cause stomach irritation if over used.    FLONASE AS DIRECTED--    How do you use a  Nasal Spray?    Make sure you understand your dosing instructions. Spray only the number of prescribed sprays in each nostril. Read the package instructions before using your spray the first time.    Most sprays suggest the following steps:    Wash your hands well.    Gently blow your nose to clear the passageway.    Shake the container several times.    Tilt your head slightly downward.  Use the opposite hand from the nostril you will be spraying to hold the spray bottle.    Block one nostril with your finger.  Insert the nasal applicator into the other nostril.    Aim the spray toward the outer wall of the nostril.  Inhale slowly through the nose and press the .    Breathe out and repeat to apply the prescribed number of sprays.  Repeat these steps for the other nostril.     Avoid sneezing or blowing your nose right after spraying.           ELEVATED BLOOD PRESSURE READING:    - Your blood pressure was noted to be elevated in clinic today.-- please keep an eye on blood pressures.  - Record blood pressures and follow up with primary care doctor if blood pressures continue to be elevated.  - Here are a few generalized recommended lifestyle modifications proven to lower BPs--DASH diet, exercise, weight loss, reducing stress.  *Of note: above recommendations are generalized conservative lifestyle modifications that include but are not limited to above list.   Please do not attempt any of the above recommendations if they do not pertain to you or should cause overall detriment to your health.   Please call the clinic or your PCP with any questions you may have in regards to BP and lifestyle modifications.        - You must understand that you have received an Urgent Care treatment only and that you may be released before all of your medical problems are known or treated.   - You, the patient, will arrange for follow up care as instructed with your primary care provider or recommended specialist.   - If  your condition worsens or fails to improve we recommend that you receive another evaluation at the ER immediately or contact your PCP to discuss your concerns, or return here.   - Please do not drive or make any important decisions for 24 hours if you have received any pain medications, sedatives or mood altering drugs during your visit.    Disclaimer: This document was drafted with the use of a voice recognition device and is likely to have sound alike errors.

## 2022-07-11 ENCOUNTER — TELEPHONE (OUTPATIENT)
Dept: URGENT CARE | Facility: CLINIC | Age: 62
End: 2022-07-11
Payer: COMMERCIAL

## 2022-08-06 ENCOUNTER — OFFICE VISIT (OUTPATIENT)
Dept: URGENT CARE | Facility: CLINIC | Age: 62
End: 2022-08-06
Payer: COMMERCIAL

## 2022-08-06 VITALS
OXYGEN SATURATION: 98 % | BODY MASS INDEX: 28.68 KG/M2 | RESPIRATION RATE: 16 BRPM | HEART RATE: 79 BPM | WEIGHT: 168 LBS | HEIGHT: 64 IN | DIASTOLIC BLOOD PRESSURE: 56 MMHG | SYSTOLIC BLOOD PRESSURE: 121 MMHG | TEMPERATURE: 97 F

## 2022-08-06 DIAGNOSIS — J32.4 CHRONIC PANSINUSITIS: Primary | ICD-10-CM

## 2022-08-06 DIAGNOSIS — R51.9 SINUS HEADACHE: ICD-10-CM

## 2022-08-06 DIAGNOSIS — J34.89 SINUS PRESSURE: ICD-10-CM

## 2022-08-06 DIAGNOSIS — R09.81 SINUS CONGESTION: ICD-10-CM

## 2022-08-06 LAB
CTP QC/QA: YES
SARS-COV-2 RDRP RESP QL NAA+PROBE: NEGATIVE

## 2022-08-06 PROCEDURE — 3044F HG A1C LEVEL LT 7.0%: CPT | Mod: CPTII,S$GLB,, | Performed by: PHYSICIAN ASSISTANT

## 2022-08-06 PROCEDURE — 99214 OFFICE O/P EST MOD 30 MIN: CPT | Mod: 25,S$GLB,, | Performed by: PHYSICIAN ASSISTANT

## 2022-08-06 PROCEDURE — 3008F PR BODY MASS INDEX (BMI) DOCUMENTED: ICD-10-PCS | Mod: CPTII,S$GLB,, | Performed by: PHYSICIAN ASSISTANT

## 2022-08-06 PROCEDURE — 3074F SYST BP LT 130 MM HG: CPT | Mod: CPTII,S$GLB,, | Performed by: PHYSICIAN ASSISTANT

## 2022-08-06 PROCEDURE — 1160F PR REVIEW ALL MEDS BY PRESCRIBER/CLIN PHARMACIST DOCUMENTED: ICD-10-PCS | Mod: CPTII,S$GLB,, | Performed by: PHYSICIAN ASSISTANT

## 2022-08-06 PROCEDURE — U0002: ICD-10-PCS | Mod: QW,S$GLB,, | Performed by: PHYSICIAN ASSISTANT

## 2022-08-06 PROCEDURE — 3074F PR MOST RECENT SYSTOLIC BLOOD PRESSURE < 130 MM HG: ICD-10-PCS | Mod: CPTII,S$GLB,, | Performed by: PHYSICIAN ASSISTANT

## 2022-08-06 PROCEDURE — 1159F PR MEDICATION LIST DOCUMENTED IN MEDICAL RECORD: ICD-10-PCS | Mod: CPTII,S$GLB,, | Performed by: PHYSICIAN ASSISTANT

## 2022-08-06 PROCEDURE — 1159F MED LIST DOCD IN RCRD: CPT | Mod: CPTII,S$GLB,, | Performed by: PHYSICIAN ASSISTANT

## 2022-08-06 PROCEDURE — 96372 THER/PROPH/DIAG INJ SC/IM: CPT | Mod: S$GLB,,, | Performed by: PHYSICIAN ASSISTANT

## 2022-08-06 PROCEDURE — U0002 COVID-19 LAB TEST NON-CDC: HCPCS | Mod: QW,S$GLB,, | Performed by: PHYSICIAN ASSISTANT

## 2022-08-06 PROCEDURE — 99214 PR OFFICE/OUTPT VISIT, EST, LEVL IV, 30-39 MIN: ICD-10-PCS | Mod: 25,S$GLB,, | Performed by: PHYSICIAN ASSISTANT

## 2022-08-06 PROCEDURE — 3078F DIAST BP <80 MM HG: CPT | Mod: CPTII,S$GLB,, | Performed by: PHYSICIAN ASSISTANT

## 2022-08-06 PROCEDURE — 96372 PR INJECTION,THERAP/PROPH/DIAG2ST, IM OR SUBCUT: ICD-10-PCS | Mod: S$GLB,,, | Performed by: PHYSICIAN ASSISTANT

## 2022-08-06 PROCEDURE — 3044F PR MOST RECENT HEMOGLOBIN A1C LEVEL <7.0%: ICD-10-PCS | Mod: CPTII,S$GLB,, | Performed by: PHYSICIAN ASSISTANT

## 2022-08-06 PROCEDURE — 1160F RVW MEDS BY RX/DR IN RCRD: CPT | Mod: CPTII,S$GLB,, | Performed by: PHYSICIAN ASSISTANT

## 2022-08-06 PROCEDURE — 3078F PR MOST RECENT DIASTOLIC BLOOD PRESSURE < 80 MM HG: ICD-10-PCS | Mod: CPTII,S$GLB,, | Performed by: PHYSICIAN ASSISTANT

## 2022-08-06 PROCEDURE — 3008F BODY MASS INDEX DOCD: CPT | Mod: CPTII,S$GLB,, | Performed by: PHYSICIAN ASSISTANT

## 2022-08-06 RX ORDER — DEXAMETHASONE SODIUM PHOSPHATE 100 MG/10ML
5 INJECTION INTRAMUSCULAR; INTRAVENOUS ONCE
Status: COMPLETED | OUTPATIENT
Start: 2022-08-06 | End: 2022-08-06

## 2022-08-06 RX ORDER — AZELASTINE 1 MG/ML
1 SPRAY, METERED NASAL 2 TIMES DAILY
Qty: 30 ML | Refills: 0 | Status: SHIPPED | OUTPATIENT
Start: 2022-08-06 | End: 2023-02-17

## 2022-08-06 RX ADMIN — DEXAMETHASONE SODIUM PHOSPHATE 5 MG: 100 INJECTION INTRAMUSCULAR; INTRAVENOUS at 04:08

## 2022-08-06 NOTE — PROGRESS NOTES
"Subjective:       Patient ID: Shirley Amaya is a 62 y.o. female.    Vitals:  height is 5' 4" (1.626 m) and weight is 76.2 kg (168 lb). Her tympanic temperature is 97.4 °F (36.3 °C). Her blood pressure is 121/56 (abnormal) and her pulse is 79. Her respiration is 16 and oxygen saturation is 98%.     Chief Complaint: Sinus Problem    62-year-old female presents to Urgent Care with  complaining of sinus congestion/sinus pressure which has been going on for more than 1 minute.  The patient was seen here last month for the same issue.  Treated with antibiotics in which she reports no improvement.  Requesting steroid shot today.    Sinus Problem  This is a new problem. The current episode started more than 1 month ago. The problem is unchanged. There has been no fever. Her pain is at a severity of 0/10. She is experiencing no pain. Associated symptoms include congestion, headaches, sinus pressure and sneezing. Pertinent negatives include no chills, coughing, diaphoresis, ear pain, hoarse voice, neck pain, shortness of breath, sore throat or swollen glands. Treatments tried: Sudafed/ Tylenol. The treatment provided no relief.       Constitution: Negative for chills and sweating.   HENT: Positive for congestion, postnasal drip and sinus pressure. Negative for ear pain and sore throat.    Neck: Negative for neck pain.   Respiratory: Negative for cough and shortness of breath.    Allergic/Immunologic: Positive for seasonal allergies and sneezing.   Neurological: Positive for headaches.       Objective:       Vitals:    08/06/22 1553   BP: (!) 121/56   Pulse: 79   Resp: 16   Temp: 97.4 °F (36.3 °C)   TempSrc: Tympanic   SpO2: 98%   Weight: 76.2 kg (168 lb)   Height: 5' 4" (1.626 m)       Physical Exam   Constitutional: She is oriented to person, place, and time. She appears well-developed. She is cooperative.  Non-toxic appearance. She appears ill. No distress. awake  HENT:   Head: Normocephalic and atraumatic. "   Ears:   Right Ear: Hearing, tympanic membrane, external ear and ear canal normal. No middle ear effusion. impacted cerumen  Left Ear: Hearing, tympanic membrane, external ear and ear canal normal.  No middle ear effusion. impacted cerumen  Nose: Mucosal edema and congestion present. No purulent discharge. Right sinus exhibits no maxillary sinus tenderness and no frontal sinus tenderness. Left sinus exhibits no maxillary sinus tenderness and no frontal sinus tenderness.   Mouth/Throat: Uvula is midline. Mucous membranes are moist. No posterior oropharyngeal erythema. Oropharynx is clear.   Eyes: Right eye visual fields normal and left eye visual fields normal. Conjunctivae and EOM are normal. Pupils are equal, round, and reactive to light. Right eye exhibits discharge. Left eye exhibits discharge. No scleral icterus. Right eye exhibits no nystagmus. Left eye exhibits no nystagmus. Extraocular movement intact vision grossly intact gaze aligned appropriately periorbital hyperpigmentation      Comments: Watery eyes bilateral   Neck: Trachea normal. Neck supple. No Brudzinski's sign and no Kernig's sign noted.   Cardiovascular: Normal rate, regular rhythm, normal heart sounds and normal pulses.   Pulmonary/Chest: Effort normal and breath sounds normal. No accessory muscle usage or stridor. No respiratory distress. She has no wheezes. She has no rales. She exhibits no tenderness.    (sounds congested, nasally)         Comments:  (sounds congested, nasally)    Abdominal: Bowel sounds are normal. She exhibits no distension. Soft. There is no guarding.   Musculoskeletal:      Right lower leg: No edema.      Left lower leg: No edema.   Lymphadenopathy:     She has no cervical adenopathy.   Neurological: She is alert, oriented to person, place, and time and at baseline.   Skin: Skin is warm, dry, not diaphoretic and no rash. Capillary refill takes less than 2 seconds.   Psychiatric: She experiences Normal attention and  Normal perception. Her speech is normal and behavior is normal. Mood, memory, affect, judgment and thought content normal. Cognition normal  Nursing note and vitals reviewed.        Assessment:       1. Chronic pansinusitis    2. Sinus pressure    3. Sinus headache    4. Sinus congestion        Results for orders placed or performed in visit on 08/06/22   POCT COVID-19 Rapid Screening   Result Value Ref Range    POC Rapid COVID Negative Negative     Acceptable Yes        Plan:         Chronic pansinusitis  -     dexamethasone injection 5 mg    Sinus pressure  -     POCT COVID-19 Rapid Screening    Sinus headache    Sinus congestion           Medical Decision Making:   Urgent Care Management:  STEROID STEWARDSHIP:   Discussed risks versus benefits of steroid shot.    Explained that there is a risk of temporary decreased immune system and temporary elevation of blood pressure/blood sugar.    Patient elected to proceed with steroid shot at this time.       Patient Instructions   VIRAL URI: OVER THE COUNTER RECOMMENDATIONS/SUGGESTIONS--if needed      You can also take a daily anti-histamine such as Zyrtec, Claritin, Xyzal, OR Allegra-IN DAYTIME; NON DROWSY) AND/OR Benadryl- AT NIGHT; DROWSY) to help with runny nose/sneezing/sore throat/cough. Remember to switch antihistamines every 3 months, if taken daily.     COUGH:      Make sure you are getting rest and drinking lots of fluids.    You can use cough drops (recommend ricola lemon mint honey) or Cepacol to soothe your sore throat.     You can also take Elderberry and/or Emergen-C (vitamin C) to help boost your immune system.     You may use any of the over-the-counter cough suppressant combination medications such as: NyQuil, DayQuil, Mucinex (guaifenesin), Robitussin, Delsym (Bromfed), TheraFlu  Note:   -pseudoephedrine (behind the counter) is a decongestant. Pseudoephedrine 30 mg up to 240 mg/day. *BE AWARE- It can raise your blood pressure and give  you palpitations.  -Mucinex (guaifenisin) is to break up mucus up to 2400mg/day to loosen any mucous.   -Mucinex DM pill has a cough suppressant that can be sedating. It can be used at night to stop the tickle at the back of your throat.  -Mucinex D (it has guaifenesin and a high dose of pseudoephedrine) which could be helpful in the mornings to help decongest.  -Nyquil at night is beneficial to help you get some rest, however it is sedating and it does have an antihistamine and tylenol.  -- you may use over-the-counter Coricidin HBP in the event that you have a history of high blood pressure    Honey is a natural cough suppressant that can be used.    If your symptoms do not improve, you should return to this clinic. If your symptoms worsen, go to the emergency room.         CONGESTION:  Make sure to stay well hydrated.    Use Nasal Saline to mechanically move any post nasal drip from your eustachian tube or from the back of your throat.    You may insert a whole garlic cloves into your nostrils and leave for 10-15 minutes. When you remove them, mucus will be pulled down. This may burn as garlic is strong.  Repeat as often as needed and able to tolerate.  Please do not use garlic if you have an allergy to garlic.      PAIN/DISCOMFORT:  Tylenol up to 4,000 mg a day is safe for short periods and can be used for headache, body aches, pain, and fever. However in high doses and prolonged use it can cause liver irritation.    Ibuprofen is a non-steroidal anti-inflammatory that can be used for headache, body aches, pain, and fever. However it can also cause stomach irritation if over used.      - You must understand that you have received an Urgent Care treatment only and that you may be released before all of your medical problems are known or treated.   - You, the patient, will arrange for follow up care as instructed with your primary care provider or recommended specialist.   - If your condition worsens or fails to  improve we recommend that you receive another evaluation at the ER immediately or contact your PCP to discuss your concerns, or return here.   - Please do not drive or make any important decisions for 24 hours if you have received any pain medications, sedatives or mood altering drugs during your visit.    Disclaimer: This document was drafted with the use of a voice recognition device and is likely to have sound alike errors.

## 2022-08-06 NOTE — PATIENT INSTRUCTIONS
VIRAL URI: OVER THE COUNTER RECOMMENDATIONS/SUGGESTIONS--if needed      You can also take a daily anti-histamine such as Zyrtec, Claritin, Xyzal, OR Allegra-IN DAYTIME; NON DROWSY) AND/OR Benadryl- AT NIGHT; DROWSY) to help with runny nose/sneezing/sore throat/cough. Remember to switch antihistamines every 3 months, if taken daily.     COUGH:      Make sure you are getting rest and drinking lots of fluids.    You can use cough drops (recommend ricola lemon mint honey) or Cepacol to soothe your sore throat.     You can also take Elderberry and/or Emergen-C (vitamin C) to help boost your immune system.     You may use any of the over-the-counter cough suppressant combination medications such as: NyQuil, DayQuil, Mucinex (guaifenesin), Robitussin, Delsym (Bromfed), TheraFlu  Note:   -pseudoephedrine (behind the counter) is a decongestant. Pseudoephedrine 30 mg up to 240 mg/day. *BE AWARE- It can raise your blood pressure and give you palpitations.  -Mucinex (guaifenisin) is to break up mucus up to 2400mg/day to loosen any mucous.   -Mucinex DM pill has a cough suppressant that can be sedating. It can be used at night to stop the tickle at the back of your throat.  -Mucinex D (it has guaifenesin and a high dose of pseudoephedrine) which could be helpful in the mornings to help decongest.  -Nyquil at night is beneficial to help you get some rest, however it is sedating and it does have an antihistamine and tylenol.  -- you may use over-the-counter Coricidin HBP in the event that you have a history of high blood pressure    Honey is a natural cough suppressant that can be used.    If your symptoms do not improve, you should return to this clinic. If your symptoms worsen, go to the emergency room.         CONGESTION:  Make sure to stay well hydrated.    Use Nasal Saline to mechanically move any post nasal drip from your eustachian tube or from the back of your throat.    You may insert a whole garlic cloves into your  nostrils and leave for 10-15 minutes. When you remove them, mucus will be pulled down. This may burn as garlic is strong.  Repeat as often as needed and able to tolerate.  Please do not use garlic if you have an allergy to garlic.    ASTELIN  AS DIRECTED--    How do you use a Nasal Spray?    Make sure you understand your dosing instructions. Spray only the number of prescribed sprays in each nostril. Read the package instructions before using your spray the first time.    Most sprays suggest the following steps:    Wash your hands well.    Gently blow your nose to clear the passageway.    Shake the container several times.    Tilt your head slightly downward.  Use the opposite hand from the nostril you will be spraying to hold the spray bottle.    Block one nostril with your finger.  Insert the nasal applicator into the other nostril.    Aim the spray toward the outer wall of the nostril.  Inhale slowly through the nose and press the .    Breathe out and repeat to apply the prescribed number of sprays.  Repeat these steps for the other nostril.     Avoid sneezing or blowing your nose right after spraying.         PAIN/DISCOMFORT:  Tylenol up to 4,000 mg a day is safe for short periods and can be used for headache, body aches, pain, and fever. However in high doses and prolonged use it can cause liver irritation.    Ibuprofen is a non-steroidal anti-inflammatory that can be used for headache, body aches, pain, and fever. However it can also cause stomach irritation if over used.      - You must understand that you have received an Urgent Care treatment only and that you may be released before all of your medical problems are known or treated.   - You, the patient, will arrange for follow up care as instructed with your primary care provider or recommended specialist.   - If your condition worsens or fails to improve we recommend that you receive another evaluation at the ER immediately or contact your  PCP to discuss your concerns, or return here.   - Please do not drive or make any important decisions for 24 hours if you have received any pain medications, sedatives or mood altering drugs during your visit.    Disclaimer: This document was drafted with the use of a voice recognition device and is likely to have sound alike errors.

## 2022-08-09 ENCOUNTER — TELEPHONE (OUTPATIENT)
Dept: URGENT CARE | Facility: CLINIC | Age: 62
End: 2022-08-09
Payer: COMMERCIAL

## 2022-08-15 ENCOUNTER — OFFICE VISIT (OUTPATIENT)
Dept: INTERNAL MEDICINE | Facility: CLINIC | Age: 62
End: 2022-08-15
Payer: COMMERCIAL

## 2022-08-15 VITALS
DIASTOLIC BLOOD PRESSURE: 64 MMHG | TEMPERATURE: 98 F | BODY MASS INDEX: 29.02 KG/M2 | WEIGHT: 170 LBS | HEIGHT: 64 IN | HEART RATE: 76 BPM | SYSTOLIC BLOOD PRESSURE: 100 MMHG

## 2022-08-15 DIAGNOSIS — R73.09 ABNORMAL GLUCOSE: Primary | ICD-10-CM

## 2022-08-15 DIAGNOSIS — E78.5 HYPERLIPIDEMIA, UNSPECIFIED HYPERLIPIDEMIA TYPE: ICD-10-CM

## 2022-08-15 DIAGNOSIS — Z00.00 ANNUAL PHYSICAL EXAM: ICD-10-CM

## 2022-08-15 DIAGNOSIS — Z12.31 ENCOUNTER FOR SCREENING MAMMOGRAM FOR MALIGNANT NEOPLASM OF BREAST: ICD-10-CM

## 2022-08-15 PROCEDURE — 1159F MED LIST DOCD IN RCRD: CPT | Mod: CPTII,S$GLB,, | Performed by: FAMILY MEDICINE

## 2022-08-15 PROCEDURE — 99999 PR PBB SHADOW E&M-EST. PATIENT-LVL III: CPT | Mod: PBBFAC,,, | Performed by: FAMILY MEDICINE

## 2022-08-15 PROCEDURE — 3008F PR BODY MASS INDEX (BMI) DOCUMENTED: ICD-10-PCS | Mod: CPTII,S$GLB,, | Performed by: FAMILY MEDICINE

## 2022-08-15 PROCEDURE — 3078F PR MOST RECENT DIASTOLIC BLOOD PRESSURE < 80 MM HG: ICD-10-PCS | Mod: CPTII,S$GLB,, | Performed by: FAMILY MEDICINE

## 2022-08-15 PROCEDURE — 3044F PR MOST RECENT HEMOGLOBIN A1C LEVEL <7.0%: ICD-10-PCS | Mod: CPTII,S$GLB,, | Performed by: FAMILY MEDICINE

## 2022-08-15 PROCEDURE — 1160F RVW MEDS BY RX/DR IN RCRD: CPT | Mod: CPTII,S$GLB,, | Performed by: FAMILY MEDICINE

## 2022-08-15 PROCEDURE — 99214 OFFICE O/P EST MOD 30 MIN: CPT | Mod: S$GLB,,, | Performed by: FAMILY MEDICINE

## 2022-08-15 PROCEDURE — 3044F HG A1C LEVEL LT 7.0%: CPT | Mod: CPTII,S$GLB,, | Performed by: FAMILY MEDICINE

## 2022-08-15 PROCEDURE — 3078F DIAST BP <80 MM HG: CPT | Mod: CPTII,S$GLB,, | Performed by: FAMILY MEDICINE

## 2022-08-15 PROCEDURE — 1159F PR MEDICATION LIST DOCUMENTED IN MEDICAL RECORD: ICD-10-PCS | Mod: CPTII,S$GLB,, | Performed by: FAMILY MEDICINE

## 2022-08-15 PROCEDURE — 3074F SYST BP LT 130 MM HG: CPT | Mod: CPTII,S$GLB,, | Performed by: FAMILY MEDICINE

## 2022-08-15 PROCEDURE — 3074F PR MOST RECENT SYSTOLIC BLOOD PRESSURE < 130 MM HG: ICD-10-PCS | Mod: CPTII,S$GLB,, | Performed by: FAMILY MEDICINE

## 2022-08-15 PROCEDURE — 99214 PR OFFICE/OUTPT VISIT, EST, LEVL IV, 30-39 MIN: ICD-10-PCS | Mod: S$GLB,,, | Performed by: FAMILY MEDICINE

## 2022-08-15 PROCEDURE — 3008F BODY MASS INDEX DOCD: CPT | Mod: CPTII,S$GLB,, | Performed by: FAMILY MEDICINE

## 2022-08-15 PROCEDURE — 1160F PR REVIEW ALL MEDS BY PRESCRIBER/CLIN PHARMACIST DOCUMENTED: ICD-10-PCS | Mod: CPTII,S$GLB,, | Performed by: FAMILY MEDICINE

## 2022-08-15 PROCEDURE — 99999 PR PBB SHADOW E&M-EST. PATIENT-LVL III: ICD-10-PCS | Mod: PBBFAC,,, | Performed by: FAMILY MEDICINE

## 2022-08-15 NOTE — PROGRESS NOTES
"Subjective:      Patient ID: Shirley Amaya is a 62 y.o. female.    Chief Complaint: Hyperlipidemia    HPI 62 y.o.   female patient with a PMHx of abnormal glucose, HLD, OA, and YUDITH on CPAP presents to clinic for hyperlipidemia. The patient was last seen on January 10, 2022      Today she reports she is doing well. Patient reports she is taking her metformin twice a day. She states since having her colonoscopy her bowels have been moving regular. Patient otherwise without complaints. Denies SOB, chest pain, and bowel changes.  Staying active//did eat a good bit of sugar in the last few mos when she did her lab draw.  Back on track now.  Daughter had gotten     Past Medical History:   Diagnosis Date    Abnormal glucose     Hyperlipidemia     OA (osteoarthritis) of knee     YUDITH on CPAP      Family History   Problem Relation Age of Onset    Diabetes Father     Lung cancer Sister     Stomach cancer Brother     Heart disease Neg Hx     Hypertension Neg Hx     Colon cancer Neg Hx     Breast cancer Neg Hx     Ovarian cancer Neg Hx      Past Surgical History:   Procedure Laterality Date    CERVICAL CONIZATION   W/ LASER      COLONOSCOPY N/A 2016    Procedure: COLONOSCOPY;  Surgeon: Mary Victor MD;  Location: [facility] ENDO;  Service: Endoscopy;  Laterality: N/A;    COLONOSCOPY N/A 2022    Procedure: COLONOSCOPY;  Surgeon: Andrae Barger MD;  Location: [facility] ENDO;  Service: Endoscopy;  Laterality: N/A;     Social History     Tobacco Use    Smoking status: Former Smoker     Packs/day: 2.00     Years: 25.00     Pack years: 50.00     Quit date: 2000     Years since quittin.3    Smokeless tobacco: Never Used   Substance Use Topics    Alcohol use: No    Drug use: No       /64   Pulse 76   Temp 98.4 °F (36.9 °C) (Temporal)   Ht 5' 4" (1.626 m)   Wt 77.1 kg (169 lb 15.6 oz)   LMP  (LMP Unknown)   BMI 29.18 kg/m²     Review of Systems   Constitutional: " Negative for activity change, appetite change, chills, diaphoresis, fatigue, fever and unexpected weight change.   HENT: Negative for congestion, ear pain, hearing loss, postnasal drip, rhinorrhea, sinus pain and sore throat.    Eyes: Negative for pain, discharge, itching and visual disturbance.   Respiratory: Negative for cough, chest tightness, shortness of breath and wheezing.    Cardiovascular: Negative for chest pain, palpitations and leg swelling.   Gastrointestinal: Negative for abdominal pain, constipation, diarrhea, nausea and vomiting.   Endocrine: Negative for polydipsia and polyuria.   Genitourinary: Negative for difficulty urinating, dysuria, flank pain, frequency, menstrual problem, pelvic pain and urgency.   Musculoskeletal: Negative for arthralgias, back pain, joint swelling, myalgias and neck pain.   Skin: Negative for color change and rash.   Neurological: Negative for dizziness, weakness, light-headedness and headaches.   Hematological: Negative for adenopathy.   Psychiatric/Behavioral: Negative for confusion, decreased concentration and dysphoric mood.       Objective:     Physical Exam  Constitutional:       General: She is not in acute distress.     Appearance: She is well-developed. She is not diaphoretic.   HENT:      Head: Normocephalic.   Eyes:      Conjunctiva/sclera: Conjunctivae normal.   Cardiovascular:      Rate and Rhythm: Normal rate and regular rhythm.   Pulmonary:      Effort: Pulmonary effort is normal. No tachypnea, accessory muscle usage or respiratory distress.   Musculoskeletal:      Cervical back: Neck supple.   Skin:     Coloration: Skin is not pale.   Neurological:      Mental Status: She is alert and oriented to person, place, and time.   Psychiatric:         Behavior: Behavior normal.         Thought Content: Thought content normal.         Judgment: Judgment normal.         Lab Results   Component Value Date    WBC 4.51 01/03/2022    HGB 11.2 (L) 01/03/2022    HCT 34.7  (L) 01/03/2022     01/03/2022    CHOL 165 01/03/2022    TRIG 107 01/03/2022    HDL 55 01/03/2022    ALT 11 01/03/2022    AST 14 01/03/2022     01/03/2022    K 4.3 01/03/2022     01/03/2022    CREATININE 0.6 01/03/2022    BUN 12 01/03/2022    CO2 30 (H) 01/03/2022    TSH 1.484 01/03/2022    HGBA1C 5.9 (H) 07/05/2022       Assessment:     1. Abnormal glucose    2. Hyperlipidemia, unspecified hyperlipidemia type    3. Encounter for screening mammogram for malignant neoplasm of breast    4. Annual physical exam         Plan:     Abnormal glucose    Hyperlipidemia, unspecified hyperlipidemia type    Encounter for screening mammogram for malignant neoplasm of breast  -     Mammo Digital Screening Bilat w/ Rich; Future; Expected date: 08/15/2022    Annual physical exam  -     CBC Auto Differential; Future; Expected date: 02/15/2023  -     Comprehensive Metabolic Panel; Future; Expected date: 02/15/2023  -     Hemoglobin A1C; Future; Expected date: 02/15/2023  -     Lipid Panel; Future; Expected date: 02/15/2023  -     TSH; Future; Expected date: 02/15/2023  -     Microalbumin/Creatinine Ratio, Urine; Future; Expected date: 02/15/2023        Vitals reviewed. BP within normal range at 100/64.    Labs reviewed and discussed. A1C elevated at 5.9, goal is to stay under 6.  Orders placed for mammo  Patient overall doing well. No changes to current regimen.  Questions and concerns addressed.          Documentation entered by Kassi Farris, acting as scribe for Dr. Milton Disla. 08/15/2022 7:40 AM.

## 2022-08-22 ENCOUNTER — HOSPITAL ENCOUNTER (OUTPATIENT)
Dept: RADIOLOGY | Facility: HOSPITAL | Age: 62
Discharge: HOME | End: 2022-08-22
Attending: FAMILY MEDICINE
Payer: COMMERCIAL

## 2022-08-22 DIAGNOSIS — Z12.31 ENCOUNTER FOR SCREENING MAMMOGRAM FOR MALIGNANT NEOPLASM OF BREAST: ICD-10-CM

## 2022-08-22 PROCEDURE — 77063 MAMMO DIGITAL SCREENING BILAT WITH TOMO: ICD-10-PCS | Mod: 26,,, | Performed by: RADIOLOGY

## 2022-08-22 PROCEDURE — 77063 BREAST TOMOSYNTHESIS BI: CPT | Mod: TC,PN

## 2022-08-22 PROCEDURE — 77067 SCR MAMMO BI INCL CAD: CPT | Mod: 26,,, | Performed by: RADIOLOGY

## 2022-08-22 PROCEDURE — 77067 MAMMO DIGITAL SCREENING BILAT WITH TOMO: ICD-10-PCS | Mod: 26,,, | Performed by: RADIOLOGY

## 2022-08-22 PROCEDURE — 77063 BREAST TOMOSYNTHESIS BI: CPT | Mod: 26,,, | Performed by: RADIOLOGY

## 2022-08-22 PROCEDURE — 77067 SCR MAMMO BI INCL CAD: CPT | Mod: TC,PN

## 2022-08-31 ENCOUNTER — OFFICE VISIT (OUTPATIENT)
Dept: INTERNAL MEDICINE | Facility: CLINIC | Age: 62
End: 2022-08-31
Payer: COMMERCIAL

## 2022-08-31 VITALS
DIASTOLIC BLOOD PRESSURE: 78 MMHG | BODY MASS INDEX: 29.14 KG/M2 | HEART RATE: 86 BPM | OXYGEN SATURATION: 98 % | SYSTOLIC BLOOD PRESSURE: 150 MMHG | WEIGHT: 169.75 LBS | TEMPERATURE: 98 F

## 2022-08-31 DIAGNOSIS — R03.0 ELEVATED BLOOD PRESSURE READING IN OFFICE WITHOUT DIAGNOSIS OF HYPERTENSION: ICD-10-CM

## 2022-08-31 DIAGNOSIS — R09.81 CHRONIC NASAL CONGESTION: Primary | ICD-10-CM

## 2022-08-31 PROCEDURE — 1159F PR MEDICATION LIST DOCUMENTED IN MEDICAL RECORD: ICD-10-PCS | Mod: CPTII,S$GLB,, | Performed by: NURSE PRACTITIONER

## 2022-08-31 PROCEDURE — 3008F BODY MASS INDEX DOCD: CPT | Mod: CPTII,S$GLB,, | Performed by: NURSE PRACTITIONER

## 2022-08-31 PROCEDURE — 99999 PR PBB SHADOW E&M-EST. PATIENT-LVL IV: CPT | Mod: PBBFAC,,, | Performed by: NURSE PRACTITIONER

## 2022-08-31 PROCEDURE — 1159F MED LIST DOCD IN RCRD: CPT | Mod: CPTII,S$GLB,, | Performed by: NURSE PRACTITIONER

## 2022-08-31 PROCEDURE — 3078F DIAST BP <80 MM HG: CPT | Mod: CPTII,S$GLB,, | Performed by: NURSE PRACTITIONER

## 2022-08-31 PROCEDURE — 3077F PR MOST RECENT SYSTOLIC BLOOD PRESSURE >= 140 MM HG: ICD-10-PCS | Mod: CPTII,S$GLB,, | Performed by: NURSE PRACTITIONER

## 2022-08-31 PROCEDURE — 1160F PR REVIEW ALL MEDS BY PRESCRIBER/CLIN PHARMACIST DOCUMENTED: ICD-10-PCS | Mod: CPTII,S$GLB,, | Performed by: NURSE PRACTITIONER

## 2022-08-31 PROCEDURE — 3008F PR BODY MASS INDEX (BMI) DOCUMENTED: ICD-10-PCS | Mod: CPTII,S$GLB,, | Performed by: NURSE PRACTITIONER

## 2022-08-31 PROCEDURE — 3044F PR MOST RECENT HEMOGLOBIN A1C LEVEL <7.0%: ICD-10-PCS | Mod: CPTII,S$GLB,, | Performed by: NURSE PRACTITIONER

## 2022-08-31 PROCEDURE — 3044F HG A1C LEVEL LT 7.0%: CPT | Mod: CPTII,S$GLB,, | Performed by: NURSE PRACTITIONER

## 2022-08-31 PROCEDURE — 1160F RVW MEDS BY RX/DR IN RCRD: CPT | Mod: CPTII,S$GLB,, | Performed by: NURSE PRACTITIONER

## 2022-08-31 PROCEDURE — 99214 OFFICE O/P EST MOD 30 MIN: CPT | Mod: S$GLB,,, | Performed by: NURSE PRACTITIONER

## 2022-08-31 PROCEDURE — 99999 PR PBB SHADOW E&M-EST. PATIENT-LVL IV: ICD-10-PCS | Mod: PBBFAC,,, | Performed by: NURSE PRACTITIONER

## 2022-08-31 PROCEDURE — 3078F PR MOST RECENT DIASTOLIC BLOOD PRESSURE < 80 MM HG: ICD-10-PCS | Mod: CPTII,S$GLB,, | Performed by: NURSE PRACTITIONER

## 2022-08-31 PROCEDURE — 99214 PR OFFICE/OUTPT VISIT, EST, LEVL IV, 30-39 MIN: ICD-10-PCS | Mod: S$GLB,,, | Performed by: NURSE PRACTITIONER

## 2022-08-31 PROCEDURE — 3077F SYST BP >= 140 MM HG: CPT | Mod: CPTII,S$GLB,, | Performed by: NURSE PRACTITIONER

## 2022-08-31 NOTE — PROGRESS NOTES
Subjective:       Patient ID: Shirley Amaya is a 62 y.o. female.    Chief Complaint: Sinus Problem    Pt presents to clinic today chronic nasal congestion  Had Covid about 1 month ago and has been suffering with nasal congestion since  She has had a round of antibiotics in July, steroid shot beginning of Aug  Pt taking sudafed, clartin and using afrin nasal spray to help with symptoms  No fever, cough or trouble breathing  Hard to breath through her nose    BP elevated today- likely due to sudafed use  Educated on cold meds okay to take to prevent HBP      BP (!) 150/78   Pulse 86   Temp 97.7 °F (36.5 °C)   Wt 77 kg (169 lb 12.1 oz)   LMP  (LMP Unknown)   SpO2 98%   BMI 29.14 kg/m²     Review of Systems   Constitutional:  Negative for activity change, appetite change, chills, diaphoresis, fatigue, fever and unexpected weight change.   HENT:  Positive for congestion, rhinorrhea and sneezing. Negative for dental problem, drooling, ear discharge, ear pain, hearing loss, mouth sores, nosebleeds, postnasal drip, sinus pressure, sore throat, tinnitus, trouble swallowing and voice change.    Eyes:  Negative for pain, discharge and redness.   Respiratory:  Positive for cough. Negative for choking, chest tightness, shortness of breath and wheezing.    Cardiovascular:  Negative for chest pain, palpitations and leg swelling.   Gastrointestinal:  Negative for abdominal pain, diarrhea, nausea and vomiting.   Endocrine: Negative for cold intolerance and heat intolerance.   Genitourinary:  Negative for dysuria.   Musculoskeletal:  Negative for arthralgias, joint swelling, myalgias and neck pain.   Skin:  Negative for rash.   Allergic/Immunologic: Positive for environmental allergies. Negative for food allergies and immunocompromised state.   Neurological:  Negative for syncope, light-headedness and headaches.     Objective:      Physical Exam  Vitals and nursing note reviewed.   Constitutional:       Appearance: She  is well-developed. She is not diaphoretic.   HENT:      Head: Normocephalic and atraumatic.      Right Ear: Tympanic membrane, ear canal and external ear normal.      Left Ear: Tympanic membrane, ear canal and external ear normal.      Nose: Mucosal edema, congestion and rhinorrhea present.      Right Turbinates: Enlarged and swollen.      Left Turbinates: Enlarged and swollen.      Right Sinus: No maxillary sinus tenderness or frontal sinus tenderness.      Left Sinus: No maxillary sinus tenderness or frontal sinus tenderness.      Mouth/Throat:      Pharynx: Uvula midline. No oropharyngeal exudate or posterior oropharyngeal erythema.   Eyes:      General:         Right eye: No discharge.         Left eye: No discharge.      Conjunctiva/sclera: Conjunctivae normal.   Cardiovascular:      Rate and Rhythm: Normal rate and regular rhythm.      Heart sounds: Normal heart sounds. No murmur heard.  Pulmonary:      Effort: Pulmonary effort is normal. No accessory muscle usage or respiratory distress.      Breath sounds: Normal breath sounds. No decreased breath sounds, wheezing, rhonchi or rales.   Chest:      Chest wall: No tenderness.   Abdominal:      General: There is no distension.      Palpations: Abdomen is soft.   Musculoskeletal:         General: Normal range of motion.      Cervical back: Normal range of motion.   Skin:     General: Skin is warm and dry.   Neurological:      Mental Status: She is alert and oriented to person, place, and time.   Psychiatric:         Behavior: Behavior normal.       Assessment:       1. Chronic nasal congestion    2. Elevated blood pressure reading in office without diagnosis of hypertension    3. BMI 29.0-29.9,adult          Plan:       Shirley was seen today for sinus problem.    Diagnoses and all orders for this visit:    Chronic nasal congestion  -     Ambulatory referral/consult to ENT; Future  - Counseled pt against afrin use. Advised flonase or astelin as needed  - Will  refer to ENT for further managment    Elevated blood pressure reading in office without diagnosis of hypertension        - Likely due to sudafed use. Follow up with Dr. Monzon in 1 month to recheck pressure    BMI 29.0-29.9,adult    Follow up for worsening or no improvement in symptoms and PRN.

## 2022-09-06 ENCOUNTER — OFFICE VISIT (OUTPATIENT)
Dept: OTOLARYNGOLOGY | Facility: CLINIC | Age: 62
End: 2022-09-06
Payer: COMMERCIAL

## 2022-09-06 VITALS — WEIGHT: 170 LBS | BODY MASS INDEX: 29.18 KG/M2 | TEMPERATURE: 98 F

## 2022-09-06 DIAGNOSIS — J31.0 RHINITIS MEDICAMENTOSA: Primary | ICD-10-CM

## 2022-09-06 DIAGNOSIS — J34.2 NASAL SEPTAL DEVIATION: ICD-10-CM

## 2022-09-06 DIAGNOSIS — J34.3 HYPERTROPHY OF INFERIOR NASAL TURBINATE: ICD-10-CM

## 2022-09-06 DIAGNOSIS — T48.5X5A RHINITIS MEDICAMENTOSA: Primary | ICD-10-CM

## 2022-09-06 DIAGNOSIS — R09.81 CHRONIC NASAL CONGESTION: ICD-10-CM

## 2022-09-06 PROCEDURE — 99203 PR OFFICE/OUTPT VISIT, NEW, LEVL III, 30-44 MIN: ICD-10-PCS | Mod: S$GLB,,, | Performed by: OTOLARYNGOLOGY

## 2022-09-06 PROCEDURE — 3008F BODY MASS INDEX DOCD: CPT | Mod: CPTII,S$GLB,, | Performed by: OTOLARYNGOLOGY

## 2022-09-06 PROCEDURE — 3044F HG A1C LEVEL LT 7.0%: CPT | Mod: CPTII,S$GLB,, | Performed by: OTOLARYNGOLOGY

## 2022-09-06 PROCEDURE — 1159F PR MEDICATION LIST DOCUMENTED IN MEDICAL RECORD: ICD-10-PCS | Mod: CPTII,S$GLB,, | Performed by: OTOLARYNGOLOGY

## 2022-09-06 PROCEDURE — 3044F PR MOST RECENT HEMOGLOBIN A1C LEVEL <7.0%: ICD-10-PCS | Mod: CPTII,S$GLB,, | Performed by: OTOLARYNGOLOGY

## 2022-09-06 PROCEDURE — 99999 PR PBB SHADOW E&M-EST. PATIENT-LVL III: CPT | Mod: PBBFAC,,, | Performed by: OTOLARYNGOLOGY

## 2022-09-06 PROCEDURE — 99999 PR PBB SHADOW E&M-EST. PATIENT-LVL III: ICD-10-PCS | Mod: PBBFAC,,, | Performed by: OTOLARYNGOLOGY

## 2022-09-06 PROCEDURE — 99203 OFFICE O/P NEW LOW 30 MIN: CPT | Mod: S$GLB,,, | Performed by: OTOLARYNGOLOGY

## 2022-09-06 PROCEDURE — 1159F MED LIST DOCD IN RCRD: CPT | Mod: CPTII,S$GLB,, | Performed by: OTOLARYNGOLOGY

## 2022-09-06 PROCEDURE — 3008F PR BODY MASS INDEX (BMI) DOCUMENTED: ICD-10-PCS | Mod: CPTII,S$GLB,, | Performed by: OTOLARYNGOLOGY

## 2022-09-06 NOTE — PROGRESS NOTES
Referring Provider:    Brandan Rojo Np  [id number] [place] [id number]  Subjective:   Patient: Shirley Amaya [id number], :1960   Visit date:2022 8:46 AM    Chief Complaint:  chronic nasal congestion (Pt states  had covid 2 months ago. She was never tested. Thought she had sinus infection. Has been to Urgent care x3. C/o nasal congestion. C/o feeling heaviness in chest)    HPI:    Prior notes reviewed by myself.  Clinical documentation obtained by nursing staff reviewed.     62-year-old female presents for evaluation of left greater than right nasal congestion.  Her  had COVID roughly 8 weeks ago she had many of the same symptoms but was never tested.  During this time.  She began using topical nasal decongestants like Afrin.  She developed what sounds like a rhinitis medicamentosa.  Her primary care physician instructed her to discontinue the Afrin and since that time her nasal congestion has improved but not resolved.  She was started on Astelin which she used a few times but felt as if it did not work.  She was also using Flonase but also not using that consistently.      Objective:     Physical Exam:  Vitals:  Temp 97.7 °F (36.5 °C) (Temporal)   Wt 77.1 kg (169 lb 15.6 oz)   LMP  (LMP Unknown)   BMI 29.18 kg/m²   General appearance:  Well developed, well nourished    Ears:  Otoscopy of external auditory canals and tympanic membranes was normal, clinical speech reception thresholds grossly intact, no mass/lesion of auricle.    Nose:  No masses/lesions of external nose, nasal mucosa, septum deviated left 2+, and turbinates were hypertrophied 3+.    Mouth:  No mass/lesion of lips, teeth, gums, hard/soft palate, tongue, tonsils, or oropharynx.    Neck & Lymphatics:  No cervical lymphadenopathy, no neck mass/crepitus/ asymmetry, trachea is midline, no thyroid enlargement/tenderness/mass.        [x]  Data Reviewed:    Lab Results   Component Value Date    WBC 4.51  01/03/2022    HGB 11.2 (L) 01/03/2022    HCT 34.7 (L) 01/03/2022    MCV 92 01/03/2022    EOSINOPHIL 2.0 01/03/2022               Assessment & Plan:   Rhinitis medicamentosa    Chronic nasal congestion  -     Ambulatory referral/consult to ENT    Hypertrophy of inferior nasal turbinate    Nasal septal deviation      We discussed the pathophysiology behind rhinitis medicamentosa.  She understands that she should not restart her topical nasal decongestants.  I recommended that she start using Flonase consistently.  We did discuss briefly the options for surgical correction of her septal deviation and turbinate hypertrophy.  She is going to try that topical nasal steroids and schedule a follow-up appointment as needed.    I had a long discussion with the patient regarding the diagnosis of rhinitis medicamentosa.  We discussed the physiologic response of the nasal mucosa to the OTC nasal sprays, and that rhinitis medicamentosa is a condition of rebound nasal congestion as a result of extended use of topical decongestants that constrict blood vessels in the lining of the nose.  The patient expressed understand, and is ready to try and stop the use of topical nasal decongestants.  We discussed different options including the use of oral steroids to prevent rebound nasal congestion as well as serially diluting the current bottle of nasal decongestant with saline over the next two weeks to gradually wean from the medicine.

## 2022-09-07 ENCOUNTER — PATIENT MESSAGE (OUTPATIENT)
Dept: OTOLARYNGOLOGY | Facility: CLINIC | Age: 62
End: 2022-09-07
Payer: COMMERCIAL

## 2022-09-08 DIAGNOSIS — T48.5X5A RHINITIS MEDICAMENTOSA: Primary | ICD-10-CM

## 2022-09-08 DIAGNOSIS — J31.0 RHINITIS MEDICAMENTOSA: Primary | ICD-10-CM

## 2022-09-08 RX ORDER — FLUTICASONE PROPIONATE 50 MCG
1 SPRAY, SUSPENSION (ML) NASAL DAILY
Qty: 15.8 ML | Refills: 0 | Status: SHIPPED | OUTPATIENT
Start: 2022-09-08 | End: 2024-02-19

## 2022-12-03 ENCOUNTER — OFFICE VISIT (OUTPATIENT)
Dept: URGENT CARE | Facility: CLINIC | Age: 62
End: 2022-12-03
Payer: COMMERCIAL

## 2022-12-03 VITALS
BODY MASS INDEX: 29.37 KG/M2 | DIASTOLIC BLOOD PRESSURE: 69 MMHG | WEIGHT: 172 LBS | RESPIRATION RATE: 16 BRPM | OXYGEN SATURATION: 96 % | SYSTOLIC BLOOD PRESSURE: 131 MMHG | HEIGHT: 64 IN | TEMPERATURE: 98 F | HEART RATE: 67 BPM

## 2022-12-03 DIAGNOSIS — J02.0 STREP THROAT: Primary | ICD-10-CM

## 2022-12-03 DIAGNOSIS — J02.9 SORE THROAT: ICD-10-CM

## 2022-12-03 LAB
CTP QC/QA: YES
MOLECULAR STREP A: NEGATIVE

## 2022-12-03 PROCEDURE — 3075F SYST BP GE 130 - 139MM HG: CPT | Mod: CPTII,S$GLB,, | Performed by: PHYSICIAN ASSISTANT

## 2022-12-03 PROCEDURE — 3008F PR BODY MASS INDEX (BMI) DOCUMENTED: ICD-10-PCS | Mod: CPTII,S$GLB,, | Performed by: PHYSICIAN ASSISTANT

## 2022-12-03 PROCEDURE — 3008F BODY MASS INDEX DOCD: CPT | Mod: CPTII,S$GLB,, | Performed by: PHYSICIAN ASSISTANT

## 2022-12-03 PROCEDURE — 99214 PR OFFICE/OUTPT VISIT, EST, LEVL IV, 30-39 MIN: ICD-10-PCS | Mod: S$GLB,,, | Performed by: PHYSICIAN ASSISTANT

## 2022-12-03 PROCEDURE — 1159F MED LIST DOCD IN RCRD: CPT | Mod: CPTII,S$GLB,, | Performed by: PHYSICIAN ASSISTANT

## 2022-12-03 PROCEDURE — 3078F PR MOST RECENT DIASTOLIC BLOOD PRESSURE < 80 MM HG: ICD-10-PCS | Mod: CPTII,S$GLB,, | Performed by: PHYSICIAN ASSISTANT

## 2022-12-03 PROCEDURE — 99214 OFFICE O/P EST MOD 30 MIN: CPT | Mod: S$GLB,,, | Performed by: PHYSICIAN ASSISTANT

## 2022-12-03 PROCEDURE — 1160F PR REVIEW ALL MEDS BY PRESCRIBER/CLIN PHARMACIST DOCUMENTED: ICD-10-PCS | Mod: CPTII,S$GLB,, | Performed by: PHYSICIAN ASSISTANT

## 2022-12-03 PROCEDURE — 1160F RVW MEDS BY RX/DR IN RCRD: CPT | Mod: CPTII,S$GLB,, | Performed by: PHYSICIAN ASSISTANT

## 2022-12-03 PROCEDURE — 3044F HG A1C LEVEL LT 7.0%: CPT | Mod: CPTII,S$GLB,, | Performed by: PHYSICIAN ASSISTANT

## 2022-12-03 PROCEDURE — 3044F PR MOST RECENT HEMOGLOBIN A1C LEVEL <7.0%: ICD-10-PCS | Mod: CPTII,S$GLB,, | Performed by: PHYSICIAN ASSISTANT

## 2022-12-03 PROCEDURE — 3078F DIAST BP <80 MM HG: CPT | Mod: CPTII,S$GLB,, | Performed by: PHYSICIAN ASSISTANT

## 2022-12-03 PROCEDURE — 1159F PR MEDICATION LIST DOCUMENTED IN MEDICAL RECORD: ICD-10-PCS | Mod: CPTII,S$GLB,, | Performed by: PHYSICIAN ASSISTANT

## 2022-12-03 PROCEDURE — 87651 POCT STREP A MOLECULAR: ICD-10-PCS | Mod: QW,S$GLB,, | Performed by: PHYSICIAN ASSISTANT

## 2022-12-03 PROCEDURE — 3075F PR MOST RECENT SYSTOLIC BLOOD PRESS GE 130-139MM HG: ICD-10-PCS | Mod: CPTII,S$GLB,, | Performed by: PHYSICIAN ASSISTANT

## 2022-12-03 PROCEDURE — 87651 STREP A DNA AMP PROBE: CPT | Mod: QW,S$GLB,, | Performed by: PHYSICIAN ASSISTANT

## 2022-12-03 RX ORDER — PENICILLIN V POTASSIUM 500 MG/1
500 TABLET, FILM COATED ORAL 2 TIMES DAILY
Qty: 20 TABLET | Refills: 0 | Status: SHIPPED | OUTPATIENT
Start: 2022-12-03 | End: 2022-12-13

## 2022-12-03 NOTE — PROGRESS NOTES
"Subjective:       Patient ID: Shirley Amaya is a 62 y.o. female.    Vitals:  height is 5' 4" (1.626 m) and weight is 78 kg (172 lb). Her oral temperature is 98.2 °F (36.8 °C). Her blood pressure is 131/69 and her pulse is 67. Her respiration is 16 and oxygen saturation is 96%.     Chief Complaint: Sore Throat    Pt c/o sore throat with pus starting yesterday.     Sore Throat   This is a new problem. The current episode started yesterday. The problem has been unchanged. There has been no fever. The pain is at a severity of 0/10. The patient is experiencing no pain. Associated symptoms include a hoarse voice and swollen glands. Pertinent negatives include no abdominal pain, congestion, coughing, diarrhea, drooling, ear discharge, ear pain, headaches, plugged ear sensation, neck pain, shortness of breath, stridor, trouble swallowing or vomiting. She has had no exposure to strep or mono. She has tried acetaminophen for the symptoms. The treatment provided no relief.     HENT:  Positive for sore throat. Negative for ear pain, ear discharge, drooling, congestion and trouble swallowing.    Neck: Negative for neck pain.   Respiratory:  Negative for cough, shortness of breath and stridor.    Gastrointestinal:  Negative for abdominal pain, vomiting and diarrhea.   Neurological:  Negative for headaches.     Objective:      Physical Exam   Constitutional: She is oriented to person, place, and time. She appears well-developed. She is cooperative.  Non-toxic appearance. She does not appear ill. No distress.   HENT:   Head: Normocephalic and atraumatic.   Ears:   Right Ear: Hearing, tympanic membrane, external ear and ear canal normal.   Left Ear: Hearing, tympanic membrane, external ear and ear canal normal.   Nose: Nose normal. No mucosal edema, rhinorrhea or nasal deformity. No epistaxis. Right sinus exhibits no maxillary sinus tenderness and no frontal sinus tenderness. Left sinus exhibits no maxillary sinus " tenderness and no frontal sinus tenderness.   Mouth/Throat: Uvula is midline and mucous membranes are normal. No trismus in the jaw. Normal dentition. No uvula swelling. Posterior oropharyngeal erythema present. Tonsils are 2+ on the right. Tonsils are 2+ on the left. Tonsillar exudate.   Eyes: Conjunctivae and lids are normal. Right eye exhibits no discharge. Left eye exhibits no discharge. No scleral icterus.   Neck: Trachea normal and phonation normal. Neck supple.   Cardiovascular: Normal rate, regular rhythm, normal heart sounds and normal pulses.   Pulmonary/Chest: Effort normal and breath sounds normal. No respiratory distress.   Abdominal: Normal appearance and bowel sounds are normal. She exhibits no distension and no mass. Soft. There is no abdominal tenderness.   Musculoskeletal: Normal range of motion.         General: No deformity. Normal range of motion.   Neurological: She is alert and oriented to person, place, and time. She exhibits normal muscle tone. Coordination normal.   Skin: Skin is warm, dry, intact, not diaphoretic and not pale.   Psychiatric: Her speech is normal and behavior is normal. Judgment and thought content normal.   Nursing note and vitals reviewed.      Assessment:       1. Strep throat    2. Sore throat        Results for orders placed or performed in visit on 12/03/22   POCT Strep A, Molecular   Result Value Ref Range    Molecular Strep A, POC Negative Negative     Acceptable Yes        Plan:       Discussed medication being prescribed.  Advised patient to follow up with PCP as needed.  Patient verbalized understanding, agrees with the plan, and is comfortable with discharge.    Strep throat  -     penicillin v potassium (VEETID) 500 MG tablet; Take 1 tablet (500 mg total) by mouth 2 (two) times a day. for 10 days  Dispense: 20 tablet; Refill: 0    Sore throat  -     POCT Strep A, Molecular

## 2022-12-06 ENCOUNTER — TELEPHONE (OUTPATIENT)
Dept: URGENT CARE | Facility: CLINIC | Age: 62
End: 2022-12-06
Payer: COMMERCIAL

## 2023-02-10 ENCOUNTER — LAB VISIT (OUTPATIENT)
Dept: LAB | Facility: HOSPITAL | Age: 63
End: 2023-02-10
Attending: FAMILY MEDICINE
Payer: COMMERCIAL

## 2023-02-10 DIAGNOSIS — Z00.00 ANNUAL PHYSICAL EXAM: ICD-10-CM

## 2023-02-10 LAB
ALBUMIN SERPL BCP-MCNC: 4.2 G/DL (ref 3.5–5.2)
ALP SERPL-CCNC: 57 U/L (ref 55–135)
ALT SERPL W/O P-5'-P-CCNC: 11 U/L (ref 10–44)
ANION GAP SERPL CALC-SCNC: 11 MMOL/L (ref 8–16)
AST SERPL-CCNC: 16 U/L (ref 10–40)
BASOPHILS # BLD AUTO: 0.04 K/UL (ref 0–0.2)
BASOPHILS NFR BLD: 0.7 % (ref 0–1.9)
BILIRUB SERPL-MCNC: 0.9 MG/DL (ref 0.1–1)
BUN SERPL-MCNC: 11 MG/DL (ref 8–23)
CALCIUM SERPL-MCNC: 10.2 MG/DL (ref 8.7–10.5)
CHLORIDE SERPL-SCNC: 105 MMOL/L (ref 95–110)
CHOLEST SERPL-MCNC: 165 MG/DL (ref 120–199)
CHOLEST/HDLC SERPL: 2.7 {RATIO} (ref 2–5)
CO2 SERPL-SCNC: 26 MMOL/L (ref 23–29)
CREAT SERPL-MCNC: 0.6 MG/DL (ref 0.5–1.4)
DIFFERENTIAL METHOD: NORMAL
EOSINOPHIL # BLD AUTO: 0.1 K/UL (ref 0–0.5)
EOSINOPHIL NFR BLD: 2.5 % (ref 0–8)
ERYTHROCYTE [DISTWIDTH] IN BLOOD BY AUTOMATED COUNT: 13.1 % (ref 11.5–14.5)
EST. GFR  (NO RACE VARIABLE): >60 ML/MIN/1.73 M^2
ESTIMATED AVG GLUCOSE: 120 MG/DL (ref 68–131)
GLUCOSE SERPL-MCNC: 94 MG/DL (ref 70–110)
HBA1C MFR BLD: 5.8 % (ref 4–5.6)
HCT VFR BLD AUTO: 38.8 % (ref 37–48.5)
HDLC SERPL-MCNC: 61 MG/DL (ref 40–75)
HDLC SERPL: 37 % (ref 20–50)
HGB BLD-MCNC: 12.5 G/DL (ref 12–16)
IMM GRANULOCYTES # BLD AUTO: 0.01 K/UL (ref 0–0.04)
IMM GRANULOCYTES NFR BLD AUTO: 0.2 % (ref 0–0.5)
LDLC SERPL CALC-MCNC: 81.4 MG/DL (ref 63–159)
LYMPHOCYTES # BLD AUTO: 2.1 K/UL (ref 1–4.8)
LYMPHOCYTES NFR BLD: 36.7 % (ref 18–48)
MCH RBC QN AUTO: 30.1 PG (ref 27–31)
MCHC RBC AUTO-ENTMCNC: 32.2 G/DL (ref 32–36)
MCV RBC AUTO: 94 FL (ref 82–98)
MONOCYTES # BLD AUTO: 0.4 K/UL (ref 0.3–1)
MONOCYTES NFR BLD: 7.2 % (ref 4–15)
NEUTROPHILS # BLD AUTO: 3 K/UL (ref 1.8–7.7)
NEUTROPHILS NFR BLD: 52.7 % (ref 38–73)
NONHDLC SERPL-MCNC: 104 MG/DL
NRBC BLD-RTO: 0 /100 WBC
PLATELET # BLD AUTO: 290 K/UL (ref 150–450)
PMV BLD AUTO: 10.2 FL (ref 9.2–12.9)
POTASSIUM SERPL-SCNC: 4.5 MMOL/L (ref 3.5–5.1)
PROT SERPL-MCNC: 7.2 G/DL (ref 6–8.4)
RBC # BLD AUTO: 4.15 M/UL (ref 4–5.4)
SODIUM SERPL-SCNC: 142 MMOL/L (ref 136–145)
TRIGL SERPL-MCNC: 113 MG/DL (ref 30–150)
TSH SERPL DL<=0.005 MIU/L-ACNC: 1.66 UIU/ML (ref 0.4–4)
WBC # BLD AUTO: 5.67 K/UL (ref 3.9–12.7)

## 2023-02-10 PROCEDURE — 85025 COMPLETE CBC W/AUTO DIFF WBC: CPT | Performed by: FAMILY MEDICINE

## 2023-02-10 PROCEDURE — 84443 ASSAY THYROID STIM HORMONE: CPT | Performed by: FAMILY MEDICINE

## 2023-02-10 PROCEDURE — 83036 HEMOGLOBIN GLYCOSYLATED A1C: CPT | Performed by: FAMILY MEDICINE

## 2023-02-10 PROCEDURE — 80053 COMPREHEN METABOLIC PANEL: CPT | Performed by: FAMILY MEDICINE

## 2023-02-10 PROCEDURE — 36415 COLL VENOUS BLD VENIPUNCTURE: CPT | Mod: PO | Performed by: FAMILY MEDICINE

## 2023-02-10 PROCEDURE — 80061 LIPID PANEL: CPT | Performed by: FAMILY MEDICINE

## 2023-02-17 ENCOUNTER — OFFICE VISIT (OUTPATIENT)
Dept: INTERNAL MEDICINE | Facility: CLINIC | Age: 63
End: 2023-02-17
Payer: COMMERCIAL

## 2023-02-17 VITALS
BODY MASS INDEX: 29.59 KG/M2 | TEMPERATURE: 99 F | HEIGHT: 64 IN | SYSTOLIC BLOOD PRESSURE: 100 MMHG | WEIGHT: 173.31 LBS | HEART RATE: 83 BPM | DIASTOLIC BLOOD PRESSURE: 60 MMHG

## 2023-02-17 DIAGNOSIS — Z00.00 ANNUAL PHYSICAL EXAM: Primary | ICD-10-CM

## 2023-02-17 PROCEDURE — 99999 PR PBB SHADOW E&M-EST. PATIENT-LVL IV: ICD-10-PCS | Mod: PBBFAC,,, | Performed by: FAMILY MEDICINE

## 2023-02-17 PROCEDURE — 3044F HG A1C LEVEL LT 7.0%: CPT | Mod: CPTII,S$GLB,, | Performed by: FAMILY MEDICINE

## 2023-02-17 PROCEDURE — 3074F SYST BP LT 130 MM HG: CPT | Mod: CPTII,S$GLB,, | Performed by: FAMILY MEDICINE

## 2023-02-17 PROCEDURE — 3074F PR MOST RECENT SYSTOLIC BLOOD PRESSURE < 130 MM HG: ICD-10-PCS | Mod: CPTII,S$GLB,, | Performed by: FAMILY MEDICINE

## 2023-02-17 PROCEDURE — 99999 PR PBB SHADOW E&M-EST. PATIENT-LVL IV: CPT | Mod: PBBFAC,,, | Performed by: FAMILY MEDICINE

## 2023-02-17 PROCEDURE — 1159F MED LIST DOCD IN RCRD: CPT | Mod: CPTII,S$GLB,, | Performed by: FAMILY MEDICINE

## 2023-02-17 PROCEDURE — 1159F PR MEDICATION LIST DOCUMENTED IN MEDICAL RECORD: ICD-10-PCS | Mod: CPTII,S$GLB,, | Performed by: FAMILY MEDICINE

## 2023-02-17 PROCEDURE — 3044F PR MOST RECENT HEMOGLOBIN A1C LEVEL <7.0%: ICD-10-PCS | Mod: CPTII,S$GLB,, | Performed by: FAMILY MEDICINE

## 2023-02-17 PROCEDURE — 3008F PR BODY MASS INDEX (BMI) DOCUMENTED: ICD-10-PCS | Mod: CPTII,S$GLB,, | Performed by: FAMILY MEDICINE

## 2023-02-17 PROCEDURE — 99396 PR PREVENTIVE VISIT,EST,40-64: ICD-10-PCS | Mod: S$GLB,,, | Performed by: FAMILY MEDICINE

## 2023-02-17 PROCEDURE — 3078F DIAST BP <80 MM HG: CPT | Mod: CPTII,S$GLB,, | Performed by: FAMILY MEDICINE

## 2023-02-17 PROCEDURE — 3008F BODY MASS INDEX DOCD: CPT | Mod: CPTII,S$GLB,, | Performed by: FAMILY MEDICINE

## 2023-02-17 PROCEDURE — 1160F RVW MEDS BY RX/DR IN RCRD: CPT | Mod: CPTII,S$GLB,, | Performed by: FAMILY MEDICINE

## 2023-02-17 PROCEDURE — 3078F PR MOST RECENT DIASTOLIC BLOOD PRESSURE < 80 MM HG: ICD-10-PCS | Mod: CPTII,S$GLB,, | Performed by: FAMILY MEDICINE

## 2023-02-17 PROCEDURE — 1160F PR REVIEW ALL MEDS BY PRESCRIBER/CLIN PHARMACIST DOCUMENTED: ICD-10-PCS | Mod: CPTII,S$GLB,, | Performed by: FAMILY MEDICINE

## 2023-02-17 PROCEDURE — 99396 PREV VISIT EST AGE 40-64: CPT | Mod: S$GLB,,, | Performed by: FAMILY MEDICINE

## 2023-02-17 RX ORDER — METFORMIN HYDROCHLORIDE 500 MG/1
500 TABLET ORAL 2 TIMES DAILY WITH MEALS
Qty: 180 TABLET | Refills: 3 | Status: SHIPPED | OUTPATIENT
Start: 2023-02-17 | End: 2024-02-19 | Stop reason: SDUPTHER

## 2023-02-17 RX ORDER — ATORVASTATIN CALCIUM 20 MG/1
20 TABLET, FILM COATED ORAL DAILY
Qty: 90 TABLET | Refills: 3 | Status: SHIPPED | OUTPATIENT
Start: 2023-02-17 | End: 2024-02-19 | Stop reason: SDUPTHER

## 2023-02-17 NOTE — PROGRESS NOTES
"Subjective:      Patient ID: Shirley Amaya is a 63 y.o. female.    Chief Complaint: Annual Exam    HPI 63 y.o.   female patient with a PMHx of abnormal glucose, HLD, OA, and YUDITH on CPAP presents to clinic for annual exam. The patient was last seen on August 15, 2022      Today she reports her meloxicam is not working anymore for her pain. Patient reports she have been taking tylenol with it. Patient otherwise without complaints. Denies SOB, chest pain, and bowel changes.        Past Medical History:   Diagnosis Date    Abnormal glucose     Hyperlipidemia     OA (osteoarthritis) of knee     YUDITH on CPAP      Family History   Problem Relation Age of Onset    Diabetes Father     Lung cancer Sister     Stomach cancer Brother     Heart disease Neg Hx     Hypertension Neg Hx     Colon cancer Neg Hx     Breast cancer Neg Hx     Ovarian cancer Neg Hx      Past Surgical History:   Procedure Laterality Date    CERVICAL CONIZATION   W/ LASER      COLONOSCOPY N/A 2016    Procedure: COLONOSCOPY;  Surgeon: Mary Victor MD;  Location: [facility] ENDO;  Service: Endoscopy;  Laterality: N/A;    COLONOSCOPY N/A 2022    Procedure: COLONOSCOPY;  Surgeon: Andrae Barger MD;  Location: [facility] ENDO;  Service: Endoscopy;  Laterality: N/A;     Social History     Tobacco Use    Smoking status: Former     Packs/day: 2.00     Years: 25.00     Pack years: 50.00     Types: Cigarettes     Quit date: 2000     Years since quittin.8    Smokeless tobacco: Never   Substance Use Topics    Alcohol use: No    Drug use: No       /60   Pulse 83   Temp 99.4 °F (37.4 °C)   Ht 5' 4" (1.626 m)   Wt 78.6 kg (173 lb 4.5 oz)   LMP  (LMP Unknown)   BMI 29.74 kg/m²     Review of Systems   Constitutional:  Negative for activity change, appetite change, chills, diaphoresis, fatigue, fever and unexpected weight change.   HENT:  Negative for congestion, ear pain, hearing loss, postnasal drip, rhinorrhea, sinus pain and " sore throat.    Eyes:  Negative for pain, discharge, itching and visual disturbance.   Respiratory:  Negative for cough, chest tightness, shortness of breath and wheezing.    Cardiovascular:  Negative for chest pain, palpitations and leg swelling.   Gastrointestinal:  Negative for abdominal pain, constipation, diarrhea, nausea and vomiting.   Endocrine: Negative for polydipsia and polyuria.   Genitourinary:  Negative for difficulty urinating, dysuria, flank pain, frequency, menstrual problem, pelvic pain and urgency.   Musculoskeletal:  Positive for arthralgias. Negative for back pain, joint swelling, myalgias and neck pain.   Skin:  Negative for color change and rash.   Neurological:  Negative for dizziness, weakness, light-headedness and headaches.   Hematological:  Negative for adenopathy.   Psychiatric/Behavioral:  Negative for confusion, decreased concentration and dysphoric mood.      Objective:     Physical Exam  Vitals and nursing note reviewed.   Constitutional:       General: She is not in acute distress.  HENT:      Right Ear: External ear normal.      Left Ear: External ear normal.      Nose: Nose normal.   Eyes:      Conjunctiva/sclera: Conjunctivae normal.      Pupils: Pupils are equal, round, and reactive to light.   Cardiovascular:      Rate and Rhythm: Normal rate and regular rhythm.      Heart sounds: Normal heart sounds.   Pulmonary:      Effort: Pulmonary effort is normal. No respiratory distress.      Breath sounds: Normal breath sounds. No wheezing or rales.   Abdominal:      General: Bowel sounds are normal. There is no distension.      Palpations: Abdomen is soft.      Tenderness: There is no abdominal tenderness. There is no guarding.   Musculoskeletal:      Cervical back: Normal range of motion and neck supple.      Right lower leg: No edema.      Left lower leg: No edema.   Skin:     General: Skin is warm and dry.      Findings: No rash.   Neurological:      Mental Status: She is alert and  oriented to person, place, and time.   Psychiatric:         Behavior: Behavior normal.         Thought Content: Thought content normal.         Judgment: Judgment normal.       Lab Results   Component Value Date    WBC 5.67 02/10/2023    HGB 12.5 02/10/2023    HCT 38.8 02/10/2023     02/10/2023    CHOL 165 02/10/2023    TRIG 113 02/10/2023    HDL 61 02/10/2023    ALT 11 02/10/2023    AST 16 02/10/2023     02/10/2023    K 4.5 02/10/2023     02/10/2023    CREATININE 0.6 02/10/2023    BUN 11 02/10/2023    CO2 26 02/10/2023    TSH 1.663 02/10/2023    HGBA1C 5.8 (H) 02/10/2023       Assessment:     1. Annual physical exam         Plan:     Annual physical exam    Other orders  -     metFORMIN (GLUCOPHAGE) 500 MG tablet; Take 1 tablet (500 mg total) by mouth 2 (two) times daily with meals.  Dispense: 180 tablet; Refill: 3  -     atorvastatin (LIPITOR) 20 MG tablet; Take 1 tablet (20 mg total) by mouth once daily.  Dispense: 90 tablet; Refill: 3        Vitals reviewed. BP within normal range at 100/60.    Labs reviewed and discussed. A1C at 5.8. Cholesterol panel have improved. Thyroid levels are normal.  Metformin refilled.  Patient overall doing well.  Questions and concerns addressed.  Follow-up annually or PRN        Documentation entered by Kassi Farris, acting as scribe for Dr. Milton Disla. 02/17/2023 7:27 AM.

## 2023-03-17 ENCOUNTER — PATIENT MESSAGE (OUTPATIENT)
Dept: RESEARCH | Facility: HOSPITAL | Age: 63
End: 2023-03-17
Payer: COMMERCIAL

## 2023-04-28 ENCOUNTER — OFFICE VISIT (OUTPATIENT)
Dept: CARDIOLOGY | Facility: CLINIC | Age: 63
End: 2023-04-28
Payer: COMMERCIAL

## 2023-04-28 VITALS
SYSTOLIC BLOOD PRESSURE: 128 MMHG | HEART RATE: 91 BPM | BODY MASS INDEX: 29.7 KG/M2 | OXYGEN SATURATION: 95 % | HEIGHT: 64 IN | DIASTOLIC BLOOD PRESSURE: 76 MMHG | WEIGHT: 173.94 LBS

## 2023-04-28 DIAGNOSIS — Z00.00 ROUTINE HEALTH MAINTENANCE: ICD-10-CM

## 2023-04-28 DIAGNOSIS — E78.5 HYPERLIPIDEMIA, UNSPECIFIED HYPERLIPIDEMIA TYPE: ICD-10-CM

## 2023-04-28 DIAGNOSIS — G47.33 OSA ON CPAP: ICD-10-CM

## 2023-04-28 DIAGNOSIS — R07.89 CHEST PRESSURE: Primary | ICD-10-CM

## 2023-04-28 DIAGNOSIS — M85.80 OSTEOPENIA, UNSPECIFIED LOCATION: ICD-10-CM

## 2023-04-28 DIAGNOSIS — Z76.89 ESTABLISHING CARE WITH NEW DOCTOR, ENCOUNTER FOR: ICD-10-CM

## 2023-04-28 DIAGNOSIS — Z82.49 FAMILY HISTORY OF HEART DISEASE: ICD-10-CM

## 2023-04-28 DIAGNOSIS — R73.03 PREDIABETES: ICD-10-CM

## 2023-04-28 DIAGNOSIS — Z76.89 ESTABLISHING CARE WITH NEW DOCTOR, ENCOUNTER FOR: Primary | ICD-10-CM

## 2023-04-28 PROCEDURE — 3008F PR BODY MASS INDEX (BMI) DOCUMENTED: ICD-10-PCS | Mod: CPTII,S$GLB,, | Performed by: STUDENT IN AN ORGANIZED HEALTH CARE EDUCATION/TRAINING PROGRAM

## 2023-04-28 PROCEDURE — 93010 EKG 12-LEAD: ICD-10-PCS | Mod: S$GLB,,, | Performed by: INTERNAL MEDICINE

## 2023-04-28 PROCEDURE — 1159F PR MEDICATION LIST DOCUMENTED IN MEDICAL RECORD: ICD-10-PCS | Mod: CPTII,S$GLB,, | Performed by: STUDENT IN AN ORGANIZED HEALTH CARE EDUCATION/TRAINING PROGRAM

## 2023-04-28 PROCEDURE — 93005 ELECTROCARDIOGRAM TRACING: CPT | Mod: PO

## 2023-04-28 PROCEDURE — 93010 ELECTROCARDIOGRAM REPORT: CPT | Mod: S$GLB,,, | Performed by: INTERNAL MEDICINE

## 2023-04-28 PROCEDURE — 1159F MED LIST DOCD IN RCRD: CPT | Mod: CPTII,S$GLB,, | Performed by: STUDENT IN AN ORGANIZED HEALTH CARE EDUCATION/TRAINING PROGRAM

## 2023-04-28 PROCEDURE — 3044F PR MOST RECENT HEMOGLOBIN A1C LEVEL <7.0%: ICD-10-PCS | Mod: CPTII,S$GLB,, | Performed by: STUDENT IN AN ORGANIZED HEALTH CARE EDUCATION/TRAINING PROGRAM

## 2023-04-28 PROCEDURE — 3044F HG A1C LEVEL LT 7.0%: CPT | Mod: CPTII,S$GLB,, | Performed by: STUDENT IN AN ORGANIZED HEALTH CARE EDUCATION/TRAINING PROGRAM

## 2023-04-28 PROCEDURE — 99999 PR PBB SHADOW E&M-EST. PATIENT-LVL III: ICD-10-PCS | Mod: PBBFAC,,, | Performed by: STUDENT IN AN ORGANIZED HEALTH CARE EDUCATION/TRAINING PROGRAM

## 2023-04-28 PROCEDURE — 99999 PR PBB SHADOW E&M-EST. PATIENT-LVL III: CPT | Mod: PBBFAC,,, | Performed by: STUDENT IN AN ORGANIZED HEALTH CARE EDUCATION/TRAINING PROGRAM

## 2023-04-28 PROCEDURE — 3074F PR MOST RECENT SYSTOLIC BLOOD PRESSURE < 130 MM HG: ICD-10-PCS | Mod: CPTII,S$GLB,, | Performed by: STUDENT IN AN ORGANIZED HEALTH CARE EDUCATION/TRAINING PROGRAM

## 2023-04-28 PROCEDURE — 3008F BODY MASS INDEX DOCD: CPT | Mod: CPTII,S$GLB,, | Performed by: STUDENT IN AN ORGANIZED HEALTH CARE EDUCATION/TRAINING PROGRAM

## 2023-04-28 PROCEDURE — 99204 OFFICE O/P NEW MOD 45 MIN: CPT | Mod: S$GLB,,, | Performed by: STUDENT IN AN ORGANIZED HEALTH CARE EDUCATION/TRAINING PROGRAM

## 2023-04-28 PROCEDURE — 3078F PR MOST RECENT DIASTOLIC BLOOD PRESSURE < 80 MM HG: ICD-10-PCS | Mod: CPTII,S$GLB,, | Performed by: STUDENT IN AN ORGANIZED HEALTH CARE EDUCATION/TRAINING PROGRAM

## 2023-04-28 PROCEDURE — 3078F DIAST BP <80 MM HG: CPT | Mod: CPTII,S$GLB,, | Performed by: STUDENT IN AN ORGANIZED HEALTH CARE EDUCATION/TRAINING PROGRAM

## 2023-04-28 PROCEDURE — 99204 PR OFFICE/OUTPT VISIT, NEW, LEVL IV, 45-59 MIN: ICD-10-PCS | Mod: S$GLB,,, | Performed by: STUDENT IN AN ORGANIZED HEALTH CARE EDUCATION/TRAINING PROGRAM

## 2023-04-28 PROCEDURE — 3074F SYST BP LT 130 MM HG: CPT | Mod: CPTII,S$GLB,, | Performed by: STUDENT IN AN ORGANIZED HEALTH CARE EDUCATION/TRAINING PROGRAM

## 2023-04-28 NOTE — PROGRESS NOTES
Section of Cardiology                  Cardiac Clinic Note    Chief Complaint/Reason for consultation:       HPI:   Shirley Amaya is a 63 y.o. female with h/o YUDITH, HLD, diabetes, osteopenia who comes into Cardiology Clinic for evaluation.      4/28/23  Comes in with   Couple months ago, had chest pain, left sided, no radiation   Felt like a pulled muscle  Now feeling like a heaviness  Not worse with moving around, constant pain when does come on   Active at home   Does not exercise regularly    Stopped smoking >20 years  Denies ETOH abuse     Denies SOB, nausea, PND, orthopnea, syncope, dizziness    Family history: dad- CABG, in 60s yo     Denies CVA, HTN         EKG 4/28/23 NSR, no acute ST - T wave changes    ECHO  No results found for this or any previous visit.       STRESS TEST No results found for this or any previous visit.       [facility] No results found for this or any previous visit.            ROS: All 10 systems reviewed. Please refer to the HPI for pertinent positives. All other systems negative.     Past Medical History  Past Medical History:   Diagnosis Date    Abnormal glucose     Hyperlipidemia     OA (osteoarthritis) of knee     YUDITH on CPAP        Surgical History  Past Surgical History:   Procedure Laterality Date    CERVICAL CONIZATION   W/ LASER      COLONOSCOPY N/A 11/30/2016    Procedure: COLONOSCOPY;  Surgeon: Mary Victor MD;  Location: [facility] ENDO;  Service: Endoscopy;  Laterality: N/A;    COLONOSCOPY N/A 02/28/2022    Procedure: COLONOSCOPY;  Surgeon: Andrae Barger MD;  Location: [facility] ENDO;  Service: Endoscopy;  Laterality: N/A;          Allergies:   Review of patient's allergies indicates:  No Known Allergies    Social History:  Social History     Socioeconomic History    Marital status:     Number of children: 2   Occupational History    Occupation: [employer]   Tobacco Use    Smoking status: Former     Packs/day: 2.00     Years: 25.00     Pack  years: 50.00     Types: Cigarettes     Quit date: 2000     Years since quittin.0    Smokeless tobacco: Never   Substance and Sexual Activity    Alcohol use: No    Drug use: No    Sexual activity: Yes     Partners: Male     Birth control/protection: Post-menopausal     Social Determinants of Health     Financial Resource Strain: Unknown    Difficulty of Paying Living Expenses: Patient refused   Food Insecurity: Unknown    Worried About Running Out of Food in the Last Year: Patient refused    Ran Out of Food in the Last Year: Patient refused   Transportation Needs: Unknown    Lack of Transportation (Medical): Patient refused    Lack of Transportation (Non-Medical): Patient refused   Physical Activity: Insufficiently Active    Days of Exercise per Week: 3 days    Minutes of Exercise per Session: 30 min   Stress: No Stress Concern Present    Feeling of Stress : Only a little   Social Connections: Unknown    Frequency of Communication with Friends and Family: Patient refused    Frequency of Social Gatherings with Friends and Family: Patient refused    Active Member of Clubs or Organizations: Patient refused    Attends Club or Organization Meetings: Patient refused    Marital Status: Patient refused   Housing Stability: Unknown    Unable to Pay for Housing in the Last Year: Patient refused    Unstable Housing in the Last Year: Patient refused       Family History:  family history includes Diabetes in her father; Lung cancer in her sister; Stomach cancer in her brother.    Home Medications:  Current Outpatient Medications on File Prior to Visit   Medication Sig Dispense Refill    atorvastatin (LIPITOR) 20 MG tablet Take 1 tablet (20 mg total) by mouth once daily. 90 tablet 3    clobetasol 0.05% (TEMOVATE) 0.05 % Oint Apply topically nightly as needed (itching). Do not use for more than 2 weeks continously 30 g 3    meloxicam (MOBIC) 15 MG tablet Take 1 tablet (15 mg total) by mouth daily as needed for Pain. 30  "tablet 3    metFORMIN (GLUCOPHAGE) 500 MG tablet Take 1 tablet (500 mg total) by mouth 2 (two) times daily with meals. 180 tablet 3    fluticasone propionate (FLONASE) 50 mcg/actuation nasal spray 1 spray (50 mcg total) by Each Nostril route once daily. (Patient not taking: Reported on 4/28/2023) 15.8 mL 0     No current facility-administered medications on file prior to visit.       Physical exam:  /76 (BP Location: Right arm, Patient Position: Sitting, BP Method: Large (Manual))   Pulse 91   Ht 5' 4" (1.626 m)   Wt 78.9 kg (173 lb 15.1 oz)   LMP  (LMP Unknown)   SpO2 95%   BMI 29.86 kg/m²         General: Pt is a 63 y.o. year old female who is AAOx3, in NAD, is pleasant, well nourished, looks stated age  HEENT: PERRL, EOMI, Oral mucosa pink & moist  CVS  No abnormal cardiac pulsations noted on inspection. JVP not raised. The apical impulse is normal on palpation, and is located in the left 5th intercostal space in the mid - clavicular line. No palpable thrills or abnormal pulsations noted. RR, S1 - S2 heard, no murmurs, rubs or gallops appreciated.   PUL : CTA B/L. No wheezes/crackles heard   ABD : BS +, soft. No tenderness elicited   LE : No C/C/E. Distal Pulses palpable B/L         LABS:    Chemistry:   Lab Results   Component Value Date     02/10/2023    K 4.5 02/10/2023     02/10/2023    CO2 26 02/10/2023    BUN 11 02/10/2023    CREATININE 0.6 02/10/2023    CALCIUM 10.2 02/10/2023     Cardiac Markers: No results found for: CKTOTAL, CKMB, CKMBINDEX, TROPONINI  Cardiac Markers (Last 3): No results found for: CKTOTAL, CKMB, CKMBINDEX, TROPONINI  CBC:   Lab Results   Component Value Date    WBC 5.67 02/10/2023    HGB 12.5 02/10/2023    HCT 38.8 02/10/2023    MCV 94 02/10/2023     02/10/2023     Lipids:   Lab Results   Component Value Date    CHOL 165 02/10/2023    TRIG 113 02/10/2023    HDL 61 02/10/2023     Coagulation: No results found for: PT, INR, APTT        Assessment    1. " Chest pressure    2. YUDITH on CPAP    3. Osteopenia, unspecified location    4. Hyperlipidemia, unspecified hyperlipidemia type    5. Prediabetes    6. Family history of heart disease         Plan:    Chest pressure   Family history of CAD   Obtain ETT     Osteopenia  Stable   P.r.n. meloxicam for knee pain     HLD   LDL 81 as of 2/23  Continue statin    Pre diabetes  A1c 5.8  Continue metformin      Low salt, low fat diet  Exercise as tolerated, at least 30 min daily         This note was prepared using voice recognition system and is likely to have sound alike errors that may have been overlooked even after proofreading.     I have reviewed all pertinent chart information.  Plans and recommendations have been formulated under my direct supervision. All questions answered and patient voiced understanding.   If symptoms persist go to the ED.    RTC in 1 month         Compa Massey MD  Cardiology

## 2023-05-01 ENCOUNTER — TELEPHONE (OUTPATIENT)
Dept: CARDIOLOGY | Facility: HOSPITAL | Age: 63
End: 2023-05-01
Payer: COMMERCIAL

## 2023-07-20 ENCOUNTER — PATIENT MESSAGE (OUTPATIENT)
Dept: INTERNAL MEDICINE | Facility: CLINIC | Age: 63
End: 2023-07-20
Payer: COMMERCIAL

## 2023-09-07 ENCOUNTER — PATIENT MESSAGE (OUTPATIENT)
Dept: INTERNAL MEDICINE | Facility: CLINIC | Age: 63
End: 2023-09-07
Payer: COMMERCIAL

## 2023-09-08 ENCOUNTER — PATIENT MESSAGE (OUTPATIENT)
Dept: INTERNAL MEDICINE | Facility: CLINIC | Age: 63
End: 2023-09-08
Payer: COMMERCIAL

## 2023-09-08 DIAGNOSIS — Z12.31 SCREENING MAMMOGRAM FOR BREAST CANCER: Primary | ICD-10-CM

## 2023-09-20 ENCOUNTER — HOSPITAL ENCOUNTER (OUTPATIENT)
Dept: RADIOLOGY | Facility: HOSPITAL | Age: 63
Discharge: HOME | End: 2023-09-20
Attending: FAMILY MEDICINE
Payer: COMMERCIAL

## 2023-09-20 DIAGNOSIS — Z12.31 SCREENING MAMMOGRAM FOR BREAST CANCER: ICD-10-CM

## 2023-09-20 PROCEDURE — 77063 BREAST TOMOSYNTHESIS BI: CPT | Mod: 26,,, | Performed by: RADIOLOGY

## 2023-09-20 PROCEDURE — 77063 MAMMO DIGITAL SCREENING BILAT WITH TOMO: ICD-10-PCS | Mod: 26,,, | Performed by: RADIOLOGY

## 2023-09-20 PROCEDURE — 77067 SCR MAMMO BI INCL CAD: CPT | Mod: TC,PN

## 2023-09-20 PROCEDURE — 77067 SCR MAMMO BI INCL CAD: CPT | Mod: 26,,, | Performed by: RADIOLOGY

## 2023-09-20 PROCEDURE — 77067 MAMMO DIGITAL SCREENING BILAT WITH TOMO: ICD-10-PCS | Mod: 26,,, | Performed by: RADIOLOGY

## 2024-02-19 ENCOUNTER — OFFICE VISIT (OUTPATIENT)
Dept: INTERNAL MEDICINE | Facility: CLINIC | Age: 64
End: 2024-02-19
Payer: COMMERCIAL

## 2024-02-19 VITALS
HEIGHT: 64 IN | WEIGHT: 168.44 LBS | OXYGEN SATURATION: 98 % | BODY MASS INDEX: 28.76 KG/M2 | DIASTOLIC BLOOD PRESSURE: 72 MMHG | TEMPERATURE: 97 F | HEART RATE: 84 BPM | SYSTOLIC BLOOD PRESSURE: 126 MMHG

## 2024-02-19 DIAGNOSIS — Z00.00 ROUTINE GENERAL MEDICAL EXAMINATION AT A HEALTH CARE FACILITY: Primary | ICD-10-CM

## 2024-02-19 DIAGNOSIS — R73.03 PREDIABETES: ICD-10-CM

## 2024-02-19 DIAGNOSIS — M25.562 CHRONIC PAIN OF BOTH KNEES: ICD-10-CM

## 2024-02-19 DIAGNOSIS — E78.2 MIXED HYPERLIPIDEMIA: ICD-10-CM

## 2024-02-19 DIAGNOSIS — N90.4 LICHEN SCLEROSUS OF VULVA: ICD-10-CM

## 2024-02-19 DIAGNOSIS — E55.9 VITAMIN D DEFICIENCY: ICD-10-CM

## 2024-02-19 DIAGNOSIS — M25.561 CHRONIC PAIN OF BOTH KNEES: ICD-10-CM

## 2024-02-19 DIAGNOSIS — G89.29 CHRONIC PAIN OF BOTH KNEES: ICD-10-CM

## 2024-02-19 PROCEDURE — 1159F MED LIST DOCD IN RCRD: CPT | Mod: CPTII,S$GLB,, | Performed by: FAMILY MEDICINE

## 2024-02-19 PROCEDURE — 3078F DIAST BP <80 MM HG: CPT | Mod: CPTII,S$GLB,, | Performed by: FAMILY MEDICINE

## 2024-02-19 PROCEDURE — 99396 PREV VISIT EST AGE 40-64: CPT | Mod: S$GLB,,, | Performed by: FAMILY MEDICINE

## 2024-02-19 PROCEDURE — 3008F BODY MASS INDEX DOCD: CPT | Mod: CPTII,S$GLB,, | Performed by: FAMILY MEDICINE

## 2024-02-19 PROCEDURE — 3074F SYST BP LT 130 MM HG: CPT | Mod: CPTII,S$GLB,, | Performed by: FAMILY MEDICINE

## 2024-02-19 PROCEDURE — 1160F RVW MEDS BY RX/DR IN RCRD: CPT | Mod: CPTII,S$GLB,, | Performed by: FAMILY MEDICINE

## 2024-02-19 PROCEDURE — 99999 PR PBB SHADOW E&M-EST. PATIENT-LVL IV: CPT | Mod: PBBFAC,,, | Performed by: FAMILY MEDICINE

## 2024-02-19 RX ORDER — ATORVASTATIN CALCIUM 20 MG/1
20 TABLET, FILM COATED ORAL DAILY
Qty: 90 TABLET | Refills: 3 | Status: SHIPPED | OUTPATIENT
Start: 2024-02-19

## 2024-02-19 RX ORDER — CLOBETASOL PROPIONATE 0.5 MG/G
OINTMENT TOPICAL NIGHTLY PRN
Qty: 30 G | Refills: 3 | Status: SHIPPED | OUTPATIENT
Start: 2024-02-19 | End: 2025-02-18

## 2024-02-19 RX ORDER — MELOXICAM 15 MG/1
15 TABLET ORAL DAILY PRN
Qty: 30 TABLET | Refills: 3 | Status: SHIPPED | OUTPATIENT
Start: 2024-02-19 | End: 2024-06-17

## 2024-02-19 RX ORDER — METFORMIN HYDROCHLORIDE 500 MG/1
500 TABLET ORAL 2 TIMES DAILY WITH MEALS
Qty: 180 TABLET | Refills: 3 | Status: SHIPPED | OUTPATIENT
Start: 2024-02-19

## 2024-02-19 NOTE — PROGRESS NOTES
Subjective     Patient ID: Shirley Amaya is a 64 y.o. female.    Chief Complaint: Establish Care    Patient presents today to establish care and for annual. Patient states her left shoulder is bothering her from dumb bell workout and cleaning out the pool and would like a refill on meloxicam. Otherwise patient is doing well without complaints.      Review of Systems   Constitutional: Negative.    HENT: Negative.     Eyes:  Negative for discharge and redness.   Respiratory: Negative.     Cardiovascular: Negative.  Negative for chest pain, palpitations and leg swelling.   Gastrointestinal: Negative.  Negative for constipation and diarrhea.   Musculoskeletal:  Positive for arthralgias. Negative for gait problem.   Neurological: Negative.  Negative for coordination difficulties.   Psychiatric/Behavioral: Negative.            Objective     Physical Exam  Vitals and nursing note reviewed.   Constitutional:       Appearance: Normal appearance. She is normal weight.   HENT:      Head: Normocephalic and atraumatic.      Right Ear: Tympanic membrane, ear canal and external ear normal.      Left Ear: Tympanic membrane, ear canal and external ear normal.      Nose: Nose normal.      Mouth/Throat:      Mouth: Mucous membranes are moist.      Pharynx: Oropharynx is clear.   Eyes:      Extraocular Movements: Extraocular movements intact.      Conjunctiva/sclera: Conjunctivae normal.      Pupils: Pupils are equal, round, and reactive to light.   Cardiovascular:      Rate and Rhythm: Normal rate and regular rhythm.      Pulses: Normal pulses.      Heart sounds: Normal heart sounds.   Pulmonary:      Effort: Pulmonary effort is normal.      Breath sounds: Normal breath sounds.   Abdominal:      General: Abdomen is flat. Bowel sounds are normal.      Palpations: Abdomen is soft.   Musculoskeletal:         General: Normal range of motion.      Cervical back: Normal range of motion and neck supple.      Right lower leg: No  edema.      Left lower leg: No edema.   Lymphadenopathy:      Cervical: No cervical adenopathy.   Skin:     General: Skin is warm and dry.   Neurological:      General: No focal deficit present.      Mental Status: She is alert and oriented to person, place, and time.   Psychiatric:         Mood and Affect: Mood normal.         Behavior: Behavior normal.            Assessment and Plan     1. Routine general medical examination at a health care facility  Comments:  reviewed age appropriate screenings and immunizations  Orders:  -     T4, Free; Future; Expected date: 03/04/2024  -     T3, Free; Future; Expected date: 03/04/2024  -     TSH; Future; Expected date: 03/04/2024  -     Comprehensive Metabolic Panel; Future; Expected date: 03/04/2024  -     CBC Auto Differential; Future; Expected date: 03/04/2024    2. Chronic pain of both knees  -     meloxicam (MOBIC) 15 MG tablet; Take 1 tablet (15 mg total) by mouth daily as needed for Pain.  Dispense: 30 tablet; Refill: 3    3. Lichen sclerosus of vulva  Overview:  Dx with biopsy 2019    Orders:  -     clobetasol 0.05% (TEMOVATE) 0.05 % Oint; Apply topically nightly as needed (itching). Do not use for more than 2 weeks continously  Dispense: 30 g; Refill: 3    4. Vitamin D deficiency  -     Calcitriol; Future; Expected date: 03/04/2024    5. Prediabetes  -     metFORMIN (GLUCOPHAGE) 500 MG tablet; Take 1 tablet (500 mg total) by mouth 2 (two) times daily with meals.  Dispense: 180 tablet; Refill: 3  -     Hemoglobin A1C; Future; Expected date: 03/04/2024    6. Mixed hyperlipidemia  -     atorvastatin (LIPITOR) 20 MG tablet; Take 1 tablet (20 mg total) by mouth once daily.  Dispense: 90 tablet; Refill: 3  -     Lipid Panel; Future; Expected date: 03/04/2024  -     Comprehensive Metabolic Panel; Future; Expected date: 03/04/2024  -     CBC Auto Differential; Future; Expected date: 03/04/2024                 Follow up in about 1 year (around 2/19/2025).

## 2024-02-26 ENCOUNTER — LAB VISIT (OUTPATIENT)
Dept: LAB | Facility: HOSPITAL | Age: 64
End: 2024-02-26
Attending: FAMILY MEDICINE
Payer: COMMERCIAL

## 2024-02-26 DIAGNOSIS — E78.2 MIXED HYPERLIPIDEMIA: ICD-10-CM

## 2024-02-26 DIAGNOSIS — E55.9 VITAMIN D DEFICIENCY: ICD-10-CM

## 2024-02-26 DIAGNOSIS — Z00.00 ROUTINE GENERAL MEDICAL EXAMINATION AT A HEALTH CARE FACILITY: ICD-10-CM

## 2024-02-26 DIAGNOSIS — R73.03 PREDIABETES: ICD-10-CM

## 2024-02-26 LAB
ALBUMIN SERPL BCP-MCNC: 4 G/DL (ref 3.5–5.2)
ALP SERPL-CCNC: 53 U/L (ref 55–135)
ALT SERPL W/O P-5'-P-CCNC: 12 U/L (ref 10–44)
ANION GAP SERPL CALC-SCNC: 7 MMOL/L (ref 8–16)
AST SERPL-CCNC: 18 U/L (ref 10–40)
BASOPHILS # BLD AUTO: 0.03 K/UL (ref 0–0.2)
BASOPHILS NFR BLD: 0.6 % (ref 0–1.9)
BILIRUB SERPL-MCNC: 0.8 MG/DL (ref 0.1–1)
BUN SERPL-MCNC: 20 MG/DL (ref 8–23)
CALCIUM SERPL-MCNC: 10 MG/DL (ref 8.7–10.5)
CHLORIDE SERPL-SCNC: 106 MMOL/L (ref 95–110)
CHOLEST SERPL-MCNC: 187 MG/DL (ref 120–199)
CHOLEST/HDLC SERPL: 2.8 {RATIO} (ref 2–5)
CO2 SERPL-SCNC: 31 MMOL/L (ref 23–29)
CREAT SERPL-MCNC: 0.6 MG/DL (ref 0.5–1.4)
DIFFERENTIAL METHOD BLD: ABNORMAL
EOSINOPHIL # BLD AUTO: 0.1 K/UL (ref 0–0.5)
EOSINOPHIL NFR BLD: 2.9 % (ref 0–8)
ERYTHROCYTE [DISTWIDTH] IN BLOOD BY AUTOMATED COUNT: 13.5 % (ref 11.5–14.5)
EST. GFR  (NO RACE VARIABLE): >60 ML/MIN/1.73 M^2
ESTIMATED AVG GLUCOSE: 123 MG/DL (ref 68–131)
GLUCOSE SERPL-MCNC: 98 MG/DL (ref 70–110)
HBA1C MFR BLD: 5.9 % (ref 4–5.6)
HCT VFR BLD AUTO: 36.1 % (ref 37–48.5)
HDLC SERPL-MCNC: 66 MG/DL (ref 40–75)
HDLC SERPL: 35.3 % (ref 20–50)
HGB BLD-MCNC: 11.9 G/DL (ref 12–16)
IMM GRANULOCYTES # BLD AUTO: 0.02 K/UL (ref 0–0.04)
IMM GRANULOCYTES NFR BLD AUTO: 0.4 % (ref 0–0.5)
LDLC SERPL CALC-MCNC: 99.2 MG/DL (ref 63–159)
LYMPHOCYTES # BLD AUTO: 1.9 K/UL (ref 1–4.8)
LYMPHOCYTES NFR BLD: 39.6 % (ref 18–48)
MCH RBC QN AUTO: 31.2 PG (ref 27–31)
MCHC RBC AUTO-ENTMCNC: 33 G/DL (ref 32–36)
MCV RBC AUTO: 95 FL (ref 82–98)
MONOCYTES # BLD AUTO: 0.3 K/UL (ref 0.3–1)
MONOCYTES NFR BLD: 6.5 % (ref 4–15)
NEUTROPHILS # BLD AUTO: 2.5 K/UL (ref 1.8–7.7)
NEUTROPHILS NFR BLD: 50 % (ref 38–73)
NONHDLC SERPL-MCNC: 121 MG/DL
NRBC BLD-RTO: 0 /100 WBC
PLATELET # BLD AUTO: 278 K/UL (ref 150–450)
PMV BLD AUTO: 10.6 FL (ref 9.2–12.9)
POTASSIUM SERPL-SCNC: 4.3 MMOL/L (ref 3.5–5.1)
PROT SERPL-MCNC: 6.9 G/DL (ref 6–8.4)
RBC # BLD AUTO: 3.82 M/UL (ref 4–5.4)
SODIUM SERPL-SCNC: 144 MMOL/L (ref 136–145)
T3FREE SERPL-MCNC: 2.8 PG/ML (ref 2.3–4.2)
T4 FREE SERPL-MCNC: 0.73 NG/DL (ref 0.71–1.51)
TRIGL SERPL-MCNC: 109 MG/DL (ref 30–150)
TSH SERPL DL<=0.005 MIU/L-ACNC: 1.45 UIU/ML (ref 0.4–4)
WBC # BLD AUTO: 4.9 K/UL (ref 3.9–12.7)

## 2024-02-26 PROCEDURE — 83036 HEMOGLOBIN GLYCOSYLATED A1C: CPT | Performed by: FAMILY MEDICINE

## 2024-02-26 PROCEDURE — 84439 ASSAY OF FREE THYROXINE: CPT | Performed by: FAMILY MEDICINE

## 2024-02-26 PROCEDURE — 80053 COMPREHEN METABOLIC PANEL: CPT | Performed by: FAMILY MEDICINE

## 2024-02-26 PROCEDURE — 36415 COLL VENOUS BLD VENIPUNCTURE: CPT | Mod: PN | Performed by: FAMILY MEDICINE

## 2024-02-26 PROCEDURE — 84481 FREE ASSAY (FT-3): CPT | Performed by: FAMILY MEDICINE

## 2024-02-26 PROCEDURE — 85025 COMPLETE CBC W/AUTO DIFF WBC: CPT | Performed by: FAMILY MEDICINE

## 2024-02-26 PROCEDURE — 80061 LIPID PANEL: CPT | Performed by: FAMILY MEDICINE

## 2024-02-26 PROCEDURE — 82652 VIT D 1 25-DIHYDROXY: CPT | Performed by: FAMILY MEDICINE

## 2024-02-26 PROCEDURE — 84443 ASSAY THYROID STIM HORMONE: CPT | Performed by: FAMILY MEDICINE

## 2024-02-29 LAB — 1,25(OH)2D3 SERPL-MCNC: 41 PG/ML (ref 20–79)

## 2024-04-04 ENCOUNTER — OFFICE VISIT (OUTPATIENT)
Dept: URGENT CARE | Facility: CLINIC | Age: 64
End: 2024-04-04
Payer: COMMERCIAL

## 2024-04-04 VITALS
HEART RATE: 87 BPM | BODY MASS INDEX: 28.85 KG/M2 | HEIGHT: 64 IN | TEMPERATURE: 98 F | RESPIRATION RATE: 16 BRPM | WEIGHT: 169 LBS | DIASTOLIC BLOOD PRESSURE: 68 MMHG | OXYGEN SATURATION: 97 % | SYSTOLIC BLOOD PRESSURE: 138 MMHG

## 2024-04-04 DIAGNOSIS — R09.81 COUGH WITH CONGESTION OF PARANASAL SINUS: ICD-10-CM

## 2024-04-04 DIAGNOSIS — R05.8 COUGH WITH CONGESTION OF PARANASAL SINUS: ICD-10-CM

## 2024-04-04 DIAGNOSIS — J01.40 ACUTE NON-RECURRENT PANSINUSITIS: Primary | ICD-10-CM

## 2024-04-04 DIAGNOSIS — R09.81 SINUS CONGESTION: ICD-10-CM

## 2024-04-04 DIAGNOSIS — R06.7 SNEEZING: ICD-10-CM

## 2024-04-04 LAB
CTP QC/QA: YES
SARS-COV-2 AG RESP QL IA.RAPID: NEGATIVE

## 2024-04-04 PROCEDURE — 87811 SARS-COV-2 COVID19 W/OPTIC: CPT | Mod: QW,S$GLB,, | Performed by: PHYSICIAN ASSISTANT

## 2024-04-04 PROCEDURE — 99214 OFFICE O/P EST MOD 30 MIN: CPT | Mod: S$GLB,,, | Performed by: PHYSICIAN ASSISTANT

## 2024-04-04 NOTE — PROGRESS NOTES
"Subjective:      Patient ID: Shirley Amaya is a 64 y.o. female.    Vitals:  height is 5' 4" (1.626 m) and weight is 76.7 kg (169 lb). Her oral temperature is 98.3 °F (36.8 °C). Her blood pressure is 138/68 and her pulse is 87. Her respiration is 16 and oxygen saturation is 97%.     Chief Complaint: Sinus Problem    Patient presents with sinus congestion, sneezing, and post nasal drip that began 3 days ago. She denies sinus pressure, headaches, and sore throat. She has been taking OTC antihistamines and Tylenol Cold and Flu. She sleeps with a CPAP.    Sinus Problem  This is a new problem. The current episode started in the past 7 days. The problem has been gradually worsening since onset. There has been no fever. Her pain is at a severity of 0/10. She is experiencing no pain. Associated symptoms include congestion, coughing and sneezing. Pertinent negatives include no chills, diaphoresis, ear pain, headaches, hoarse voice, neck pain, shortness of breath, sinus pressure, sore throat or swollen glands. Past treatments include acetaminophen and oral decongestants.       Constitution: Negative for chills and sweating.   HENT:  Positive for congestion. Negative for ear pain, sinus pressure and sore throat.    Neck: Negative for neck pain.   Respiratory:  Positive for cough. Negative for shortness of breath.    Allergic/Immunologic: Positive for sneezing.   Neurological:  Negative for headaches.      Objective:     Vitals:    04/04/24 1842   BP: 138/68   Pulse: 87   Resp: 16   Temp: 98.3 °F (36.8 °C)       Physical Exam   Constitutional: She is oriented to person, place, and time. She appears well-developed. She is cooperative.  Non-toxic appearance. She appears ill. No distress. awake  HENT:   Head: Normocephalic and atraumatic.   Ears:   Right Ear: Hearing and external ear normal. No middle ear effusion.   Left Ear: Hearing and external ear normal.  No middle ear effusion.   Nose: Congestion present. "   Mouth/Throat: Mucous membranes are moist. No oropharyngeal exudate or posterior oropharyngeal erythema.   Eyes: Right eye visual fields normal and left eye visual fields normal. Conjunctivae and EOM are normal. Pupils are equal, round, and reactive to light. Right eye exhibits discharge. Left eye exhibits discharge. No scleral icterus. Right eye exhibits no nystagmus. Left eye exhibits no nystagmus. Extraocular movement intact vision grossly intact gaze aligned appropriately periorbital hyperpigmentation      Comments: Watery eyes bilateral   Neck: Trachea normal. Neck supple. No Brudzinski's sign and no Kernig's sign noted.      Comments: Moderate PND No neck rigidity present.   Cardiovascular: Normal rate, regular rhythm, normal heart sounds and normal pulses.   Pulmonary/Chest: Effort normal and breath sounds normal. No accessory muscle usage or stridor. No respiratory distress. She has no wheezes. She has no rales. She exhibits no tenderness.   (sounds congested, nasally)         Comments: (sounds congested, nasally)    Abdominal: Bowel sounds are normal. She exhibits no distension. Soft. There is no guarding.   Musculoskeletal:      Right lower leg: No edema.      Left lower leg: No edema.   Neurological: She is alert, oriented to person, place, and time and at baseline.   Skin: Skin is warm, dry, not diaphoretic and no rash. Capillary refill takes less than 2 seconds.   Psychiatric: She experiences Normal attention and Normal perception. Her speech is normal and behavior is normal. Mood, memory, affect, judgment and thought content normal. Cognition normal  Nursing note and vitals reviewed.      Assessment:     1. Acute non-recurrent pansinusitis    2. Sinus congestion    3. Cough with congestion of paranasal sinus    4. Sneezing      Results for orders placed or performed in visit on 04/04/24   SARS Coronavirus 2 Antigen, POCT Manual Read   Result Value Ref Range    SARS Coronavirus 2 Antigen Negative  Negative     Acceptable Yes        Plan:       Acute non-recurrent pansinusitis  -     Discontinue: pyrilamine-phenylephrine-DM 12.5-5-7.5 mg/5 mL Liqd; Take 10 mLs by mouth 2 (two) times a day. for 10 days  Dispense: 473 mL; Refill: 0  -     pyrilamine-phenylephrine-DM 12.5-5-7.5 mg/5 mL Liqd; Take 10 mLs by mouth 2 (two) times a day. for 10 days  Dispense: 473 mL; Refill: 0    Sinus congestion  -     SARS Coronavirus 2 Antigen, POCT Manual Read    Cough with congestion of paranasal sinus    Sneezing          Medical Decision Making:   Initial Assessment:   VSS  Clinical Tests:   Lab Tests: Ordered and Reviewed       <> Summary of Lab: Covid neg   Urgent Care Management:    - Educated patient regarding medications for symptomatic relief (outlined below).  - Strict ED precautions given for any emergent symptoms.      I have discussed the diagnosis, treatment plan and recommendations for follow-up with primary care, and patient/guardian verbalized understanding and is agreeable to the plan.   AVS printed and given to patient/guardian upon discharge with information regarding this visit. All questions were addressed prior to discharge.             Patient Instructions   VIRAL URI: OVER THE COUNTER RECOMMENDATIONS/SUGGESTIONS--if needed      SORE THROAT:    You may gargle with hot salt water 4 times a day for the next 2 days once to twice daily to alleviate some of your throat discomfort AND/OR post nasal drip.  Drink plenty of fluids; recommend warm tea with honey.     YOU MAY USE OVER-THE-COUNTER CEPACOL FOR SOOTHING OF YOUR THROAT.  You may wish to avoid spicy food, citrus fruits, and red sauces- as this may irritate the throat more.    COUGH:      Make sure you are getting rest and drinking lots of fluids.    You can use cough drops (recommend ricola lemon mint honey) or Cepacol to soothe your sore throat.     You can also take Elderberry and/or Emergen-C (vitamin C) to help boost your immune  system.      RX POLYTUSSIN AS DIRECTED To help with runny nose/sneezing/sore throat/cough.   Honey is a natural cough suppressant that can be used.    If your symptoms do not improve, you should return to this clinic. If your symptoms worsen, go to the emergency room.         CONGESTION:  Make sure to stay well hydrated.    Use Nasal Saline to mechanically move any post nasal drip from your eustachian tube or from the back of your throat.    How do you use a Nasal Spray? to help with fluid behind ears/congestion/post nasal drip (one spray each nostril twice daily OR two sprays each nostril once daily.    Make sure you understand your dosing instructions. Spray only the number of prescribed sprays in each nostril. Read the package instructions before using your spray the first time.    Most sprays suggest the following steps:    Wash your hands well.    Gently blow your nose to clear the passageway.    Shake the container several times.    Tilt your head slightly downward.  Use the opposite hand from the nostril you will be spraying to hold the spray bottle.    Block one nostril with your finger.  Insert the nasal applicator into the other nostril.    Aim the spray toward the outer wall of the nostril.  Inhale slowly through the nose and press the .    Breathe out and repeat to apply the prescribed number of sprays.  Repeat these steps for the other nostril.     Avoid sneezing or blowing your nose right after spraying.           PAIN/DISCOMFORT:  Tylenol up to 4,000 mg a day is safe for short periods and can be used for headache, body aches, pain, and fever. However in high doses and prolonged use it can cause liver irritation.    Ibuprofen is a non-steroidal anti-inflammatory that can be used for headache, body aches, pain, and fever. However it can also cause stomach irritation if over used.        If you have been discharged from the clinic prior to your point of care test results being completed,  please make sure to check your [facility] account.  If there is a change in treatment, we will communicate with you through here.  If your test is positive, and medications are ordered, these will be sent to your preferred pharmacy.   If your test is negative, no further steps needed. If you do not hear from us or have questions, please call the clinic.      - You must understand that you have received an Urgent Care treatment only and that you may be released before all of your medical problems are known or treated.   - You, the patient, will arrange for follow up care as instructed with your primary care provider or recommended specialist.   - If your condition worsens or fails to improve we recommend that you receive another evaluation at the ER immediately or contact your PCP to discuss your concerns, or return here.   - Please do not drive or make any important decisions for 24 hours if you have received any pain medications, sedatives or mood altering drugs during your visit.    Disclaimer: This document was drafted with the use of a voice recognition device and is likely to have sound alike errors.

## 2024-04-05 NOTE — PATIENT INSTRUCTIONS
VIRAL URI: OVER THE COUNTER RECOMMENDATIONS/SUGGESTIONS--if needed      SORE THROAT:    You may gargle with hot salt water 4 times a day for the next 2 days once to twice daily to alleviate some of your throat discomfort AND/OR post nasal drip.  Drink plenty of fluids; recommend warm tea with honey.     YOU MAY USE OVER-THE-COUNTER CEPACOL FOR SOOTHING OF YOUR THROAT.  You may wish to avoid spicy food, citrus fruits, and red sauces- as this may irritate the throat more.    COUGH:      Make sure you are getting rest and drinking lots of fluids.    You can use cough drops (recommend ricola lemon mint honey) or Cepacol to soothe your sore throat.     You can also take Elderberry and/or Emergen-C (vitamin C) to help boost your immune system.      RX POLYTUSSIN AS DIRECTED To help with runny nose/sneezing/sore throat/cough.   Honey is a natural cough suppressant that can be used.    If your symptoms do not improve, you should return to this clinic. If your symptoms worsen, go to the emergency room.         CONGESTION:  Make sure to stay well hydrated.    Use Nasal Saline to mechanically move any post nasal drip from your eustachian tube or from the back of your throat.    How do you use a Nasal Spray? to help with fluid behind ears/congestion/post nasal drip (one spray each nostril twice daily OR two sprays each nostril once daily.    Make sure you understand your dosing instructions. Spray only the number of prescribed sprays in each nostril. Read the package instructions before using your spray the first time.    Most sprays suggest the following steps:    Wash your hands well.    Gently blow your nose to clear the passageway.    Shake the container several times.    Tilt your head slightly downward.  Use the opposite hand from the nostril you will be spraying to hold the spray bottle.    Block one nostril with your finger.  Insert the nasal applicator into the other nostril.    Aim the spray toward the outer wall of  the nostril.  Inhale slowly through the nose and press the .    Breathe out and repeat to apply the prescribed number of sprays.  Repeat these steps for the other nostril.     Avoid sneezing or blowing your nose right after spraying.           PAIN/DISCOMFORT:  Tylenol up to 4,000 mg a day is safe for short periods and can be used for headache, body aches, pain, and fever. However in high doses and prolonged use it can cause liver irritation.    Ibuprofen is a non-steroidal anti-inflammatory that can be used for headache, body aches, pain, and fever. However it can also cause stomach irritation if over used.        If you have been discharged from the clinic prior to your point of care test results being completed, please make sure to check your [facility] account.  If there is a change in treatment, we will communicate with you through here.  If your test is positive, and medications are ordered, these will be sent to your preferred pharmacy.   If your test is negative, no further steps needed. If you do not hear from us or have questions, please call the clinic.      - You must understand that you have received an Urgent Care treatment only and that you may be released before all of your medical problems are known or treated.   - You, the patient, will arrange for follow up care as instructed with your primary care provider or recommended specialist.   - If your condition worsens or fails to improve we recommend that you receive another evaluation at the ER immediately or contact your PCP to discuss your concerns, or return here.   - Please do not drive or make any important decisions for 24 hours if you have received any pain medications, sedatives or mood altering drugs during your visit.    Disclaimer: This document was drafted with the use of a voice recognition device and is likely to have sound alike errors.

## 2024-06-16 DIAGNOSIS — M25.561 CHRONIC PAIN OF BOTH KNEES: ICD-10-CM

## 2024-06-16 DIAGNOSIS — G89.29 CHRONIC PAIN OF BOTH KNEES: ICD-10-CM

## 2024-06-16 DIAGNOSIS — M25.562 CHRONIC PAIN OF BOTH KNEES: ICD-10-CM

## 2024-06-16 NOTE — TELEPHONE ENCOUNTER
Care Due:                  Date            Visit Type   Department     Provider  --------------------------------------------------------------------------------                                EP -                              PRIMARY      [facility]  Gauri  Last Visit: 02-      CARE ([facility])   MEDICINE       [facility]  Next Visit: None Scheduled  None         None Found                                                            Last  Test          Frequency    Reason                     Performed    Due Date  --------------------------------------------------------------------------------    HBA1C.......  6 months...  metFORMIN................  02- 08-    Health [facility] Embedded Care Due Messages. Reference number: [id number].   6/16/2024 7:52:48 AM CDT

## 2024-06-17 RX ORDER — MELOXICAM 15 MG/1
15 TABLET ORAL DAILY PRN
Qty: 30 TABLET | Refills: 0 | Status: SHIPPED | OUTPATIENT
Start: 2024-06-17

## 2024-07-14 DIAGNOSIS — M25.562 CHRONIC PAIN OF BOTH KNEES: ICD-10-CM

## 2024-07-14 DIAGNOSIS — G89.29 CHRONIC PAIN OF BOTH KNEES: ICD-10-CM

## 2024-07-14 DIAGNOSIS — M25.561 CHRONIC PAIN OF BOTH KNEES: ICD-10-CM

## 2024-07-14 NOTE — TELEPHONE ENCOUNTER
No care due was identified.  Health [facility] Embedded Care Due Messages. Reference number: [id number].   7/14/2024 7:52:02 AM CDT

## 2024-07-15 RX ORDER — MELOXICAM 15 MG/1
15 TABLET ORAL DAILY PRN
Qty: 30 TABLET | Refills: 0 | Status: SHIPPED | OUTPATIENT
Start: 2024-07-15

## 2024-07-15 NOTE — TELEPHONE ENCOUNTER
Problem: Safety  Goal: Will remain free from injury  Outcome: PROGRESSING AS EXPECTED   Bed alarm in place    Problem: Infection  Goal: Will remain free from infection  Outcome: PROGRESSING AS EXPECTED   Hand hygiene practiced by all staff   L/v: 2/19/24  F/u: 0  Rx Prev Loc: [id number]

## 2024-08-13 DIAGNOSIS — M25.561 CHRONIC PAIN OF BOTH KNEES: ICD-10-CM

## 2024-08-13 DIAGNOSIS — M25.562 CHRONIC PAIN OF BOTH KNEES: ICD-10-CM

## 2024-08-13 DIAGNOSIS — G89.29 CHRONIC PAIN OF BOTH KNEES: ICD-10-CM

## 2024-08-13 RX ORDER — MELOXICAM 15 MG/1
15 TABLET ORAL DAILY PRN
Qty: 30 TABLET | Refills: 3 | Status: SHIPPED | OUTPATIENT
Start: 2024-08-13

## 2024-08-13 NOTE — TELEPHONE ENCOUNTER
Refill Routing Note   Medication(s) are not appropriate for processing by [facility] for the following reason(s):        Outside of protocol    [facility] action(s):  Route             Appointments  past 12m or future 3m with PCP    Date Provider   Last Visit   2/19/2024 Gauri Naidu MD   Next Visit   Visit date not found Gauri Naidu MD   ED visits in past 90 days: 0        Note composed:10:18 AM 08/13/2024

## 2024-08-13 NOTE — TELEPHONE ENCOUNTER
No care due was identified.  Health [facility] Embedded Care Due Messages. Reference number: [id number].   8/13/2024 6:55:24 AM CDT

## 2024-10-22 ENCOUNTER — TELEPHONE (OUTPATIENT)
Dept: INTERNAL MEDICINE | Facility: CLINIC | Age: 64
End: 2024-10-22
Payer: COMMERCIAL

## 2024-10-22 NOTE — TELEPHONE ENCOUNTER
Pt calling needing an appt due to knee pain in both knees and wanting a referral for a mammogram . I inform pt that [facility] has some availabilities on tomorrow 10/23/24 . T excepted appt time 4:20 pm tomorrow 10/23/24

## 2024-10-22 NOTE — TELEPHONE ENCOUNTER
----- Message from Virginie sent at 10/22/2024 10:51 AM CDT -----  Contact: self   Patient needs a call back. She is calling to have orders placed for a Mammogram as well as needing to talk to doctor about her left knee issue and maybe getting a knee replacement. Please call to advise

## 2024-10-23 ENCOUNTER — OFFICE VISIT (OUTPATIENT)
Dept: INTERNAL MEDICINE | Facility: CLINIC | Age: 64
End: 2024-10-23
Payer: COMMERCIAL

## 2024-10-23 VITALS
RESPIRATION RATE: 15 BRPM | TEMPERATURE: 98 F | HEIGHT: 64 IN | OXYGEN SATURATION: 95 % | HEART RATE: 101 BPM | SYSTOLIC BLOOD PRESSURE: 130 MMHG | BODY MASS INDEX: 28.24 KG/M2 | WEIGHT: 165.38 LBS | DIASTOLIC BLOOD PRESSURE: 78 MMHG

## 2024-10-23 DIAGNOSIS — M17.0 OSTEOARTHRITIS OF BOTH KNEES, UNSPECIFIED OSTEOARTHRITIS TYPE: ICD-10-CM

## 2024-10-23 DIAGNOSIS — G89.29 CHRONIC PAIN OF BOTH KNEES: Primary | ICD-10-CM

## 2024-10-23 DIAGNOSIS — Z12.31 BREAST CANCER SCREENING BY MAMMOGRAM: ICD-10-CM

## 2024-10-23 DIAGNOSIS — M25.562 CHRONIC PAIN OF BOTH KNEES: Primary | ICD-10-CM

## 2024-10-23 DIAGNOSIS — M25.561 CHRONIC PAIN OF BOTH KNEES: Primary | ICD-10-CM

## 2024-10-23 PROCEDURE — 1160F RVW MEDS BY RX/DR IN RCRD: CPT | Mod: CPTII,S$GLB,,

## 2024-10-23 PROCEDURE — 3008F BODY MASS INDEX DOCD: CPT | Mod: CPTII,S$GLB,,

## 2024-10-23 PROCEDURE — 99213 OFFICE O/P EST LOW 20 MIN: CPT | Mod: S$GLB,,,

## 2024-10-23 PROCEDURE — 1159F MED LIST DOCD IN RCRD: CPT | Mod: CPTII,S$GLB,,

## 2024-10-23 PROCEDURE — 3078F DIAST BP <80 MM HG: CPT | Mod: CPTII,S$GLB,,

## 2024-10-23 PROCEDURE — 99999 PR PBB SHADOW E&M-EST. PATIENT-LVL V: CPT | Mod: PBBFAC,,,

## 2024-10-23 PROCEDURE — 3075F SYST BP GE 130 - 139MM HG: CPT | Mod: CPTII,S$GLB,,

## 2024-10-23 PROCEDURE — 3044F HG A1C LEVEL LT 7.0%: CPT | Mod: CPTII,S$GLB,,

## 2024-10-23 NOTE — PROGRESS NOTES
"Subjective:       Patient ID: Shilrey Amaya is a 64 y.o. female.    Chief Complaint: Knee Pain (Bilateral )    HPI    64-year-old female with past medical history of YUDITH, osteopenia, prediabetes, hyperlipidemia, chronic knee pain presents for bilateral knee pain.  Onset of knee pain 2020.  Patient saw Dr. Hatfield in June of 2020, diagnosed with left knee arthritis and genu varum of the left lower extremity.  Patient was under his care for 1 year while receiving knee injections.  Since then patient had been taking meloxicam daily, however stopped providing relief the patient has stopped medication.  Patient states the pain is constant and gradually worsening, left knee worse than the right.  Reports the feeling of a toothache in bilateral knees.  Beginning to affect patient's gait.  Pain is worse after sitting for long periods and then trying to stand up and standing up for long periods.  Patient using Tylenol and ibuprofen with no relief.  Patient reports knees locking at times and crunching.    /78 (BP Location: Left arm, Patient Position: Sitting)   Pulse 101   Temp 97.9 °F (36.6 °C) (Tympanic)   Resp 15   Ht 5' 4" (1.626 m)   Wt 75 kg (165 lb 5.5 oz)   LMP  (LMP Unknown)   SpO2 95%   BMI 28.38 kg/m²     Review of Systems   Musculoskeletal:  Positive for arthralgias.   All other systems reviewed and are negative.      Objective:      Physical Exam  Vitals reviewed.   Constitutional:       Appearance: Normal appearance.   HENT:      Head: Normocephalic and atraumatic.   Eyes:      Pupils: Pupils are equal, round, and reactive to light.   Cardiovascular:      Rate and Rhythm: Normal rate and regular rhythm.      Pulses:           Radial pulses are 2+ on the right side and 2+ on the left side.        Dorsalis pedis pulses are 2+ on the right side and 2+ on the left side.   Pulmonary:      Effort: Pulmonary effort is normal.      Breath sounds: Normal breath sounds.   Abdominal:      General: " Bowel sounds are normal.      Palpations: Abdomen is soft.   Musculoskeletal:         General: Tenderness present. No swelling.      Cervical back: Normal range of motion and neck supple.      Right knee: Bony tenderness and crepitus present. No swelling or erythema.      Left knee: Bony tenderness and crepitus present. No swelling or erythema.      Right lower leg: No edema.      Left lower leg: No edema.        Legs:       Comments: Location of pain   Skin:     General: Skin is warm and dry.   Neurological:      Mental Status: She is alert and oriented to person, place, and time.   Psychiatric:         Mood and Affect: Mood normal.         Behavior: Behavior normal.         Assessment:       1. Chronic pain of both knees    2. Breast cancer screening by mammogram        Plan:       Shirley was seen today for knee pain.    Patient has exhausted all knee pain treatments at this point.  Patient requesting referral to Dr. Hatfield for possible total knee replacement consult.    Diagnoses and all orders for this visit:    Chronic pain of both knees  -     X-Ray Knee 1 or 2 View Bilateral; Future  -     Ambulatory referral/consult to Orthopedics; Future    Breast cancer screening by mammogram  -     Mammo Digital Screening Bilat w/ Rich; Future    Follow up in about 4 months (around 2/23/2025) for wellness. Or sooner as needed.

## 2024-10-28 ENCOUNTER — PATIENT MESSAGE (OUTPATIENT)
Dept: INTERNAL MEDICINE | Facility: CLINIC | Age: 64
End: 2024-10-28
Payer: COMMERCIAL

## 2024-10-28 ENCOUNTER — HOSPITAL ENCOUNTER (OUTPATIENT)
Dept: RADIOLOGY | Facility: HOSPITAL | Age: 64
Discharge: HOME | End: 2024-10-28
Payer: COMMERCIAL

## 2024-10-28 DIAGNOSIS — G89.29 CHRONIC PAIN OF BOTH KNEES: ICD-10-CM

## 2024-10-28 DIAGNOSIS — M25.562 CHRONIC PAIN OF BOTH KNEES: ICD-10-CM

## 2024-10-28 DIAGNOSIS — M25.561 CHRONIC PAIN OF BOTH KNEES: ICD-10-CM

## 2024-10-28 PROCEDURE — 73562 X-RAY EXAM OF KNEE 3: CPT | Mod: TC,50,PN

## 2024-10-28 PROCEDURE — 73562 X-RAY EXAM OF KNEE 3: CPT | Mod: 26,50,, | Performed by: RADIOLOGY

## 2024-11-01 ENCOUNTER — HOSPITAL ENCOUNTER (OUTPATIENT)
Dept: RADIOLOGY | Facility: HOSPITAL | Age: 64
Discharge: HOME | End: 2024-11-01
Payer: COMMERCIAL

## 2024-11-01 DIAGNOSIS — Z12.31 BREAST CANCER SCREENING BY MAMMOGRAM: ICD-10-CM

## 2024-11-01 PROCEDURE — 77063 BREAST TOMOSYNTHESIS BI: CPT | Mod: 26,,, | Performed by: RADIOLOGY

## 2024-11-01 PROCEDURE — 77067 SCR MAMMO BI INCL CAD: CPT | Mod: 26,,, | Performed by: RADIOLOGY

## 2024-11-01 PROCEDURE — 77063 BREAST TOMOSYNTHESIS BI: CPT | Mod: TC,PN

## 2024-11-01 PROCEDURE — 77067 SCR MAMMO BI INCL CAD: CPT | Mod: TC,PN

## 2024-11-04 ENCOUNTER — PATIENT MESSAGE (OUTPATIENT)
Dept: INTERNAL MEDICINE | Facility: CLINIC | Age: 64
End: 2024-11-04
Payer: COMMERCIAL

## 2024-11-04 ENCOUNTER — OFFICE VISIT (OUTPATIENT)
Dept: SPORTS MEDICINE | Facility: CLINIC | Age: 64
End: 2024-11-04
Payer: COMMERCIAL

## 2024-11-04 VITALS — HEIGHT: 64 IN | WEIGHT: 165.38 LBS | BODY MASS INDEX: 28.24 KG/M2

## 2024-11-04 DIAGNOSIS — M17.12 PRIMARY OSTEOARTHRITIS OF LEFT KNEE: ICD-10-CM

## 2024-11-04 DIAGNOSIS — M17.11 PRIMARY OSTEOARTHRITIS OF RIGHT KNEE: Primary | ICD-10-CM

## 2024-11-04 DIAGNOSIS — M25.561 CHRONIC PAIN OF BOTH KNEES: ICD-10-CM

## 2024-11-04 DIAGNOSIS — M25.562 CHRONIC PAIN OF BOTH KNEES: ICD-10-CM

## 2024-11-04 DIAGNOSIS — G89.29 CHRONIC PAIN OF BOTH KNEES: ICD-10-CM

## 2024-11-04 PROCEDURE — 3044F HG A1C LEVEL LT 7.0%: CPT | Mod: CPTII,S$GLB,, | Performed by: STUDENT IN AN ORGANIZED HEALTH CARE EDUCATION/TRAINING PROGRAM

## 2024-11-04 PROCEDURE — 1159F MED LIST DOCD IN RCRD: CPT | Mod: CPTII,S$GLB,, | Performed by: STUDENT IN AN ORGANIZED HEALTH CARE EDUCATION/TRAINING PROGRAM

## 2024-11-04 PROCEDURE — 99203 OFFICE O/P NEW LOW 30 MIN: CPT | Mod: S$GLB,,, | Performed by: STUDENT IN AN ORGANIZED HEALTH CARE EDUCATION/TRAINING PROGRAM

## 2024-11-04 PROCEDURE — 3008F BODY MASS INDEX DOCD: CPT | Mod: CPTII,S$GLB,, | Performed by: STUDENT IN AN ORGANIZED HEALTH CARE EDUCATION/TRAINING PROGRAM

## 2024-11-04 PROCEDURE — 99999 PR PBB SHADOW E&M-EST. PATIENT-LVL III: CPT | Mod: PBBFAC,,, | Performed by: STUDENT IN AN ORGANIZED HEALTH CARE EDUCATION/TRAINING PROGRAM

## 2024-11-04 NOTE — PROGRESS NOTES
Patient ID: Shirley Amaya  YOB: 1960  MRN: [id number]    Chief Complaint: Pain of the Right Knee and Pain of the Left Knee      Referred By:  Primary care for bilateral knee pain left greater than right    History of Present Illness: Shirley Amaya is a  64 y.o. female who presents today with bilateral knee pain.     The patient is active in none.  Occupation:  Newspaper    64-year-old female presenting today for bilateral knee pain left greater than right.  She has had knee pain for many years denies any previous injuries falls traumas associated.  Was seen about 4 years ago in the Orthopedics Department and had corticosteroid and gel shots done at that time and did not have much relief.  She is interested in surgical consultation as she has heard multiple friends recently have done well she is not interested in any other injections today.  Pain worse after increased activity.  She enjoys camping and working outside in the [place] with her  but is limited due to the pain that she normally gets in her knees.  Does have some pain at night as well and can not lay with her knees touching.  Is not taking any medicine except some over-the-counters as needed.    Past Medical History:   Past Medical History:   Diagnosis Date    Abnormal glucose     Hyperlipidemia     OA (osteoarthritis) of knee     YUDITH on CPAP      Past Surgical History:   Procedure Laterality Date    CERVICAL CONIZATION   W/ LASER      COLONOSCOPY N/A 11/30/2016    Procedure: COLONOSCOPY;  Surgeon: Mary Victor MD;  Location: [facility] ENDO;  Service: Endoscopy;  Laterality: N/A;    COLONOSCOPY N/A 02/28/2022    Procedure: COLONOSCOPY;  Surgeon: Andrae Barger MD;  Location: [facility] ENDO;  Service: Endoscopy;  Laterality: N/A;     Family History   Problem Relation Name Age of Onset    Diabetes Father      Lung cancer Sister      Stomach cancer Brother      Heart disease Neg Hx      Hypertension Neg Hx       Colon cancer Neg Hx      Breast cancer Neg Hx      Ovarian cancer Neg Hx       Social History     Socioeconomic History    Marital status:     Number of children: 2   Occupational History    Occupation: [employer]aper   Tobacco Use    Smoking status: Former     Current packs/day: 0.00     Average packs/day: 2.0 packs/day for 25.0 years (50.0 ttl pk-yrs)     Types: Cigarettes     Start date: 1975     Quit date: 2000     Years since quittin.5    Smokeless tobacco: Never   Substance and Sexual Activity    Alcohol use: No    Drug use: No    Sexual activity: Yes     Partners: Male     Birth control/protection: Post-menopausal     Social Drivers of Health     Financial Resource Strain: Patient Declined (2024)    Overall Financial Resource Strain (CARDIA)     Difficulty of Paying Living Expenses: Patient declined   Food Insecurity: Patient Declined (2024)    Hunger Vital Sign     Worried About Running Out of Food in the Last Year: Patient declined     Ran Out of Food in the Last Year: Patient declined   Transportation Needs: Patient Declined (2024)    PRAPARE - Transportation     Lack of Transportation (Medical): Patient declined     Lack of Transportation (Non-Medical): Patient declined   Physical Activity: Insufficiently Active (2024)    Exercise Vital Sign     Days of Exercise per Week: 3 days     Minutes of Exercise per Session: 20 min   Stress: No Stress Concern Present (2024)    [employer] [place] [employer] - Occupational Stress Questionnaire     Feeling of Stress : Not at all   Housing Stability: Patient Declined (2024)    Housing Stability Vital Sign     Unable to Pay for Housing in the Last Year: Patient declined     Unstable Housing in the Last Year: Patient declined     Medication List with Changes/Refills   Current Medications    ATORVASTATIN (LIPITOR) 20 MG TABLET    Take 1 tablet (20 mg total) by mouth once daily.    CLOBETASOL 0.05% (TEMOVATE) 0.05 %  OINT    Apply topically nightly as needed (itching). Do not use for more than 2 weeks continously    METFORMIN (GLUCOPHAGE) 500 MG TABLET    Take 1 tablet (500 mg total) by mouth 2 (two) times daily with meals.     Review of patient's allergies indicates:  No Known Allergies    Physical Exam:   Body mass index is 28.38 kg/m².    GENERAL: Well appearing, in no acute distress.  HEAD: Normocephalic and atraumatic.  ENT: External ears and nose grossly normal.  EYES: EOMI bilaterally  PULMONARY: Respirations are grossly even and non-labored.  NEURO: Awake, alert, and oriented x 3.  SKIN: No obvious rashes appreciated.  PSYCH: Mood & affect are appropriate.    Detailed MSK exam:     Bilateral knee exam significant loss of patellar mobility patellar compression small effusion on the left no effusion on the right.  Range of motion limited loss of flexion by at least 10-15 degrees bilaterally flexion slightly less on the left than the right.  Tenderness over medial joint lines bilaterally.    Imaging:    Significant tricompartmental osteoarthritis of the bilaterally with significant loss of medial and patellofemoral joint lines  Assessment:  Shirley Amaya is a 64 y.o. female presents today for bilateral knee pain consistent with tricompartmental osteoarthritis of the knee.  She has severe arthritis in both knees and is interested in surgical consultation.  She has tried corticosteroid Mobic and gel shots a few years ago without any significant relief.  I discussed moving forward with an elective consultation for total knee arthroplasty and placed on Dr. Good schedule.  Follow-up with me as needed in the future.    Primary osteoarthritis of right knee    Chronic pain of both knees  -     Ambulatory referral/consult to Orthopedics    Primary osteoarthritis of left knee         A copy of today's visit note has been sent to the referring provider.       Derek Jimenez MD    Disclaimer: This note was prepared using a voice  recognition system and is likely to have sound alike errors within the text.

## 2024-12-03 ENCOUNTER — PATIENT MESSAGE (OUTPATIENT)
Dept: INTERNAL MEDICINE | Facility: CLINIC | Age: 64
End: 2024-12-03
Payer: COMMERCIAL

## 2024-12-03 DIAGNOSIS — R42 DIZZINESS: Primary | ICD-10-CM

## 2024-12-03 RX ORDER — MECLIZINE HYDROCHLORIDE 25 MG/1
25 TABLET ORAL 3 TIMES DAILY PRN
Qty: 30 TABLET | Refills: 1 | Status: SHIPPED | OUTPATIENT
Start: 2024-12-03

## 2024-12-20 ENCOUNTER — OFFICE VISIT (OUTPATIENT)
Dept: ORTHOPEDICS | Facility: CLINIC | Age: 64
End: 2024-12-20
Payer: COMMERCIAL

## 2024-12-20 VITALS — HEIGHT: 64 IN | WEIGHT: 168.44 LBS | BODY MASS INDEX: 28.76 KG/M2

## 2024-12-20 DIAGNOSIS — M17.12 PRIMARY OSTEOARTHRITIS OF LEFT KNEE: ICD-10-CM

## 2024-12-20 DIAGNOSIS — M17.11 PRIMARY OSTEOARTHRITIS OF RIGHT KNEE: Primary | ICD-10-CM

## 2024-12-20 PROCEDURE — 99999 PR PBB SHADOW E&M-EST. PATIENT-LVL IV: CPT | Mod: PBBFAC,,, | Performed by: ORTHOPAEDIC SURGERY

## 2024-12-20 RX ORDER — MELOXICAM 15 MG/1
15 TABLET ORAL DAILY
COMMUNITY

## 2024-12-20 NOTE — PATIENT INSTRUCTIONS
Encounter Diagnoses   Name Primary?    Primary osteoarthritis of right knee Yes    Primary osteoarthritis of left knee        ASSESSMENT:  Osteoarthritis of the bilateral knees Kellgren Freddy grade 4.  Failed all reasonable conservative treatment efforts over the last 4 years with physician directed exercises physical therapy, steroidal injections viscosupplementation anti-inflammatories analgesics with Tylenol.  She has no medical comorbidities.  She is active and ambulatory and motivated for knee replacement surgery, and strongly desires to have a bilateral surgery to decrease her recovery time.    PLAN:  Initiate preoperative protocol for bilateral knee replacement surgery.  MRI/long leg digital x-rays right and left knee for patient specific cutting block was Farhat and Aye.  Preoperative medical clearance with primary care physician Dr. Mays at [facility]  Return to office for preoperative visit prior to surgery  Patient would like to schedule surgery as soon as possible, end of January or early February but would be fine with her.  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  Patient states she has all the necessary DME he is with a walker bedside commode.  She has a double wide trailer but a ramp to get in and a walk-in shower.    FOLLOW UP:  Preop visit prior to schedule knee replacement surgery

## 2024-12-20 NOTE — PROGRESS NOTES
Subjective :  Severe bilateral knee pain with poor quality of life.    Patient ID: Shirley Amaya is a 64 y.o. female.    Chief Complaint: Pain of the Left Knee and Pain of the Right Knee      HPI:  Keisha is 64 years of age.  She presents to the office visit today with her .  She referred by Dr. Derek Jimenez at [facility].  She has undergone extensive nonoperative treatment for osteoarthritis of the bilateral knees.  She has failed conservative treatment management and is a candidate for surgical treatment with knee replacement.  She has tried knee bracing and physical therapy as well as meloxicam as an anti-inflammatory.  She had steroidal injection last in [facility] June of 2021 which did not really help either knee.  Viscosupplementation with Euflexxa in 2020 did not give her any measurable relief.    She has a elevated level of pain severe at times rated 8 to 9/10 with ambulation right and left knees at at night she has pain that with throbbing and aching that prevents quality sleep.  She has had a gradual worsening of symptoms over the last several years with stiffness and popping and grinding.  She has difficulty walking and standing long distances and can not enjoy family activities and travel.  Her pain is described as sharp and aching in nature.  The left knee is slightly worse in the right knee historically but the right knee is catching up.    REVIEW OF SYSTEMS:  No fevers chills sweats chest pains or shortness of breath.  No difficulty with balanced.  No fevers, chills, sweats.  No unexplained weight loss.  No history of gastric reflux or heartburn.  No urinary frequency urgency or incontinence.       Objective     PHYSICAL EXAMINATION:  Pleasant cooperative  female with short dark hair.  She is accompanied by her .  Walks with a antalgic gait favoring both right and left knees.  Has pain with attempt to bend or squat past 90°.  Difficulty rising out of the chair without  pushing off with both arms.  Positive pain and crepitation along the medial joint line in both knees.  Right knee range of motion -5 degrees with flexion to about 110°  Left knee range of motion -5 degrees with flexion to about 105°.  Palpable fullness and tenderness at the suprapatellar region of both knees consistent with a effusion.  Popliteal recess of both knees is mildly tender.  Neurovascularly intact both lower extremities.  Calf soft both the legs.  No peripheral edema either lower extremity.    XRAY:  X-rays [facility] bilateral knees three-view weight-bearing lateral and sunrise October 08/20/2024   Right knee end-stage arthritis bone-on-bone with varus deformity sclerosis and large osteophytes (Kellgren Freddy grade 4$  Left knee end-stage arthritis bone-to-bone with varus deformity, sclerosis and large osteophytes (Kellgren Freddy grade 4)       Assessment and Plan     Patient Instructions     Encounter Diagnoses   Name Primary?    Primary osteoarthritis of right knee Yes    Primary osteoarthritis of left knee        ASSESSMENT:  Osteoarthritis of the bilateral knees Kellgren Freddy grade 4.  Failed all reasonable conservative treatment efforts over the last 4 years with physician directed exercises physical therapy, steroidal injections viscosupplementation anti-inflammatories analgesics with Tylenol.  She has no medical comorbidities.  She is active and ambulatory and motivated for knee replacement surgery, and strongly desires to have a bilateral surgery to decrease her recovery time.    PLAN:  Initiate preoperative protocol for bilateral knee replacement surgery.  MRI/long leg digital x-rays right and left knee for patient specific cutting block was Deepti.  Preoperative medical clearance with primary care physician Dr. Mays at [facility]  Return to office for preoperative visit prior to surgery  Patient would like to schedule surgery as soon as possible, end of  January or early February but would be fine with her.  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  Patient states she has all the necessary DME he is with a walker bedside commode.  She has a double wide trailer but a ramp to get in and a walk-in shower.    FOLLOW UP:  Preop visit prior to schedule knee replacement surgery     55 minute visit with the patient and her  discussing indications risks and benefits of knee replacement surgery.  Discussed anesthesia options I recommend a spinal anesthetic.  Reviewed previous records and x-rays from her primary care physician and sports medicine physician at [facility].         No follow-ups on file.

## 2024-12-30 ENCOUNTER — TELEPHONE (OUTPATIENT)
Dept: ORTHOPEDICS | Facility: CLINIC | Age: 64
End: 2024-12-30
Payer: COMMERCIAL

## 2024-12-30 ENCOUNTER — HOSPITAL ENCOUNTER (OUTPATIENT)
Dept: RADIOLOGY | Facility: HOSPITAL | Age: 64
Discharge: HOME | End: 2024-12-30
Attending: ORTHOPAEDIC SURGERY
Payer: COMMERCIAL

## 2024-12-30 DIAGNOSIS — M17.11 PRIMARY OSTEOARTHRITIS OF RIGHT KNEE: ICD-10-CM

## 2024-12-30 DIAGNOSIS — M17.12 PRIMARY OSTEOARTHRITIS OF LEFT KNEE: ICD-10-CM

## 2024-12-30 DIAGNOSIS — M17.12 PRIMARY OSTEOARTHRITIS OF LEFT KNEE: Primary | ICD-10-CM

## 2024-12-30 PROCEDURE — 71046 X-RAY EXAM CHEST 2 VIEWS: CPT | Mod: 26,,, | Performed by: STUDENT IN AN ORGANIZED HEALTH CARE EDUCATION/TRAINING PROGRAM

## 2024-12-30 PROCEDURE — 71046 X-RAY EXAM CHEST 2 VIEWS: CPT | Mod: TC,PN

## 2024-12-30 NOTE — TELEPHONE ENCOUNTER
----- Message from Corby sent at 12/30/2024  3:16 PM CST -----  Contact: patient  Type:  Requesting Orders     Who Called: Shirley Amaya   Would the patient rather a call back or a response via [facility]? Call   Best Call Back Number:  [phone]   Additional Information:  pt is requesting the order for her swab to be put back in the system

## 2025-01-02 ENCOUNTER — HOSPITAL ENCOUNTER (OUTPATIENT)
Dept: RADIOLOGY | Facility: HOSPITAL | Age: 65
Discharge: HOME | End: 2025-01-02
Attending: ORTHOPAEDIC SURGERY
Payer: COMMERCIAL

## 2025-01-02 ENCOUNTER — HOSPITAL ENCOUNTER (OUTPATIENT)
Dept: CARDIOLOGY | Facility: HOSPITAL | Age: 65
Discharge: HOME | End: 2025-01-02
Attending: ORTHOPAEDIC SURGERY
Payer: COMMERCIAL

## 2025-01-02 DIAGNOSIS — M17.11 PRIMARY OSTEOARTHRITIS OF RIGHT KNEE: ICD-10-CM

## 2025-01-02 DIAGNOSIS — M17.12 PRIMARY OSTEOARTHRITIS OF LEFT KNEE: ICD-10-CM

## 2025-01-02 DIAGNOSIS — M17.12 PRIMARY OSTEOARTHRITIS OF LEFT KNEE: Primary | ICD-10-CM

## 2025-01-02 LAB
OHS QRS DURATION: 102 MS
OHS QTC CALCULATION: 434 MS

## 2025-01-02 PROCEDURE — 73721 MRI JNT OF LWR EXTRE W/O DYE: CPT | Mod: TC,RT

## 2025-01-02 PROCEDURE — 77073 BONE LENGTH STUDIES: CPT | Mod: TC

## 2025-01-02 PROCEDURE — 93005 ELECTROCARDIOGRAM TRACING: CPT

## 2025-01-02 PROCEDURE — 93010 ELECTROCARDIOGRAM REPORT: CPT | Mod: ,,, | Performed by: INTERNAL MEDICINE

## 2025-01-02 PROCEDURE — 73721 MRI JNT OF LWR EXTRE W/O DYE: CPT | Mod: TC,LT

## 2025-01-02 PROCEDURE — 77073 BONE LENGTH STUDIES: CPT | Mod: 26,,, | Performed by: RADIOLOGY

## 2025-01-02 PROCEDURE — 73721 MRI JNT OF LWR EXTRE W/O DYE: CPT | Mod: 26,RT,, | Performed by: RADIOLOGY

## 2025-01-06 DIAGNOSIS — M17.12 PRIMARY OSTEOARTHRITIS OF LEFT KNEE: ICD-10-CM

## 2025-01-06 DIAGNOSIS — M17.11 PRIMARY OSTEOARTHRITIS OF RIGHT KNEE: Primary | ICD-10-CM

## 2025-01-13 ENCOUNTER — PATIENT MESSAGE (OUTPATIENT)
Dept: ORTHOPEDICS | Facility: CLINIC | Age: 65
End: 2025-01-13
Payer: COMMERCIAL

## 2025-01-13 DIAGNOSIS — Z01.818 PRE-OP EXAM: Primary | ICD-10-CM

## 2025-01-27 ENCOUNTER — CLINICAL SUPPORT (OUTPATIENT)
Dept: REHABILITATION | Facility: HOSPITAL | Age: 65
End: 2025-01-27
Attending: ORTHOPAEDIC SURGERY
Payer: COMMERCIAL

## 2025-01-27 DIAGNOSIS — M17.11 PRIMARY OSTEOARTHRITIS OF RIGHT KNEE: ICD-10-CM

## 2025-01-27 DIAGNOSIS — M17.12 PRIMARY OSTEOARTHRITIS OF LEFT KNEE: ICD-10-CM

## 2025-01-27 DIAGNOSIS — M17.0 BILATERAL PRIMARY OSTEOARTHRITIS OF KNEE: Primary | ICD-10-CM

## 2025-01-27 PROCEDURE — 97110 THERAPEUTIC EXERCISES: CPT | Mod: PN

## 2025-01-27 PROCEDURE — 97162 PT EVAL MOD COMPLEX 30 MIN: CPT | Mod: PN

## 2025-01-27 PROCEDURE — 97530 THERAPEUTIC ACTIVITIES: CPT | Mod: PN

## 2025-01-28 ENCOUNTER — TELEPHONE (OUTPATIENT)
Dept: ORTHOPEDICS | Facility: CLINIC | Age: 65
End: 2025-01-28

## 2025-01-28 ENCOUNTER — LAB VISIT (OUTPATIENT)
Dept: LAB | Facility: HOSPITAL | Age: 65
End: 2025-01-28
Attending: ORTHOPAEDIC SURGERY
Payer: COMMERCIAL

## 2025-01-28 ENCOUNTER — OFFICE VISIT (OUTPATIENT)
Dept: ORTHOPEDICS | Facility: CLINIC | Age: 65
End: 2025-01-28
Payer: COMMERCIAL

## 2025-01-28 VITALS — HEIGHT: 64 IN | WEIGHT: 168.44 LBS | BODY MASS INDEX: 28.76 KG/M2

## 2025-01-28 DIAGNOSIS — M17.12 PRIMARY OSTEOARTHRITIS OF LEFT KNEE: ICD-10-CM

## 2025-01-28 DIAGNOSIS — M17.11 PRIMARY OSTEOARTHRITIS OF RIGHT KNEE: Primary | ICD-10-CM

## 2025-01-28 DIAGNOSIS — M17.11 PRIMARY OSTEOARTHRITIS OF RIGHT KNEE: ICD-10-CM

## 2025-01-28 LAB
ABO + RH BLD: NORMAL
BLD GP AB SCN CELLS X3 SERPL QL: NORMAL
SPECIMEN OUTDATE: NORMAL

## 2025-01-28 PROCEDURE — 99215 OFFICE O/P EST HI 40 MIN: CPT | Mod: S$GLB,,, | Performed by: ORTHOPAEDIC SURGERY

## 2025-01-28 PROCEDURE — 36415 COLL VENOUS BLD VENIPUNCTURE: CPT | Performed by: ORTHOPAEDIC SURGERY

## 2025-01-28 PROCEDURE — 3008F BODY MASS INDEX DOCD: CPT | Mod: CPTII,S$GLB,, | Performed by: ORTHOPAEDIC SURGERY

## 2025-01-28 PROCEDURE — 99999 PR PBB SHADOW E&M-EST. PATIENT-LVL III: CPT | Mod: PBBFAC,,, | Performed by: ORTHOPAEDIC SURGERY

## 2025-01-28 PROCEDURE — 86850 RBC ANTIBODY SCREEN: CPT | Performed by: ORTHOPAEDIC SURGERY

## 2025-01-28 PROCEDURE — 1159F MED LIST DOCD IN RCRD: CPT | Mod: CPTII,S$GLB,, | Performed by: ORTHOPAEDIC SURGERY

## 2025-01-28 RX ORDER — LEVOCETIRIZINE DIHYDROCHLORIDE 5 MG/1
5 TABLET, FILM COATED ORAL NIGHTLY
Qty: 30 TABLET | Refills: 0 | Status: ON HOLD | OUTPATIENT
Start: 2025-01-28 | End: 2026-01-28

## 2025-01-28 RX ORDER — OXYCODONE HYDROCHLORIDE 5 MG/1
5 TABLET ORAL EVERY 6 HOURS PRN
Qty: 21 TABLET | Refills: 0 | Status: ON HOLD | OUTPATIENT
Start: 2025-01-28

## 2025-01-28 RX ORDER — METHOCARBAMOL 500 MG/1
500 TABLET, FILM COATED ORAL EVERY 6 HOURS PRN
Qty: 20 TABLET | Refills: 1 | Status: ON HOLD | OUTPATIENT
Start: 2025-01-28 | End: 2025-02-07

## 2025-01-28 RX ORDER — TRAMADOL HYDROCHLORIDE 50 MG/1
50 TABLET ORAL EVERY 4 HOURS PRN
Qty: 56 TABLET | Refills: 0 | Status: CANCELLED | OUTPATIENT
Start: 2025-01-28

## 2025-01-28 RX ORDER — AMOXICILLIN 250 MG
2 CAPSULE ORAL DAILY
Qty: 40 TABLET | Refills: 1 | Status: ON HOLD | OUTPATIENT
Start: 2025-01-28

## 2025-01-28 RX ORDER — ONDANSETRON 4 MG/1
4 TABLET, ORALLY DISINTEGRATING ORAL EVERY 6 HOURS PRN
Qty: 10 TABLET | Refills: 0 | Status: ON HOLD | OUTPATIENT
Start: 2025-01-28

## 2025-01-28 NOTE — TELEPHONE ENCOUNTER
----- Message from Maribel sent at 1/28/2025  3:12 PM CST -----  Contact: DEBORAH MARTINEZ [[id number]]  .Type:  Patient Requesting Call    Who Called:DEBORAH MARTINEZ [[id number]]  Does the patient know what this is regarding?:Patient requesting a call back stating she was seen this morning and is waiting on medications to be called in  Would the patient rather a call back or a response via [facility]? Call back  Best Call Back Number:.[phone] ([place])    Additional Information:

## 2025-01-28 NOTE — TELEPHONE ENCOUNTER
Patient call has been return regarding her medication pick - up. Patient was notified that soon as the doctor finish clinic he will sign off and send her medication to the pharmacy. Patient verbalized understanding

## 2025-01-28 NOTE — PROGRESS NOTES
Outpatient Rehab    Physical Therapy Evaluation    Patient Name: Shirley Amaya  MRN: [id number]  YOB: 1960  Today's Date: 1/28/2025    Therapy Diagnosis:   Encounter Diagnoses   Name Primary?    Primary osteoarthritis of left knee     Primary osteoarthritis of right knee     Bilateral primary osteoarthritis of knee Yes     Physician: Aniceto Good MD    Physician Orders: Eval and Treat  Medical Diagnosis: Bilateral primary osteoarthritis.    Visit # / Visits Authorized:  1 / 1   Date of Evaluation:  1/27/2025   Insurance Authorization Period: 12/30/2024 to 12/30/2025  Plan of Care Certification:  1/27/2025 to 03/21/2025      Time In: 1600   Time Out: 1700  Total Time: 60   Total Billable Time: 60         Subjective   History of Present Illness  Shirley is a 64 y.o. female who reports to physical therapy with a chief concern of B knee pain. According to the patient's chart, Shirley has a past medical history of Abnormal glucose, Hyperlipidemia, OA (osteoarthritis) of knee, and YUDITH on CPAP. Shirley has a past surgical history that includes Cervical conization w/ laser; Colonoscopy (N/A, 11/30/2016); and Colonoscopy (N/A, 02/28/2022).    The patient reports a medical diagnosis of primary osteoarthritis of bilateral knees.    Diagnostic tests related to this condition: X-ray.   X-Ray Details: osteoarthritis bilateral knees.    Dominant Hand: Right  History of Present Condition/Illness: Patient having knee issues since 2019 and will have Bilateral total knee replacements with Dr Good to be done 2/3/2025.    Pain     Patient reports a current pain level of 0/10. Pain at best is reported as 0/10. Pain at worst is reported as 9/10.   Location: Bilateral knee anterior left greater than right side.  Clinical Progression (since onset): Worsening  Pain Qualities: Aching, Knife-like, Sharp  Pain-Relieving Factors: Massage, Movement, Change in position, Medications - prescription  Pain-Aggravating Factors:  Kneeling, Stair climbing, Twisting, Walking, Standing, Squatting         Living Arrangements  Living Situation  Housing: Home independently  Living Arrangements: Family members  Support Systems: Spouse/significant other    Home Setup  Type of Structure: Mobile home  Home Access: Ramped entrance  Number of Levels in Home: One level  Bathroom Equipment: Other (Comment)  Other Bathroom Equipment Comment: walk in shower and uses BSC for seat.    Equipment/Treatments  Mobility Equipment: Manual wheelchair, Standard walker, Wheeled walker        Employment  Employment Status: Employed full-time   She works for a [employer] and can work some from home.        Past Medical History/Physical Systems Review:   Shirley Amaya  has a past medical history of Abnormal glucose, Hyperlipidemia, OA (osteoarthritis) of knee, and YUDITH on CPAP.    Shirley Amyaa  has a past surgical history that includes Cervical conization w/ laser; Colonoscopy (N/A, 11/30/2016); and Colonoscopy (N/A, 02/28/2022).    Shirley has a current medication list which includes the following prescription(s): atorvastatin, clobetasol 0.05%, meclizine, meloxicam, and metformin.    Review of patient's allergies indicates:  No Known Allergies     Objective   Posture                 Bilaterally, knee position is: Flexed and Genu Varum  Left knee flexed greater than right knee in standing.  Genu varus Bilateral and about equal Right and Left in standing.      Knee Range of Motion   Right Knee   Active (deg) Passive (deg) Pain   Flexion 125       Extension 10           Left Knee   Active (deg) Passive (deg) Pain   Flexion 115       Extension 15           Girth of knee at midline:  left and right 35.5 cm.             Balance Test       Single Leg Stand - Right Foot: 5 sec  Single Leg Stand - Left Foot: 1 sec       Sit to Stand Testing      The patient completed 19 repetitions of a sit to stand transfer in 30 seconds. uses UE for test to rise.           Gait Analysis  Base of Support: Wide  Gait Pattern: Antalgic  Walking Speed: Decreased    Right Side Walking Observations  Decreased: Stance Time, Swing Time, and Step Length       Left Side Walking Observations  Decreased: Stance Time, Swing Time, and Step Length            Intake Outcome Measure for FOTO Survey    Therapist reviewed FOTO scores for Shirley Amaya on 1/27/2025.   FOTO report - see Media section or FOTO account episode details.     Intake Score: 31%    Treatment:  Therapeutic Exercise  Therapeutic Exercise Activity 1: supine quad sets L and R  Therapeutic Exercise Activity 2: Seated HS stretch R and L  Therapeutic Exercise Activity 3: Supine SLR  Therapeutic Exercise Activity 4: Supine heel slide  Therapeutic Exercise Activity 5: Prone knee extension stretch (Hang)    Therapeutic Activity  Therapeutic Activity 1: Education on gait with RW or standard walker  Therapeutic Activity 2: Education on HEP pre and post op  Therapeutic Activity 3: Education on elevaton and ice for B knees.    Patient's spiritual, cultural, and educational needs considered and patient agreeable to plan of care and goals.     Assessment & Plan   Assessment  Shirley presents with a condition of Moderate complexity.   Presentation of Symptoms: Evolving  Will Comorbidities Impact Care: Yes  Chronic knee pain    Functional Limitations: Activity tolerance, Ambulating on uneven surfaces, Functional mobility, Gait limitations, Pain with ADLs/IADLs, Painful locomotion/ambulation, Squatting, Standing tolerance, Completing work/school activities, Decreased ambulation distance/endurance  Impairments: Abnormal gait, Abnormal or restricted range of motion, Impaired balance, Impaired physical strength, Lack of appropriate home exercise program, Pain with functional activity, Weight-bearing intolerance    Prognosis: Excellent  Prognosis Details: Patient to have Bilateral knee replacement 2/3/2025.    Plan  From a physical therapy  perspective, the patient would benefit from: Skilled Rehab Services    Planned therapy interventions include: Therapeutic exercise, Therapeutic activities, Neuromuscular re-education, Manual therapy, and ADLs/IADLs.    Planned modalities to include: Cryotherapy (cold pack), Electrical stimulation - passive/unattended, and Electrical stimulation - attended.        Visit Frequency: 2 times Per Week for 6 Weeks.  Other/tapered frequency details: Patient to start 2 x per week after surgery (2/3/2025) on 2/7/2025 (Friday)    This plan was discussed with Patient and Caregiver.              Goals:   Active       Physical Therapy  prehab education and HEP       Assess ROM, strength, function, gait and swelling post-op and set new goals base on post-op measures.       Start:  01/28/25    Expected End:  02/07/25

## 2025-01-29 NOTE — PROGRESS NOTES
Name: Shirley Amaya  MRN: [id number]  Date: 1/28/2025  Time: 10:13 PM      CHIEF COMPLAINT:  Bilateral severe knee pain.    SUBJECTIVE:  The patient is 64 year of age with a end-stage osteoarthritis Kellgren Freddy grade 4 of the bilateral knees.  She has failed all reasonable conservative treatment efforts over the last 4 years for arthritis treatment.  She has gone through preoperative clearance for knee replacement surgery of both knees.  She was counseled on the risks and benefits of bilateral knee replacement as well as the options for end-stage knee surgery.  She has elected to proceed with bilateral knee replacement.      Review of patient's allergies indicates:  No Known Allergies       MEDICATIONS    Current Outpatient Medications:     atorvastatin (LIPITOR) 20 MG tablet, Take 1 tablet (20 mg total) by mouth once daily., Disp: 90 tablet, Rfl: 3    clobetasol 0.05% (TEMOVATE) 0.05 % Oint, Apply topically nightly as needed (itching). Do not use for more than 2 weeks continously, Disp: 30 g, Rfl: 3    meclizine (ANTIVERT) 25 mg tablet, Take 1 tablet (25 mg total) by mouth 3 (three) times daily as needed for Dizziness., Disp: 30 tablet, Rfl: 1    meloxicam (MOBIC) 15 MG tablet, Take 15 mg by mouth once daily., Disp: , Rfl:     metFORMIN (GLUCOPHAGE) 500 MG tablet, Take 1 tablet (500 mg total) by mouth 2 (two) times daily with meals., Disp: 180 tablet, Rfl: 3    apixaban (ELIQUIS) 2.5 mg Tab, Take 1 tablet (2.5 mg total) by mouth 2 (two) times daily., Disp: 24 tablet, Rfl: 0    levocetirizine (XYZAL) 5 MG tablet, Take 1 tablet (5 mg total) by mouth every evening., Disp: 30 tablet, Rfl: 0    methocarbamoL (ROBAXIN) 500 MG Tab, Take 1 tablet (500 mg total) by mouth every 6 (six) hours as needed (1 Tab every 6 hours PRN)., Disp: 20 tablet, Rfl: 1    ondansetron (ZOFRAN-ODT) 4 MG TbDL, Take 1 tablet (4 mg total) by mouth every 6 (six) hours as needed (Place 1 Tab under tounge every 6 hours PRN)., Disp: 10  tablet, Rfl: 0    oxyCODONE (ROXICODONE) 5 MG immediate release tablet, Take 1 tablet (5 mg total) by mouth every 6 (six) hours as needed for Pain., Disp: 21 tablet, Rfl: 0    senna-docusate 8.6-50 mg (SENNA WITH DOCUSATE SODIUM) 8.6-50 mg per tablet, Take 2 tablets by mouth once daily., Disp: 40 tablet, Rfl: 1  There are no discontinued medications.      REVIEW OF SYSTEMS:  No recent viral illnesses.  No fevers chills sweats chest pains or shortness of breath.    MEDICAL HX:   Past Medical History:   Diagnosis Date    Abnormal glucose     Hyperlipidemia     OA (osteoarthritis) of knee     YUDITH on CPAP        SURGICAL HISTORY:   Past Surgical History:   Procedure Laterality Date    CERVICAL CONIZATION   W/ LASER      COLONOSCOPY N/A 2016    Procedure: COLONOSCOPY;  Surgeon: Mary Victor MD;  Location: [facility] ENDO;  Service: Endoscopy;  Laterality: N/A;    COLONOSCOPY N/A 2022    Procedure: COLONOSCOPY;  Surgeon: Andrae Barger MD;  Location: [facility] ENDO;  Service: Endoscopy;  Laterality: N/A;       SOCIAL HX:   Social History     Occupational History    Occupation: [employer]   Tobacco Use    Smoking status: Former     Current packs/day: 0.00     Average packs/day: 2.0 packs/day for 25.0 years (50.0 ttl pk-yrs)     Types: Cigarettes     Start date: 1975     Quit date: 2000     Years since quittin.7    Smokeless tobacco: Never   Substance and Sexual Activity    Alcohol use: No    Drug use: No    Sexual activity: Yes     Partners: Male     Birth control/protection: Post-menopausal       PHYSICAL EXAMINATION:  Body mass index is 28.91 kg/m²., height 5 ft 4 in, weight 76.4 kg    General:  Pleasant alert cooperative well dressed  female    Extremity:  Bilateral knee with varus deformity.  Positive crepitation with range of motion bilateral knees.  Positive fusion bilateral knees positive antalgic gait bilateral knees.  5/5 hamstring calf quad strength bilateral knees  No  peripheral edema view of the lower extremity.  Normal pulses and capillary refill bilateral lower extremities.    XRAY:  Kellgren Freddy grade 4 arthritis right knee  Kellgren Freddy grade 4 arthritis left knee      LABS REVIEWED  Lab Visit on 01/28/2025   Component Date Value Ref Range Status    Group & Rh 01/28/2025 A POS   Final    Indirect Marcie 01/28/2025 NEG   Final    Specimen Outdate 01/28/2025 01/31/2025 23:59   Final   Hospital Outpatient Visit on 01/02/2025   Component Date Value Ref Range Status    QRS Duration 01/02/2025 102  ms Final    [facility]HS QTC Calculation 01/02/2025 434  ms Final          Patient Instructions     Encounter Diagnoses   Name Primary?    Primary osteoarthritis of right knee Yes    Primary osteoarthritis of left knee        ASSESSMENT:  Cleared for surgery.  Procedure bilateral total knee replacement February 3rd.  Overnight stay at [facility].    PLAN:  The patient prefers spinal anesthesia.  Tylenol, tramadol and oxycodone for pain.  Methocarbamol for muscle relaxer.  Zofran for nausea.  Eliquis for DVT prophylaxis.  Senna/docusate for constipation  Postop physical therapy [facility]   will be assisting at home postoperatively.  Reviewed risks and benefits of surgery informed consent obtained today.  All of the patient's questions were answered today.  Patient will get her preoperative blood work today and attend the preoperative joint boot camp information this week.    FOLLOW UP:  Postoperatively week 1     Orders Placed This Encounter    Ambulatory Referral/Consult to Physical Therapy/Occupational Therapy    ondansetron (ZOFRAN-ODT) 4 MG TbDL    oxyCODONE (ROXICODONE) 5 MG immediate release tablet    senna-docusate 8.6-50 mg (SENNA WITH DOCUSATE SODIUM) 8.6-50 mg per tablet    apixaban (ELIQUIS) 2.5 mg Tab    methocarbamoL (ROBAXIN) 500 MG Tab    levocetirizine (XYZAL) 5 MG tablet        No follow-ups on file.       Forty-five min Patient  encounter:  This includes face to face time and non-face to face time preparing to see the patient (eg, review of tests),   obtaining and/or reviewing separately obtained history, documenting clinical information in the electronic or other health record, independently interpreting results   and communicating results to the patient/family/caregiver, or care coordinator.         Aniceto Good M.D.  Orthopedic Surgeon  [facility]

## 2025-01-29 NOTE — PATIENT INSTRUCTIONS
Encounter Diagnoses   Name Primary?    Primary osteoarthritis of right knee Yes    Primary osteoarthritis of left knee        ASSESSMENT:  Surgical clearance pending blood work today.  Procedure bilateral total knee replacement February 3rd.  Overnight stay at [facility].    PLAN:  The patient prefers spinal anesthesia.  Tylenol, tramadol and oxycodone for pain.  Methocarbamol for muscle relaxer.  Zofran for nausea.  Eliquis for DVT prophylaxis.  Senna/docusate for constipation  Postop physical therapy [facility]   will be assisting at home postoperatively.  Reviewed risks and benefits of surgery informed consent obtained today.  All of the patient's questions were answered today.  Patient will get her preoperative blood work today and attend the preoperative joint boot camp information this week.    FOLLOW UP:  Postoperatively week 1

## 2025-01-30 ENCOUNTER — PATIENT MESSAGE (OUTPATIENT)
Dept: ORTHOPEDICS | Facility: CLINIC | Age: 65
End: 2025-01-30
Payer: COMMERCIAL

## 2025-01-30 ENCOUNTER — OFFICE VISIT (OUTPATIENT)
Dept: INTERNAL MEDICINE | Facility: CLINIC | Age: 65
End: 2025-01-30
Payer: COMMERCIAL

## 2025-01-30 ENCOUNTER — APPOINTMENT (OUTPATIENT)
Facility: HOSPITAL | Age: 65
End: 2025-01-30
Attending: ORTHOPAEDIC SURGERY
Payer: COMMERCIAL

## 2025-01-30 ENCOUNTER — PATIENT MESSAGE (OUTPATIENT)
Dept: PREADMISSION TESTING | Facility: HOSPITAL | Age: 65
End: 2025-01-30
Payer: COMMERCIAL

## 2025-01-30 ENCOUNTER — OFFICE VISIT (OUTPATIENT)
Dept: CARDIOLOGY | Facility: CLINIC | Age: 65
End: 2025-01-30
Payer: COMMERCIAL

## 2025-01-30 VITALS
HEART RATE: 68 BPM | WEIGHT: 166.44 LBS | OXYGEN SATURATION: 98 % | DIASTOLIC BLOOD PRESSURE: 79 MMHG | BODY MASS INDEX: 28.57 KG/M2 | SYSTOLIC BLOOD PRESSURE: 123 MMHG

## 2025-01-30 VITALS
RESPIRATION RATE: 16 BRPM | HEART RATE: 70 BPM | SYSTOLIC BLOOD PRESSURE: 147 MMHG | OXYGEN SATURATION: 96 % | TEMPERATURE: 98 F | DIASTOLIC BLOOD PRESSURE: 70 MMHG

## 2025-01-30 DIAGNOSIS — M17.11 PRIMARY OSTEOARTHRITIS OF RIGHT KNEE: Primary | ICD-10-CM

## 2025-01-30 DIAGNOSIS — M17.0 BILATERAL PRIMARY OSTEOARTHRITIS OF KNEE: Primary | ICD-10-CM

## 2025-01-30 DIAGNOSIS — Z01.810 PREOP CARDIOVASCULAR EXAM: ICD-10-CM

## 2025-01-30 DIAGNOSIS — M17.12 PRIMARY OSTEOARTHRITIS OF LEFT KNEE: ICD-10-CM

## 2025-01-30 DIAGNOSIS — M17.11 PRIMARY OSTEOARTHRITIS OF RIGHT KNEE: ICD-10-CM

## 2025-01-30 DIAGNOSIS — G47.33 OSA ON CPAP: ICD-10-CM

## 2025-01-30 DIAGNOSIS — R73.03 PREDIABETES: ICD-10-CM

## 2025-01-30 DIAGNOSIS — E78.5 HYPERLIPIDEMIA, UNSPECIFIED HYPERLIPIDEMIA TYPE: ICD-10-CM

## 2025-01-30 DIAGNOSIS — Z01.818 PRE-OP EXAM: ICD-10-CM

## 2025-01-30 DIAGNOSIS — R03.0 ELEVATED BLOOD PRESSURE READING: Primary | ICD-10-CM

## 2025-01-30 PROCEDURE — 99999 PR PBB SHADOW E&M-EST. PATIENT-LVL IV: CPT | Mod: PBBFAC,,,

## 2025-01-30 PROCEDURE — 3078F DIAST BP <80 MM HG: CPT | Mod: CPTII,S$GLB,, | Performed by: INTERNAL MEDICINE

## 2025-01-30 PROCEDURE — 99214 OFFICE O/P EST MOD 30 MIN: CPT | Mod: S$GLB,,, | Performed by: INTERNAL MEDICINE

## 2025-01-30 PROCEDURE — 3074F SYST BP LT 130 MM HG: CPT | Mod: CPTII,S$GLB,, | Performed by: INTERNAL MEDICINE

## 2025-01-30 PROCEDURE — 1159F MED LIST DOCD IN RCRD: CPT | Mod: CPTII,S$GLB,, | Performed by: INTERNAL MEDICINE

## 2025-01-30 PROCEDURE — 99999 PR PBB SHADOW E&M-EST. PATIENT-LVL III: CPT | Mod: PBBFAC,,, | Performed by: INTERNAL MEDICINE

## 2025-01-30 PROCEDURE — 3008F BODY MASS INDEX DOCD: CPT | Mod: CPTII,S$GLB,, | Performed by: INTERNAL MEDICINE

## 2025-01-30 RX ORDER — METOPROLOL SUCCINATE 25 MG/1
12.5 TABLET, EXTENDED RELEASE ORAL DAILY
Qty: 30 TABLET | Refills: 3 | Status: ON HOLD | OUTPATIENT
Start: 2025-01-30 | End: 2026-01-30

## 2025-01-30 RX ORDER — METFORMIN HYDROCHLORIDE 500 MG/1
500 TABLET ORAL 2 TIMES DAILY WITH MEALS
Status: CANCELLED | OUTPATIENT
Start: 2025-01-30

## 2025-01-30 RX ORDER — ONDANSETRON 4 MG/1
4 TABLET, ORALLY DISINTEGRATING ORAL EVERY 6 HOURS PRN
Status: CANCELLED | OUTPATIENT
Start: 2025-01-30

## 2025-01-30 RX ORDER — ACETAMINOPHEN 500 MG
1000 TABLET ORAL 3 TIMES DAILY
Status: CANCELLED | OUTPATIENT
Start: 2025-01-30 | End: 2025-01-31

## 2025-01-30 RX ORDER — TRAMADOL HYDROCHLORIDE 50 MG/1
50 TABLET ORAL EVERY 4 HOURS PRN
Qty: 56 TABLET | Refills: 0 | Status: ON HOLD | OUTPATIENT
Start: 2025-01-30

## 2025-01-30 RX ORDER — MECLIZINE HYDROCHLORIDE 25 MG/1
25 TABLET ORAL 3 TIMES DAILY PRN
Status: CANCELLED | OUTPATIENT
Start: 2025-01-30

## 2025-01-30 RX ORDER — DIPHENHYDRAMINE HCL 25 MG
25 CAPSULE ORAL EVERY 6 HOURS PRN
Status: CANCELLED | OUTPATIENT
Start: 2025-01-30

## 2025-01-30 RX ORDER — ATORVASTATIN CALCIUM 20 MG/1
20 TABLET, FILM COATED ORAL DAILY
Status: CANCELLED | OUTPATIENT
Start: 2025-01-30

## 2025-01-30 RX ORDER — OXYCODONE HYDROCHLORIDE 5 MG/1
5 TABLET ORAL EVERY 4 HOURS PRN
Status: CANCELLED | OUTPATIENT
Start: 2025-01-30

## 2025-01-30 RX ORDER — ONDANSETRON HYDROCHLORIDE 2 MG/ML
4 INJECTION, SOLUTION INTRAVENOUS EVERY 6 HOURS PRN
Status: CANCELLED | OUTPATIENT
Start: 2025-01-30

## 2025-01-30 RX ORDER — TRAMADOL HYDROCHLORIDE 50 MG/1
100 TABLET ORAL EVERY 4 HOURS PRN
Status: CANCELLED | OUTPATIENT
Start: 2025-01-30

## 2025-01-30 RX ORDER — AMOXICILLIN 250 MG
2 CAPSULE ORAL DAILY
Status: CANCELLED | OUTPATIENT
Start: 2025-01-30

## 2025-01-30 RX ORDER — CYCLOBENZAPRINE HCL 5 MG
10 TABLET ORAL 3 TIMES DAILY PRN
Status: CANCELLED | OUTPATIENT
Start: 2025-01-30

## 2025-01-30 NOTE — DISCHARGE INSTRUCTIONS
To confirm, your doctor has instructed you: Surgery is scheduled for 2/3/25.   Pre admit office will call the afternoon prior to surgery between 1PM and 3PM with arrival time.    Surgery will be at [facility],  The address is [address]. [place] [address].      IMPORTANT INSTRUCTIONS!    Do not eat or drink after 12 midnight, including water. Do not smoke or use chewing tobacco after 12 midnight!  OK to brush teeth, but no gum, candy, or mints!      *Take only these medicines with a small swallow of water-morning of surgery*     No meds        ____ Stop taking all vitamins, herbal supplements, Aspirin, & NSAIDS (Ibuprofen, Advil, Aleve) 7 days prior to surgery, as these can thin your blood.    ____ Weight loss medication, such as Adipex and Phentermine, must be stopped 14 days prior to surgery, no exceptions!    *Diabetic Patients: If you take diabetic or weight loss medication, do NOT take morning of surgery unless instructed by Doctor. Metformin to be stopped 24 hrs prior to surgery. DO NOT take short-acting insulin the day of surgery. Only take HALF of your regular dose of long-acting insulin the night before surgery, unless instructed otherwise. Blood sugars will be checked in pre-op.   ~Ozempic/Mounjaro/Wegovy/Trulicity/Semaglutide injections must be stopped 7 days prior to surgery.     Please notify MD office if you develop an active infection, are prescribed antibiotics by someone other than the surgeon doing your surgery, or visit urgent care/ER.    Bathing Instructions:   The night before surgery and the morning prior to coming to the hospital:    - Shower & rinse your body as usual with anti-bacterial Soap (Dial or Lever 2000)   -Hibiclens (if indicated) use AFTER anti-bacterial soap; 1 packet PM/1 packet in AM on surgical site only   -Do not use hibiclens on your head, face, or genitals.    -Do not wash with anti-bacterial soap after you use the hibiclens.    -Do not shave surgical  site 5-7 days prior to surgery.    -Pubic hair 7 days prior to surgery (OBGYN/Urology only).       Additional Instructions:   __ No makeup, powder, lotions, creams, or body spray to skin   __ No deodorant if you are having: breast procedure, PORT insertion, or shoulder surgery!   __ No nail polish or artificial nails due to risk of infection.             **SURGERY MAY BE CANCELLED AT SURGEON'S DISCRETION IF ARTIFICIAL/NAIL POLISH IS PRESENT!!!**  __ Please remove all piercings and leave all jewelry at home.    **SURGERY WILL BE CANCELLED IF PIERCINGS ARE PRESENT!!!**    __ Dentures, Hearing Aids and Contact Lens need to be removed prior to the start of surgery.    __ Avoid Alcoholic beverages 3 days prior to surgery, as it can thin the blood, unless told otherwise by pre-admit department.  __ Females: may need to give a urine sample the morning of surgery;   **Please ask  for a specimen cup if you need to use the restroom prior to being called into pre-op.**    __ You must have transportation, and they MUST stay the entire time.   __  Bring photo ID and insurance information to hospital    What to Wear:  Clean, loose-fitting clothing. Please allow for dressings/bandages/surgical equipment/drains.     ~Knee Patients: We recommend oversized pants/shorts or a dress to accommodate any knee braces in place. Braces will not be removed to get dressed.    [facility] Visitor/Ride Policy:    Only 1 adult allowed (18 or older) to accompany you and MUST STAY through the entire admission length.      -Must have a ride home from a responsible adult that you know and trust.     -Medical Transport, Uber or Lyft can only be used if patient has a responsible adult to   accompany them during ride home.      ~Your ride MUST STAY the entire time until you are discharged~   Please notify the pre-admit department prior to surgery if you use medication transportation so we can verify your arrival/pickup time!   -The patient is  responsible for setting up their own transportation!    Discharge Prescriptions:  Your discharge prescriptions will be sent next door to [facility], unless otherwise discussed with your surgeon. Your  will be responsible for calling the pharmacy ([phone]) to begin the prescription filling process. They will receive a text message with instructions once you are in recovery. Please make sure we have your insurance information and you bring a payment method (cash or card) if needed for prescriptions. If you have  insurance, please let the pre-op nurse know, as the pharmacy does not take this insurance.     *If you are running late or have questions the morning of surgery, please call the Pre-OP Department @ [phone].       *Please Call [facility] Pre-Admit Department for surgery instruction questions:   [phone]     Payment Questions:                Billing Question Numbers:         [phone]

## 2025-01-30 NOTE — PROGRESS NOTES
Preoperative History and Physical                                                                                                  Chief Complaint: Preoperative evaluation     History of Present Illness:      Shirley Amaya is a 64 y.o. female who presents to the office today for a preoperative consultation at the request of Dr. Good  who plans on performing B TKA on February 3.     Functional Status:      The patient is able to climb a flight of stairs. The patient is able to ambulate  without difficulty. The patient's functional status is not affected by the surgical problem. The patient's functional status is not affected by shortness of breath, chest pain, dyspnea on exertion and fatigue.  Pt was riding  stationary bike stopped 1 week ago - rode once every few days - 10-20 min only 2 weeks. Pt can weed eat , no CP no SOB with activity.   MET score greater than 4    Patient Anesthesia History:      History of Malignant Hyperthermia: no  History of Pseudocholinesterase Deficiency: no  History PONV: no  History of difficult intubation: no  History of delayed emergence: no    Family Anesthesia History:      History of Malignant Hyperthermia: no  History of Pseudocholinesterase Deficiency: no     Past Medical History:      Past Medical History:   Diagnosis Date    Abnormal glucose     Cancer     cervical    Diabetes mellitus, type 2     Hyperlipidemia     OA (osteoarthritis) of knee     YUDITH on CPAP         Past Surgical History:      Past Surgical History:   Procedure Laterality Date    CERVICAL CONIZATION   W/ LASER      COLONOSCOPY N/A 11/30/2016    Procedure: COLONOSCOPY;  Surgeon: Mary Victor MD;  Location: [facility];  Service: Endoscopy;  Laterality: N/A;    COLONOSCOPY N/A 02/28/2022    Procedure: COLONOSCOPY;  Surgeon: Andrae Barger MD;  Location: [facility];  Service: Endoscopy;  Laterality: N/A;        Social History:      Social History     Socioeconomic History    Marital status:      Number of children: 2   Occupational History    Occupation: [employer]   Tobacco Use    Smoking status: Former     Current packs/day: 0.00     Average packs/day: 2.0 packs/day for 25.0 years (50.0 ttl pk-yrs)     Types: Cigarettes     Start date: 1975     Quit date: 2000     Years since quittin.7    Smokeless tobacco: Never    Tobacco comments:     Up 2 ppd x 25 years    Substance and Sexual Activity    Alcohol use: No    Drug use: No    Sexual activity: Yes     Partners: Male     Birth control/protection: Post-menopausal     Social Drivers of Health     Financial Resource Strain: Patient Declined (2024)    Overall Financial Resource Strain (CARDIA)     Difficulty of Paying Living Expenses: Patient declined   Food Insecurity: Patient Declined (2024)    Hunger Vital Sign     Worried About Running Out of Food in the Last Year: Patient declined     Ran Out of Food in the Last Year: Patient declined   Transportation Needs: Patient Declined (2024)    PRAPARE - Transportation     Lack of Transportation (Medical): Patient declined     Lack of Transportation (Non-Medical): Patient declined   Physical Activity: Insufficiently Active (2024)    Exercise Vital Sign     Days of Exercise per Week: 3 days     Minutes of Exercise per Session: 20 min   Stress: No Stress Concern Present (2024)    [employer] - Occupational Stress Questionnaire     Feeling of Stress : Not at all   Housing Stability: Patient Declined (2024)    Housing Stability Vital Sign     Unable to Pay for Housing in the Last Year: Patient declined     Unstable Housing in the Last Year: Patient declined        Family History:      Family History   Problem Relation Name Age of Onset    Dementia Mother      Diabetes Father      Coronary artery disease Father      Lung cancer Sister      Stomach cancer Brother      Heart disease Neg Hx      Hypertension Neg Hx      Colon cancer Neg Hx       Breast cancer Neg Hx      Ovarian cancer Neg Hx         Allergies:      Review of patient's allergies indicates:  Not on File    Medications:      Current Outpatient Medications   Medication Sig    atorvastatin (LIPITOR) 20 MG tablet Take 1 tablet (20 mg total) by mouth once daily.    calcium carbonate/vitamin D3 (CALCIUM PETITES ORAL) Take by mouth.    clobetasol 0.05% (TEMOVATE) 0.05 % Oint Apply topically nightly as needed (itching). Do not use for more than 2 weeks continously    cyanocobalamin/thiamine HCl (PONCHO-B-12 ORAL) Take by mouth.    iron bis-gly/FA/C/B12/Ca/succ (IRON-150 ORAL) Take by mouth.    levocetirizine (XYZAL) 5 MG tablet Take 1 tablet (5 mg total) by mouth every evening.    meclizine (ANTIVERT) 25 mg tablet Take 1 tablet (25 mg total) by mouth 3 (three) times daily as needed for Dizziness.    meloxicam (MOBIC) 15 MG tablet Take 15 mg by mouth once daily.    metFORMIN (GLUCOPHAGE) 500 MG tablet Take 1 tablet (500 mg total) by mouth 2 (two) times daily with meals.    apixaban (ELIQUIS) 2.5 mg Tab Take 1 tablet (2.5 mg total) by mouth 2 (two) times daily. (Patient not taking: Reported on 1/30/2025)    methocarbamoL (ROBAXIN) 500 MG Tab Take 1 tablet (500 mg total) by mouth every 6 (six) hours as needed (1 Tab every 6 hours PRN). (Patient not taking: Reported on 1/30/2025)    ondansetron (ZOFRAN-ODT) 4 MG TbDL Take 1 tablet (4 mg total) by mouth every 6 (six) hours as needed (Place 1 Tab under tounge every 6 hours PRN). (Patient not taking: Reported on 1/30/2025)    oxyCODONE (ROXICODONE) 5 MG immediate release tablet Take 1 tablet (5 mg total) by mouth every 6 (six) hours as needed for Pain. (Patient not taking: Reported on 1/30/2025)    senna-docusate 8.6-50 mg (SENNA WITH DOCUSATE SODIUM) 8.6-50 mg per tablet Take 2 tablets by mouth once daily. (Patient not taking: Reported on 1/30/2025)     No current facility-administered medications for this visit.       Vitals:      Vitals:    01/30/25  1017   BP: (!) 147/70   Pulse: 70   Resp: 16   Temp: 97.6 °F (36.4 °C)       Review of Systems:        Review of Systems   Constitutional:  Negative for chills, fever and weight loss.   HENT:  Positive for tinnitus. Negative for congestion, ear discharge, ear pain and hearing loss.    Eyes:  Negative for pain, discharge and redness.   Respiratory:  Negative for cough, shortness of breath and wheezing.    Cardiovascular:  Positive for leg swelling (KNEES). Negative for chest pain and palpitations.   Gastrointestinal:  Negative for abdominal pain, blood in stool, heartburn, melena, nausea and vomiting.   Genitourinary:  Negative for dysuria, frequency, hematuria and urgency.   Musculoskeletal:  Positive for joint pain.   Skin:  Negative for itching and rash.   Neurological:  Negative for dizziness, tremors, seizures, loss of consciousness, weakness and headaches.   Endo/Heme/Allergies:  Negative for environmental allergies.   Psychiatric/Behavioral:  Negative for depression.          Physical Exam:      Constitutional: Appears well-developed, well-nourished and in no acute distress.  Patient is oriented to person, place, and time.   Head: Normocephalic and atraumatic. Mucous membranes moist.  Neck: Neck supple   Cardiovascular: Normal rate and regular rhythm.  S1 S2 appreciated by ascultation.  Pulmonary/Chest: Effort normal and clear to auscultation bilaterally. No respiratory distress.   Abdomen:  non-distended.   Neurological: Patient is alert and oriented to person, place and time. Moves all extremities.  Skin: Warm and dry. No lesions.  Extremities: No clubbing, cyanosis or edema.    Laboratory data:          Lab Results   Component Value Date    INR 0.9 12/30/2024       Lab Results   Component Value Date    WBC 6.54 12/30/2024    HGB 12.0 12/30/2024    HCT 36.6 (L) 12/30/2024    MCV 93 12/30/2024     12/30/2024       @[id number](GLU,NA,K,Cl,CO2,BUN,Creatinine,Calcium,MG)@    Predictors of intubation  difficulty:       Morbid obesity? no   Anatomically abnormal facies? no   Prominent incisors? no   Receding mandible? no   Short, thick neck? no   Neck range of motion: normal   Dentition:  UPPER full plate, lower partial   Based on the Modified Mallampati, patient is a mallampati score: II (hard and soft palate, upper portion of tonsils anduvula visible)    Cardiographics:      ECG:   Results for orders placed or performed during the hospital encounter of 01/02/25   EKG 12-lead    Collection Time: 01/02/25  1:43 PM   Result Value Ref Range    QRS Duration 102 ms    OHS QTC Calculation 434 ms    Narrative    Test Reason : M17.12,M17.11,    Vent. Rate :  83 BPM     Atrial Rate :  83 BPM     P-R Int : 180 ms          QRS Dur : 102 ms      QT Int : 370 ms       P-R-T Axes :  38  37  75 degrees    QTcB Int : 434 ms    Normal sinus rhythm  Cannot rule out Anterior infarct ,age undetermined  Abnormal ECG  When compared with ECG of 28-Apr-2023 13:32,  No significant change was found  Confirmed by Agustin Mays ([id number]) on 1/2/2025 5:53:49 PM    Referred By: SELENA TO           Confirmed By: Agustin Mays       Echocardiogram:  none    Imaging:      Chest x-ray:  none      Assessment and Plan:      Bilateral primary osteoarthritis of knee  Pt presents at tthe request of Dr. Garcia who plans on performing a B TKA on 2/3/25   Known risk factors for perioperative complications:  pre DM     Difficulty with intubation is not anticipated.    Cardiac Risk Estimation:  Per Revised Cardiac Risk Index patient 's RCRI score is 0 with a 3.9% risk of a major cardiac event.      1.) Preoperative workup as follows: ECG, hemoglobin, hematocrit, electrolytes, creatinine, glucose, liver function studies.  2.) Change in medication regimen before surgery:  hold NSAIDs 7 days preop .  3.) Prophylaxis for cardiac events with perioperative beta-blockers: not indicated.  4.) Invasive hemodynamic monitoring perioperatively: not indicated.  5.) Deep  vein thrombosis prophylaxis postoperatively: intermittent pneumatic compression boots and regimen to be chosen by surgical team.  6.) Surveillance for postoperative MI with ECG immediately postoperatively and on postoperati ve days 1 and 2 AND troponin levels 24 hours postoperatively and on day 4 or hospital discharge (whichever comes first): not indicated.  7.) Current medications which may produce withdrawal symptoms if withheld perioperatively: none  8.) Other measures:  Pt previously saw Dr. Massey who recommended stress test. Pt was c/o palpitation and chest discomfort at the time. Pt is asymptomatic currently, her EKG is showing cannot r/o anterior infarct risk factors include 25 year smoking hx up to 2 ppd at one point. Elevated BP in clinic with no dx of /70 with repeat 157/77. Out of caution, prior recs from cards for stress, EKG cannot r/o anterior infarct will have pt see Dr. Garcia today to further assess need for further testing/ cardiac recs.         Hyperlipidemia  Continue home statin     Prediabetes  Continue home metformin , A1c pending   Keep appropriate follow up with primary care    YUDITH on CPAP  Continue home CPAP          Electronically signed   DAVID Brody PA-C

## 2025-01-30 NOTE — ASSESSMENT & PLAN NOTE
Pt presents at tthe request of Dr. Garcia who plans on performing a B TKA on 2/3/25   Known risk factors for perioperative complications:  pre DM     Difficulty with intubation is not anticipated.    Cardiac Risk Estimation:  Per Revised Cardiac Risk Index patient 's RCRI score is 0 with a 3.9% risk of a major cardiac event.      1.) Preoperative workup as follows: ECG, hemoglobin, hematocrit, electrolytes, creatinine, glucose, liver function studies.  2.) Change in medication regimen before surgery:  hold NSAIDs 7 days preop .  3.) Prophylaxis for cardiac events with perioperative beta-blockers: not indicated.  4.) Invasive hemodynamic monitoring perioperatively: not indicated.  5.) Deep vein thrombosis prophylaxis postoperatively: intermittent pneumatic compression boots and regimen to be chosen by surgical team.  6.) Surveillance for postoperative MI with ECG immediately postoperatively and on postoperati ve days 1 and 2 AND troponin levels 24 hours postoperatively and on day 4 or hospital discharge (whichever comes first): not indicated.  7.) Current medications which may produce withdrawal symptoms if withheld perioperatively: none  8.) Other measures:  Pt previously saw Dr. Massey who recommended stress test. Pt was c/o palpitation and chest discomfort at the time. Pt is asymptomatic currently, her EKG is showing cannot r/o anterior infarct risk factors include 25 year smoking hx up to 2 ppd at one point. Elevated BP in clinic with no dx of /70 with repeat 157/77. Out of caution, prior recs from cards for stress, EKG cannot r/o anterior infarct will have pt see Dr. Garcia today to further assess need for further testing/ cardiac recs.

## 2025-01-31 ENCOUNTER — PATIENT MESSAGE (OUTPATIENT)
Dept: RESPIRATORY THERAPY | Facility: HOSPITAL | Age: 65
End: 2025-01-31
Payer: COMMERCIAL

## 2025-01-31 ENCOUNTER — ANESTHESIA EVENT (OUTPATIENT)
Dept: SURGERY | Facility: HOSPITAL | Age: 65
End: 2025-01-31
Payer: COMMERCIAL

## 2025-01-31 ENCOUNTER — TELEPHONE (OUTPATIENT)
Dept: PREADMISSION TESTING | Facility: HOSPITAL | Age: 65
End: 2025-01-31
Payer: COMMERCIAL

## 2025-01-31 NOTE — TELEPHONE ENCOUNTER
Spoke with patient this morning. /69 this morning. Got cardiac clearance yesterday as well from Dr. Garcia. Notified ABUNDIO Mcintyre

## 2025-01-31 NOTE — ANESTHESIA PREPROCEDURE EVALUATION
01/31/2025  Shirley Amaya is a 65 y.o., female.    Past Medical History:   Diagnosis Date    Abnormal glucose     Cancer     cervical    Diabetes mellitus, type 2     Hyperlipidemia     OA (osteoarthritis) of knee     YUDITH on CPAP      Past Surgical History:   Procedure Laterality Date    CERVICAL CONIZATION   W/ LASER      COLONOSCOPY N/A 11/30/2016    Procedure: COLONOSCOPY;  Surgeon: Mary Victor MD;  Location: [facility] ENDO;  Service: Endoscopy;  Laterality: N/A;    COLONOSCOPY N/A 02/28/2022    Procedure: COLONOSCOPY;  Surgeon: Andrae Barger MD;  Location: [facility] ENDO;  Service: Endoscopy;  Laterality: N/A;       Pre-op Assessment    I have reviewed the Patient Summary Reports.    I have reviewed the NPO Status.   I have reviewed the Medications.     Review of Systems  Anesthesia Hx:  No problems with previous Anesthesia   History of prior surgery of interest to airway management or planning:  Previous anesthesia: General        Denies Family Hx of Anesthesia complications.    Denies Personal Hx of Anesthesia complications.                    Social:  Non-Smoker       Hematology/Oncology:                   Hematology Comments: Chronic AC                    Cardiovascular:                hyperlipidemia                               Pulmonary:        Sleep Apnea     Obstructive Sleep Apnea (YUDITH).      Education provided regarding risk of obstructive sleep apnea            Musculoskeletal:  Arthritis        Arthritis          Neurological:      Arthritis                           Endocrine:  Diabetes, type 2    Diabetes                          Physical Exam  General: Alert and Oriented    Airway:  Mallampati: II   Mouth Opening: Normal  TM Distance: Normal  Tongue: Normal  Neck ROM: Normal ROM    Dental:  Intact    Chest/Lungs:  Clear to auscultation, Normal Respiratory  Rate    Heart:  Rate: Normal  Rhythm: Regular Rhythm        Anesthesia Plan  Type of Anesthesia, risks & benefits discussed:    Anesthesia Type: Spinal  Intra-op Monitoring Plan: Standard ASA Monitors  Post Op Pain Control Plan: multimodal analgesia and IV/PO Opioids PRN  Induction:  IV  Informed Consent: Informed consent signed with the Patient and all parties understand the risks and agree with anesthesia plan.  All questions answered. Patient consented to blood products? No  ASA Score: 2  Day of Surgery Review of History & Physical: H&P Update referred to the surgeon/provider.    Ready For Surgery From Anesthesia Perspective.     .

## 2025-02-03 ENCOUNTER — HOSPITAL ENCOUNTER (OUTPATIENT)
Facility: HOSPITAL | Age: 65
Discharge: HOME | End: 2025-02-04
Attending: ORTHOPAEDIC SURGERY | Admitting: ORTHOPAEDIC SURGERY
Payer: COMMERCIAL

## 2025-02-03 ENCOUNTER — ANESTHESIA (OUTPATIENT)
Dept: SURGERY | Facility: HOSPITAL | Age: 65
End: 2025-02-03
Payer: COMMERCIAL

## 2025-02-03 DIAGNOSIS — M17.0 BILATERAL PRIMARY OSTEOARTHRITIS OF KNEE: ICD-10-CM

## 2025-02-03 LAB
POCT GLUCOSE: 120 MG/DL (ref 70–110)
POCT GLUCOSE: 93 MG/DL (ref 70–110)

## 2025-02-03 PROCEDURE — 37000009 HC ANESTHESIA EA ADD 15 MINS: Performed by: ORTHOPAEDIC SURGERY

## 2025-02-03 PROCEDURE — 63600175 PHARM REV CODE 636 W HCPCS: Performed by: NURSE PRACTITIONER

## 2025-02-03 PROCEDURE — 63600175 PHARM REV CODE 636 W HCPCS: Performed by: NURSE ANESTHETIST, CERTIFIED REGISTERED

## 2025-02-03 PROCEDURE — 27447 TOTAL KNEE ARTHROPLASTY: CPT | Mod: AS,50,, | Performed by: NURSE PRACTITIONER

## 2025-02-03 PROCEDURE — 27200688 HC TRAY, SPINAL-HYPER/ ISOBARIC: Performed by: ANESTHESIOLOGY

## 2025-02-03 PROCEDURE — 37000008 HC ANESTHESIA 1ST 15 MINUTES: Performed by: ORTHOPAEDIC SURGERY

## 2025-02-03 PROCEDURE — 82962 GLUCOSE BLOOD TEST: CPT | Performed by: ORTHOPAEDIC SURGERY

## 2025-02-03 PROCEDURE — 71000033 HC RECOVERY, INTIAL HOUR: Performed by: ORTHOPAEDIC SURGERY

## 2025-02-03 PROCEDURE — 27201423 OPTIME MED/SURG SUP & DEVICES STERILE SUPPLY: Performed by: ORTHOPAEDIC SURGERY

## 2025-02-03 PROCEDURE — 63600175 PHARM REV CODE 636 W HCPCS: Performed by: ORTHOPAEDIC SURGERY

## 2025-02-03 PROCEDURE — 27447 TOTAL KNEE ARTHROPLASTY: CPT | Mod: 50,,, | Performed by: ORTHOPAEDIC SURGERY

## 2025-02-03 PROCEDURE — 63600175 PHARM REV CODE 636 W HCPCS: Performed by: ANESTHESIOLOGY

## 2025-02-03 PROCEDURE — 25000003 PHARM REV CODE 250: Performed by: NURSE PRACTITIONER

## 2025-02-03 PROCEDURE — D9220A PRA ANESTHESIA: Mod: ANES,,, | Performed by: ANESTHESIOLOGY

## 2025-02-03 PROCEDURE — 0055T BONE SRGRY CMPTR CT/MRI IMAG: CPT | Mod: ,,, | Performed by: ORTHOPAEDIC SURGERY

## 2025-02-03 PROCEDURE — 25000003 PHARM REV CODE 250: Performed by: ANESTHESIOLOGY

## 2025-02-03 PROCEDURE — 36000711: Performed by: ORTHOPAEDIC SURGERY

## 2025-02-03 PROCEDURE — 25000003 PHARM REV CODE 250: Performed by: NURSE ANESTHETIST, CERTIFIED REGISTERED

## 2025-02-03 PROCEDURE — C1713 ANCHOR/SCREW BN/BN,TIS/BN: HCPCS | Performed by: ORTHOPAEDIC SURGERY

## 2025-02-03 PROCEDURE — 36000710: Performed by: ORTHOPAEDIC SURGERY

## 2025-02-03 PROCEDURE — D9220A PRA ANESTHESIA: Mod: CRNA,,, | Performed by: NURSE ANESTHETIST, CERTIFIED REGISTERED

## 2025-02-03 PROCEDURE — C1776 JOINT DEVICE (IMPLANTABLE): HCPCS | Performed by: ORTHOPAEDIC SURGERY

## 2025-02-03 DEVICE — BASEPLATE GEN II TIBIAL SZ4 LT: Type: IMPLANTABLE DEVICE | Site: KNEE | Status: FUNCTIONAL

## 2025-02-03 DEVICE — IMPLANTABLE DEVICE: Type: IMPLANTABLE DEVICE | Site: KNEE | Status: FUNCTIONAL

## 2025-02-03 DEVICE — LEGION POSTERIOR STABILIZED HIGH                                    FLEX HIGHLY CROSS LINKED                                    POLYETHYLENE SIZE 3-4 9MM
Type: IMPLANTABLE DEVICE | Site: KNEE | Status: FUNCTIONAL
Brand: LEGION

## 2025-02-03 DEVICE — LEGION POSTERIOR STABILIZED                                    OXINIUM FEMORAL SIZE 4 RIGHT
Type: IMPLANTABLE DEVICE | Site: KNEE | Status: FUNCTIONAL
Brand: LEGION

## 2025-02-03 DEVICE — GENESIS II NON-POROUS TIBIAL                                    BASEPLATE SIZE 4 RIGHT
Type: IMPLANTABLE DEVICE | Site: KNEE | Status: FUNCTIONAL
Brand: GENESIS II

## 2025-02-03 RX ORDER — TRANEXAMIC ACID 10 MG/ML
1000 INJECTION, SOLUTION INTRAVENOUS
Status: COMPLETED | OUTPATIENT
Start: 2025-02-03 | End: 2025-02-03

## 2025-02-03 RX ORDER — CEFAZOLIN 2 G/1
2 INJECTION, POWDER, FOR SOLUTION INTRAMUSCULAR; INTRAVENOUS
Status: COMPLETED | OUTPATIENT
Start: 2025-02-03 | End: 2025-02-03

## 2025-02-03 RX ORDER — EPHEDRINE SULFATE 50 MG/ML
INJECTION, SOLUTION INTRAVENOUS
Status: DISCONTINUED | OUTPATIENT
Start: 2025-02-03 | End: 2025-02-03

## 2025-02-03 RX ORDER — LIDOCAINE HYDROCHLORIDE 20 MG/ML
INJECTION, SOLUTION EPIDURAL; INFILTRATION; INTRACAUDAL; PERINEURAL
Status: DISCONTINUED | OUTPATIENT
Start: 2025-02-03 | End: 2025-02-03

## 2025-02-03 RX ORDER — FENTANYL CITRATE 50 UG/ML
INJECTION, SOLUTION INTRAMUSCULAR; INTRAVENOUS
Status: DISCONTINUED | OUTPATIENT
Start: 2025-02-03 | End: 2025-02-03

## 2025-02-03 RX ORDER — TRANEXAMIC ACID 10 MG/ML
INJECTION, SOLUTION INTRAVENOUS
Status: COMPLETED
Start: 2025-02-03 | End: 2025-02-03

## 2025-02-03 RX ORDER — PHENYLEPHRINE HYDROCHLORIDE 10 MG/ML
INJECTION INTRAVENOUS
Status: DISCONTINUED | OUTPATIENT
Start: 2025-02-03 | End: 2025-02-03

## 2025-02-03 RX ORDER — ONDANSETRON HYDROCHLORIDE 2 MG/ML
INJECTION, SOLUTION INTRAVENOUS
Status: DISCONTINUED | OUTPATIENT
Start: 2025-02-03 | End: 2025-02-03

## 2025-02-03 RX ORDER — ALBUTEROL SULFATE 0.83 MG/ML
2.5 SOLUTION RESPIRATORY (INHALATION) EVERY 4 HOURS PRN
Status: DISCONTINUED | OUTPATIENT
Start: 2025-02-03 | End: 2025-02-04 | Stop reason: HOSPADM

## 2025-02-03 RX ORDER — DIPHENHYDRAMINE HYDROCHLORIDE 50 MG/ML
25 INJECTION INTRAMUSCULAR; INTRAVENOUS EVERY 6 HOURS PRN
Status: DISCONTINUED | OUTPATIENT
Start: 2025-02-03 | End: 2025-02-04 | Stop reason: HOSPADM

## 2025-02-03 RX ORDER — ONDANSETRON HYDROCHLORIDE 2 MG/ML
4 INJECTION, SOLUTION INTRAVENOUS ONCE AS NEEDED
Status: DISCONTINUED | OUTPATIENT
Start: 2025-02-03 | End: 2025-02-04 | Stop reason: HOSPADM

## 2025-02-03 RX ORDER — BUPIVACAINE 13.3 MG/ML
INJECTION, SUSPENSION, LIPOSOMAL INFILTRATION
Status: DISPENSED
Start: 2025-02-03 | End: 2025-02-03

## 2025-02-03 RX ORDER — PROPOFOL 10 MG/ML
VIAL (ML) INTRAVENOUS
Status: DISCONTINUED | OUTPATIENT
Start: 2025-02-03 | End: 2025-02-03

## 2025-02-03 RX ORDER — PREGABALIN 75 MG/1
150 CAPSULE ORAL ONCE
Status: COMPLETED | OUTPATIENT
Start: 2025-02-03 | End: 2025-02-03

## 2025-02-03 RX ORDER — FENTANYL CITRATE 50 UG/ML
25 INJECTION, SOLUTION INTRAMUSCULAR; INTRAVENOUS EVERY 5 MIN PRN
Status: DISCONTINUED | OUTPATIENT
Start: 2025-02-03 | End: 2025-02-04 | Stop reason: HOSPADM

## 2025-02-03 RX ORDER — BUPIVACAINE HYDROCHLORIDE 7.5 MG/ML
INJECTION, SOLUTION INTRASPINAL
Status: DISCONTINUED | OUTPATIENT
Start: 2025-02-03 | End: 2025-02-03

## 2025-02-03 RX ORDER — PROPOFOL 10 MG/ML
VIAL (ML) INTRAVENOUS CONTINUOUS PRN
Status: DISCONTINUED | OUTPATIENT
Start: 2025-02-03 | End: 2025-02-03

## 2025-02-03 RX ORDER — KETAMINE HYDROCHLORIDE 100 MG/ML
INJECTION, SOLUTION INTRAMUSCULAR; INTRAVENOUS
Status: DISCONTINUED | OUTPATIENT
Start: 2025-02-03 | End: 2025-02-03

## 2025-02-03 RX ORDER — ACETAMINOPHEN 500 MG
1000 TABLET ORAL ONCE
Status: COMPLETED | OUTPATIENT
Start: 2025-02-03 | End: 2025-02-03

## 2025-02-03 RX ORDER — MIDAZOLAM HYDROCHLORIDE 1 MG/ML
INJECTION INTRAMUSCULAR; INTRAVENOUS
Status: DISCONTINUED | OUTPATIENT
Start: 2025-02-03 | End: 2025-02-03

## 2025-02-03 RX ORDER — KETOROLAC TROMETHAMINE 30 MG/ML
INJECTION, SOLUTION INTRAMUSCULAR; INTRAVENOUS
Status: DISCONTINUED | OUTPATIENT
Start: 2025-02-03 | End: 2025-02-03 | Stop reason: HOSPADM

## 2025-02-03 RX ORDER — BUPIVACAINE HYDROCHLORIDE 2.5 MG/ML
INJECTION, SOLUTION EPIDURAL; INFILTRATION; INTRACAUDAL
Status: DISCONTINUED | OUTPATIENT
Start: 2025-02-03 | End: 2025-02-03 | Stop reason: HOSPADM

## 2025-02-03 RX ORDER — SODIUM CHLORIDE 9 MG/ML
INJECTION, SOLUTION INTRAVENOUS CONTINUOUS
Status: DISCONTINUED | OUTPATIENT
Start: 2025-02-03 | End: 2025-02-04 | Stop reason: HOSPADM

## 2025-02-03 RX ORDER — BUPIVACAINE HYDROCHLORIDE 2.5 MG/ML
INJECTION, SOLUTION EPIDURAL; INFILTRATION; INTRACAUDAL
Status: DISPENSED
Start: 2025-02-03 | End: 2025-02-03

## 2025-02-03 RX ORDER — HYDROCODONE BITARTRATE AND ACETAMINOPHEN 5; 325 MG/1; MG/1
1 TABLET ORAL
Status: DISCONTINUED | OUTPATIENT
Start: 2025-02-03 | End: 2025-02-04 | Stop reason: HOSPADM

## 2025-02-03 RX ORDER — CELECOXIB 100 MG/1
200 CAPSULE ORAL ONCE
Status: COMPLETED | OUTPATIENT
Start: 2025-02-03 | End: 2025-02-03

## 2025-02-03 RX ORDER — BUPIVACAINE 13.3 MG/ML
INJECTION, SUSPENSION, LIPOSOMAL INFILTRATION
Status: DISCONTINUED | OUTPATIENT
Start: 2025-02-03 | End: 2025-02-03 | Stop reason: HOSPADM

## 2025-02-03 RX ADMIN — PHENYLEPHRINE HYDROCHLORIDE 100 MCG: 10 INJECTION INTRAVENOUS at 12:02

## 2025-02-03 RX ADMIN — CELECOXIB 200 MG: 100 CAPSULE ORAL at 11:02

## 2025-02-03 RX ADMIN — EPHEDRINE SULFATE 10 MG: 50 INJECTION INTRAVENOUS at 12:02

## 2025-02-03 RX ADMIN — DEXAMETHASONE SODIUM PHOSPHATE 11 MG: 4 INJECTION INTRA-ARTICULAR; INTRALESIONAL; INTRAMUSCULAR; INTRAVENOUS; SOFT TISSUE at 11:02

## 2025-02-03 RX ADMIN — TRANEXAMIC ACID 1000 MG: 10 INJECTION, SOLUTION INTRAVENOUS at 12:02

## 2025-02-03 RX ADMIN — SODIUM CHLORIDE, POTASSIUM CHLORIDE, SODIUM LACTATE AND CALCIUM CHLORIDE: 600; 310; 30; 20 INJECTION, SOLUTION INTRAVENOUS at 03:02

## 2025-02-03 RX ADMIN — FENTANYL CITRATE 50 MCG: 50 INJECTION, SOLUTION INTRAMUSCULAR; INTRAVENOUS at 12:02

## 2025-02-03 RX ADMIN — EPHEDRINE SULFATE 5 MG: 50 INJECTION INTRAVENOUS at 12:02

## 2025-02-03 RX ADMIN — TRANEXAMIC ACID 1000 MG: 10 INJECTION, SOLUTION INTRAVENOUS at 03:02

## 2025-02-03 RX ADMIN — FENTANYL CITRATE 40 MCG: 50 INJECTION, SOLUTION INTRAMUSCULAR; INTRAVENOUS at 01:02

## 2025-02-03 RX ADMIN — HYDROCODONE BITARTRATE AND ACETAMINOPHEN 1 TABLET: 5; 325 TABLET ORAL at 09:02

## 2025-02-03 RX ADMIN — PHENYLEPHRINE HYDROCHLORIDE 200 MCG: 10 INJECTION INTRAVENOUS at 12:02

## 2025-02-03 RX ADMIN — LIDOCAINE HYDROCHLORIDE 70 MG: 20 INJECTION, SOLUTION EPIDURAL; INFILTRATION; INTRACAUDAL; PERINEURAL at 12:02

## 2025-02-03 RX ADMIN — MIDAZOLAM HYDROCHLORIDE 2 MG: 1 INJECTION, SOLUTION INTRAMUSCULAR; INTRAVENOUS at 12:02

## 2025-02-03 RX ADMIN — GLYCOPYRROLATE 0.2 MG: 0.2 INJECTION, SOLUTION INTRAMUSCULAR; INTRAVENOUS at 12:02

## 2025-02-03 RX ADMIN — EPHEDRINE SULFATE 5 MG: 50 INJECTION INTRAVENOUS at 03:02

## 2025-02-03 RX ADMIN — SODIUM CHLORIDE, POTASSIUM CHLORIDE, SODIUM LACTATE AND CALCIUM CHLORIDE: 600; 310; 30; 20 INJECTION, SOLUTION INTRAVENOUS at 12:02

## 2025-02-03 RX ADMIN — PREGABALIN 150 MG: 75 CAPSULE ORAL at 11:02

## 2025-02-03 RX ADMIN — ONDANSETRON 4 MG: 2 INJECTION INTRAMUSCULAR; INTRAVENOUS at 03:02

## 2025-02-03 RX ADMIN — ACETAMINOPHEN 1000 MG: 500 TABLET ORAL at 11:02

## 2025-02-03 RX ADMIN — PROPOFOL 100 MCG/KG/MIN: 10 INJECTION, EMULSION INTRAVENOUS at 12:02

## 2025-02-03 RX ADMIN — CEFAZOLIN 2 G: 2 INJECTION, POWDER, FOR SOLUTION INTRAMUSCULAR; INTRAVENOUS at 12:02

## 2025-02-03 RX ADMIN — FENTANYL CITRATE 10 MCG: 50 INJECTION, SOLUTION INTRAMUSCULAR; INTRAVENOUS at 12:02

## 2025-02-03 RX ADMIN — KETAMINE HYDROCHLORIDE 25 MG: 100 INJECTION, SOLUTION, CONCENTRATE INTRAMUSCULAR; INTRAVENOUS at 12:02

## 2025-02-03 RX ADMIN — BUPIVACAINE HYDROCHLORIDE 15 MG: 7.5 INJECTION, SOLUTION INTRASPINAL at 12:02

## 2025-02-03 RX ADMIN — PROPOFOL 50 MG: 10 INJECTION, EMULSION INTRAVENOUS at 12:02

## 2025-02-03 RX ADMIN — PHENYLEPHRINE HYDROCHLORIDE 100 MCG: 10 INJECTION INTRAVENOUS at 03:02

## 2025-02-03 RX ADMIN — PHENYLEPHRINE HYDROCHLORIDE 100 MCG: 10 INJECTION INTRAVENOUS at 02:02

## 2025-02-03 RX ADMIN — SODIUM CHLORIDE, POTASSIUM CHLORIDE, SODIUM LACTATE AND CALCIUM CHLORIDE: 600; 310; 30; 20 INJECTION, SOLUTION INTRAVENOUS at 01:02

## 2025-02-03 NOTE — DISCHARGE SUMMARY
[facility] - Periop Services  Discharge Note  Short Stay    Procedure(s): ARTHROPLASTY, KNEE, TOTAL (Bilateral)       OUTCOME: Patient tolerated treatment/procedure well without complication and is now ready for discharge.    Hospital Course:  65-year-old  female with bilateral knees severe degenerative osteoarthritis with a varus deformity.  She underwent bilateral total knee replacements under spinal anesthesia.  Periarticular injection was performed with the Exparel plus Toradol plus Marcaine.  She received Ancef for antibiotic prophylaxis.  Eliquis for DVT prophylaxis started postoperatively.  Surgery was without difficulty or complication.  Tourniquet was used on both legs for about 57-58 minutes.  She started postop range of motion and weight-bearing protocol.  Plan for home discharge a.m. tomorrow.    DISPOSITION: Home or Self Care    FINAL DIAGNOSIS:  Bilateral primary osteoarthritis of knee    FOLLOWUP: In clinic    DISCHARGE INSTRUCTIONS:    After Knee Surgery    Follow your surgeons instructions.   Ice and Elevate your knee.   EREN dressing to remain on until follow up appointment. You can cut the tubing to the drain in 7 days.   You can shower, no submerging your knee in water.   Weight bearing as tolerated with walker, advance to cane as tolerated.  Range of Motion as tolerated.   Resume home diet.  Start Physical Therapy as scheduled.  Follow up with Dr. Good or physician assistant, Praag Wilkinson in approximately 10 days, as scheduled.    Relieve pain as directed using prescriptions provided by Dr. Good.    Call if you have...  A fever higher than 100.4°F (38.0°C) taken by mouth  Side effects from your medicine, such as prolonged nausea  A wet or loose dressing, or a dressing that is too tight  Excessive bleeding  Increased, ongoing pain or numbness  Signs of infection (such as drainage, warmth, or redness) at the incision site              TIME SPENT ON DISCHARGE: 10 minutes

## 2025-02-03 NOTE — INTERVAL H&P NOTE
The patient has been examined and the H&P has been reviewed:    I concur with the findings and no changes have occurred since H&P was written.    Surgery risks, benefits and alternative options discussed and understood by patient/family.          Active Hospital Problems    Diagnosis  POA    *Bilateral primary osteoarthritis of knee [M17.0]  Yes      Resolved Hospital Problems   No resolved problems to display.

## 2025-02-03 NOTE — OP NOTE
OPERATIVE REPORT  Date of Proceure:2/3/2025   Name:Shirley Amaya  MRN:[id number]      SURGEON  Aniceto Good MD, Orthopedic Surgeon    FIRST ASSISTANT  Connie Goodwin, [facility], Family Nurse Practitioner  A first assistant was need during the entire case for patient positioning/draping, skin and soft tissue retraction,   bone preparation and implant trialing, implant placement and cementation, deep tissue and skin closure, and wound dressing application.    ANESTHESIA  AG with CRNA, spinal anesthetic.     PREOPERATIVE DIAGNOSIS  Bilateral Knee, Severe Degenerative Osteoarthritis.    POSTOPERATIVE DIAGNOSIS  Bilateral Knee Severe, Degenerative Osteoarthritis.    PROCEDURE  RIGHT total knee arthroplasty.  LEFT total knee arthroplasty.    Implants:     Right: Femur: 4 Oxinium , posterior cruciate substituting.    Tibia: 4 Yoli II.    Patella: 23 mm Biconvex.    Poly: 9 mm XLPE.    Cement:  [employer]hew Rally MV.     Left: Femur: 4 , posterior cruciate substituting.    Tibia: 4 Yoli II.    Patella: 23 mm Biconvex.    Poly: 9 mm XLPE.    Cement: [employer]hew Rally MV.    INJECTABLES  1. Exparel 266mg = 30 mg Toradol + 24 ml 0.25% marcaine expanded to 120 cc, 60 cc per knee  2. Tranexamic acid, IV injection protocol.    ANTIBIOTIC  Ancef 2 grams, IV piggyback preop.    DRAINS  None.    SPECIMENS  Bone and cartilage femur, tibia    TOURNIQUET TIME  58 minutes, Right  57 minutes, Left    BLOOD LOSS  75 cc, Right  75 cc Left    COMPLICATIONS  None.    CONDITION  Stable to recovery room.    DISPOSITION  Total Knee Arthroplasty Protocol, Admission to the hospital.    OPERATIVE INDICATIONS  The patient has progressive osteoarthritis in the knees.  Despite conservative treatment, the patient has progression of pain and  decreased activity of daily living.  The patient has elected to proceed with bilateral knee  arthroplasty as a definitive solution.  The  risks, benefits, potential  complications, and surgical alternatives were discussed.  Informed  consent was obtained.    Findings:       Right: Bone: Excellent    Patella: Resurfaced    PCL: sacrificed    PFT: central, no lateral release    Deformity: 7.5 degrees Varus    Pre ROM:     Post ROM: 0-150+     Left: Bone: Excellent    Patella: Resurfaced    PCL: sacrificed    PFT: central, no lateral release    Deformity: 9.4 degrees Varus    Pre ROM:     Post ROM: 0-150+    PROCEDURE IN DETAIL  The patient was brought to the operating room, administered spinal anesthesia, positioned supine, prepped and draped, exposing both knees to the operative field, occluding the skin with Ioban. A stockinette was secured with a Coban on each leg.    After prep and drape was performed, a time-out was called.    The patient was identified and both knees were  identified as the proposed operative sites, beginning with the right  knee first.  The leg was elevated, exsanguinated, and tourniquet  inflated to 250 mmHg followed by flexion of the knee.  A midline skin  incision and a medial parapatellar approach was performed. Standard soft tissue  dissection of the infrapatellar fat pad, medial and lateral menisci and ACL/PCL was performed.  The patella was everted and bony preparation of the patella was performed with a reamer  for the selected size Biconvex Inset patella.  The lateral 2-3 mm of the patellar facet was resected vertically with the ronguer .    The femur was prepared with the Visionaire cutting block for the selected size femur, posterior cruciate substituting.  The distal resection was performed through the cutting block.  The 4-in-1 cutting block was then placed.  The anterior and posterior femoral cuts were performed, followed by the chamfer cuts.  Retractors were placed to deliver the tibia forward.    The tibia was prepared for the selected size tibia, Yoli II, with the Visionaire cutting block.  The  cutting block was pinned to the tibia, and the tibial resection was performed with the saw.  The selected tibial baseplate was positioned and pinned on the proximal tibia.  Tibial keel punch was performed and the box cut of the femur was performed.  The tibial polyethylene trial was placed into the tibial tray.  Trial implantation brought the knee to full extension and restored the angulation, and allowed the knee to flex to 150+.  The knee was stable at 0, 30, 60, 90, and 120 degrees of flexion.    The trials were removed.  Pulsatile lavage of the bone was performed and  the bone was dried.  The posterior capsular tissue, medial and lateral capsular tissue and suprapatellar tissue were treated with the Werewolf bipolar cautery .      Cement was mixed with Smith [employer] Nephew Rally MV bone  cement under suction vacuum.  The implants were then placed, tibia  followed by femur, and then the patella.  Patella was clamped and the  knee was put in full extension with the trial polyethylene in place. Excess cement was removed from periphery of all components and the cement was allowed to fully harden.      Exparel/Toradol/Marcaine mixture was injected in the posterior joint capsule, medial and lateral joint capsule, in the suprapatellar region, and the retinacular incision.  The final polyethylene was then snapped into the tibial tray after irrigation of the joint space.  Repeat irrigation was  performed followed by closure of the retinaculum with #2 Vicryl  interrupted figure-of-eight suture and a running #2 Quill PDO suture,   proximally and distally from the mid patella for watertight closure.  The tourniquet was  deflated ,  and subcutaneous bleeders were controlled with cautery.  SubQ  closure was performed with 2-0 Vicryl interrupted sutures and 3-0 StrataFix PDO/PCL running suture.  A temporary bandage was applied to the knee. Attention was then turned to the opposite knee.    The left knee procedure was then performed  in a similar fashion, elevating the leg,  exsanguinating and inflating the tourniquet to 250 mm Hg.  Midline skin  incision was performed with a medial parapatellar approach. Bony and soft tissue preparation was performed in a similar fashion as described for the first knee.  The selected size biconvex patella,  the selected size femur posterior cruciate substituting, and the selected size Yoli II tibia were placed. Trial implantation with the selected polyethylene brought the knee to full extension, restored the angulation, and allowed flexion to 150+. The knee was stable at 0, 30, 60, 90, and 120 degrees of flexion.    Placement of the final implants was performed in a similar fashion to the first knee, with  pulsatile lavage of the bone surfaces,  bone drying, and cementation technique.  The final polyethylene was snapped into the tibial tray.  Irrigation of the joint space was performed. Exparel/Toradol/Marcaine was injected in a  similar fashion to the first knee. Then closure was performed in an  identical fashion to the first knee.  Final dressings were placed on  both the right and the left knees, utilizing a 30 cm EREN negative pressure dressing on each knee, secured with an Ace Wrap.    The patient was transferred to a hospital bed and taken into the postanesthesia recovery area in stable and satisfactory condition.        Aniceto Good M.D.  Orthopedic Surgeon  [facility]

## 2025-02-03 NOTE — PLAN OF CARE
Reviewed and completed all PACU orders. I encouraged questions, answered them thoroughly, and evaluated my instructions via teach-back method. I have disconnected patient from monitoring equipment and prepared them for the next phase of care  Patient has met all PACU discharge criteria at this point. Patient and family agree with the plan of care. Report given to NURSE Bey. Patient moved to [facility] room 4 for extended recovery/overnight stay.

## 2025-02-03 NOTE — ANESTHESIA PROCEDURE NOTES
Spinal    Diagnosis: DJD  Patient location during procedure: OR    Staffing  Authorizing Provider: Larisa Moreland MD  Performing Provider: Larisa Moreland MD    Staffing  Other anesthesia staff: Connie Mendiola CRNA  Performed by: Larisa Moreland MD  Authorized by: Larisa Moreland MD    Preanesthetic Checklist  Completed: patient identified, IV checked, site marked, risks and benefits discussed, surgical consent, monitors and equipment checked, pre-op evaluation and timeout performed  Spinal Block  Patient position: sitting  Prep: Betadine  Patient monitoring: heart rate, cardiac monitor, continuous pulse ox, continuous capnometry and frequent blood pressure checks  Approach: midline  Location: L3-4  Injection technique: lumbar puncture  CSF Fluid: clear free-flowing CSF  Needle  Needle type: pencil-tip   Needle gauge: 25 G  Needle length: 3.5 in  Additional Documentation: incremental injection, negative aspiration for heme and no paresthesia on injection  Needle localization: anatomical landmarks  Assessment  Patient's tolerance of the procedure: comfortable throughout block and no complaints

## 2025-02-03 NOTE — TRANSFER OF CARE
"Anesthesia Transfer of Care Note    Patient: Shirley Amaya    Procedure(s) Performed: Procedure(s) (LRB):  ARTHROPLASTY, KNEE, TOTAL (Bilateral)    Patient location: PACU    Anesthesia Type: spinal and MAC    Transport from OR: Transported from OR on room air with adequate spontaneous ventilation    Post pain: adequate analgesia    Post assessment: no apparent anesthetic complications and tolerated procedure well    Post vital signs: stable    Level of consciousness: awake    Nausea/Vomiting: no nausea/vomiting    Complications: none    Transfer of care protocol was followed      Last vitals: Visit Vitals  BP (!) 142/66 (BP Location: Right arm, Patient Position: Sitting)   Pulse 62   Temp 36.2 °C (97.1 °F) (Temporal)   Resp 17   Ht 5' 4" (1.626 m)   Wt 74 kg (163 lb 2.3 oz)   LMP  (LMP Unknown)   SpO2 96%   Breastfeeding No   BMI 28.00 kg/m²     "

## 2025-02-04 ENCOUNTER — DOCUMENTATION ONLY (OUTPATIENT)
Dept: REHABILITATION | Facility: HOSPITAL | Age: 65
End: 2025-02-04
Payer: COMMERCIAL

## 2025-02-04 VITALS
TEMPERATURE: 98 F | RESPIRATION RATE: 18 BRPM | WEIGHT: 163.13 LBS | HEART RATE: 66 BPM | OXYGEN SATURATION: 96 % | HEIGHT: 64 IN | DIASTOLIC BLOOD PRESSURE: 64 MMHG | BODY MASS INDEX: 27.85 KG/M2 | SYSTOLIC BLOOD PRESSURE: 132 MMHG

## 2025-02-04 PROCEDURE — 25000003 PHARM REV CODE 250: Performed by: ANESTHESIOLOGY

## 2025-02-04 RX ADMIN — HYDROCODONE BITARTRATE AND ACETAMINOPHEN 1 TABLET: 5; 325 TABLET ORAL at 03:02

## 2025-02-04 NOTE — CARE UPDATE
Patient seen by PT and cleared for discharge. Discharge instructions given at bedside to patient and family. Patient verbally understands post op instructions. D/C IV with catheter intact. Wheeled patient to personal vehicle. Patient stable upon discharge.

## 2025-02-04 NOTE — ANESTHESIA POSTPROCEDURE EVALUATION
Anesthesia Post Evaluation    Patient: Shirley Amaya    Procedure(s) Performed: Procedure(s) (LRB):  ARTHROPLASTY, KNEE, TOTAL (Bilateral)    Final Anesthesia Type: spinal      Patient location during evaluation: PACU  Patient participation: Yes- Able to Participate  Level of consciousness: awake and alert and oriented  Post-procedure vital signs: reviewed and stable  Pain management: adequate  Airway patency: patent    PONV status at discharge: No PONV  Anesthetic complications: no      Cardiovascular status: blood pressure returned to baseline, stable and hemodynamically stable  Respiratory status: unassisted  Hydration status: euvolemic  Follow-up not needed.              Vitals Value Taken Time   /64 02/03/25 1948   Temp 36.7 °C (98 °F) 02/03/25 1948   Pulse 66 02/03/25 1948   Resp 18 02/04/25 0319   SpO2 96 % 02/03/25 2001         Event Time   Out of Recovery 17:10:00         Pain/Bismark Score: Pain Rating Prior to Med Admin: 3 (2/4/2025  3:19 AM)  Bismark Score: 10 (2/4/2025  5:06 AM)

## 2025-02-04 NOTE — PROGRESS NOTES
[id number]    Patient was educated on walking quality with increased bilateral knee flexion. She ambulated 120 feet with rolling walker with weight bearing as tolerated.     Sitting on edge of bed x 12 each: long arch quad with end range hold for 1 second to get a good quad contraction, supine heel slides with self forced flexion by her pulling at the posterior of her thigh, quad sets, and glut sets.     Educated on not to rest with pillow under knees but to instead to maintain knee extension while resting at home, the importance of performing home exercise program between discharge and initiation of outpatient physical therapy evaluation, and to walk with increased knee flexion/extension for normal gait pattern.      Overall, patient with less active range of motion and pain at right knee compared to left.

## 2025-02-04 NOTE — CARE UPDATE
Received patient from DanaChanda/Cassie, AAOX3, no respiratory distress observed. Family members at bedside. Surgical sites are dry and intact, Pico7 wound vac intact, no tingling and numbness sensation , able to move both legs and dorsalis pedis present.Safety measures intact and ongoing. Tolerated the foods given. Clinical condition is stable. Therapeutic environment provided.

## 2025-02-04 NOTE — CARE UPDATE
Spent a quiet and comfortable night. Pain is controlled. Surgical site is dry and intact. Able to ambulate in the room with assistive device. Safety measures intact and ongoing. Care continues

## 2025-02-04 NOTE — CARE UPDATE
Received patient from Roxana BRENNER, AAOx3. Family at bedside. Patient resting comfortably. Surgica rite to bilateral knees clean and intact. Safety measures in place. Patient stable at this time. Call light within reach.

## 2025-02-04 NOTE — CARE UPDATE
Patient has decided that she wants to stay overnight. Patient is up for the first time since arriving to room. Up with assistance and patient able to ambulate to hospital bed with use of walker. Patient voices no discomfort. Patient to hospital bed and catheter removed without any problems. Encouraged patient to drink plenty water. Safety measures in place. Family members at bedside. Will continue to monitor closely.

## 2025-02-07 ENCOUNTER — CLINICAL SUPPORT (OUTPATIENT)
Dept: REHABILITATION | Facility: HOSPITAL | Age: 65
End: 2025-02-07
Attending: ORTHOPAEDIC SURGERY
Payer: COMMERCIAL

## 2025-02-07 DIAGNOSIS — M17.0 BILATERAL PRIMARY OSTEOARTHRITIS OF KNEE: Primary | ICD-10-CM

## 2025-02-07 DIAGNOSIS — M17.12 PRIMARY OSTEOARTHRITIS OF LEFT KNEE: ICD-10-CM

## 2025-02-07 DIAGNOSIS — M17.11 PRIMARY OSTEOARTHRITIS OF RIGHT KNEE: ICD-10-CM

## 2025-02-07 PROCEDURE — 97140 MANUAL THERAPY 1/> REGIONS: CPT | Mod: PN

## 2025-02-07 PROCEDURE — 97010 HOT OR COLD PACKS THERAPY: CPT | Mod: PN

## 2025-02-07 PROCEDURE — 97110 THERAPEUTIC EXERCISES: CPT | Mod: PN

## 2025-02-07 PROCEDURE — 97530 THERAPEUTIC ACTIVITIES: CPT | Mod: PN

## 2025-02-07 NOTE — PROGRESS NOTES
Outpatient Rehab    Physical Therapy Progress Note    Patient Name: Shirley Amaya  MRN: [id number]  YOB: 1960  Today's Date: 2/7/2025    Therapy Diagnosis:   Encounter Diagnoses   Name Primary?    Primary osteoarthritis of right knee     Primary osteoarthritis of left knee     Bilateral primary osteoarthritis of knee Yes     Physician: Aniceto Good MD    Physician Orders: Eval and Treat  Medical Diagnosis: primary osteoarthritis of bilateral knees     Visit # / Visits Authorized:  1 / 10   Date of Evaluation: 2/7/2025 reevaluation after surgery.   Insurance Authorization Period: 1/27/2025 to 12/31/2025  Plan of Care Certification:  2/7/2025 to 3/21/2025      Time In: 0930   Time Out: 1030  Total Time: 60   Total Billable Time: 60         Subjective   History of Present Illness  Shirley is a 65 y.o. female who reports to occupational therapy with a chief concern of B knee pain. According to the patient's chart, Shirley has a past medical history of Abnormal glucose, Cancer, Diabetes mellitus, type 2, Hyperlipidemia, OA (osteoarthritis) of knee, and YUDITH on CPAP. Shirley has a past surgical history that includes Cervical conization w/ laser; Colonoscopy (N/A, 11/30/2016); Colonoscopy (N/A, 02/28/2022); and Total knee arthroplasty (Bilateral, 2/3/2025).    The patient reports a medical diagnosis of primary osteoarthritis of bilateral knees.  Patient reports a surgery of Bilateral total knee replacements. Surgery occurred on 02/03/25.         Dominant Hand: Right  History of Present Condition/Illness: Patient returns to PT now status post bilateral total knee replacements done Monday 2/3/2025.  She is doing well and walking with RW and WBAT Bilateral with wound drying applications on each knee that she reports she is to wear until battery wears out on each side.  She has dressing over each surgical incision with clear surgical tape over the bandage and negative pressure wound vacuum attached on  each side.  She reports no pain at rest but moderate pain with motion.    Pain     Patient reports a current pain level of 0/10. Pain at best is reported as 0/10. Pain at worst is reported as 6/10.   Location: Bilateral knee anterior left greater than right side.  Clinical Progression (since onset): Improved  Pain Qualities: Aching, Discomfort, Sharp, Tenderness, Tightness  Pain-Relieving Factors: Activity modification, Elevation, Exercise, Ice, Change in position, Medications - prescription, Movement, Physical therapy, Support, Sitting  Pain-Aggravating Factors: Walking, Lifting, Bending, Standing           Objective   Posture                 Left knee position is: Flexed       Knee Observations  Right Knee Observations  Present: Ecchymosis and Edema  Left Knee Observations  Present: Ecchymosis and Edema             Knee Swelling  Location of Measurement Right  (cm) Left  (cm)   20 cm Above Joint Line       10 cm Vastus Medialis Oblique       At Joint Line 41 41.5   15 cm Below Joint Line                 Knee Range of Motion   Right Knee   Active (deg) Passive (deg) Pain   Flexion 100       Extension 0 0         Left Knee   Active (deg) Passive (deg) Pain   Flexion 95       Extension 5 0         Girth of knee at midline:  left and right 35.5 cm at presurgery.  Post surgery 41 cm right and 41.5 cm left.              Knee Strength   Right Strength Right Pain Left Strength Left  Pain   Flexion (S2) 3   3     Prone Flexion           Extension (L3) 3-   3-                   Ambulation Assistance Required  Surface With  Assistive Device Without Assistive Device Details   Level Independent Unable      Uneven Unable Unable     Curb           Gait Analysis  Base of Support: Normal  Gait Pattern: Antalgic  Walking Speed: Decreased    Right Side Walking Observations  Decreased: Stance Time, Swing Time, and Step Length       Left Side Walking Observations  Decreased: Stance Time, Swing Time, and Step Length           Intake  Outcome Measure for FOTO Survey    Therapist reviewed FOTO scores for Shirley Amaya on 2/7/2025.   FOTO report - see Media section or FOTO account episode details.     Intake Score: 31%  Survey Score 1:  %  Survey Score 2:  %    Treatment:    Treatments performed today are in BOLD:    CPT Duration/ Details  2/7/2025   TE Seated on foam: heel slides R and L skateboard 5 min each  Gastroc slant board 3 min  LAQ seated 10 reps each leg  SAQ supine full roll 10 reps each leg  Quad sets supine 10 reps 3 sets R and L  ICE to B Knees with elevation and AP, QS, GS x 10 min   TE Time 35 min   TA Nu Step s=1 l=1 10 min  Gait with RW working on step through pattern.   TA Time 10 min   NMR Single balance 5 x 30 sed with UE PRN on bars   NMR Time 5 min   MT PROM knee flexion and extension R and L   MT Time 15 min   PLAN Cont POC per protocol.       Patient's spiritual, cultural, and educational needs considered and patient agreeable to plan of care and goals.     Assessment & Plan   Assessment: Patient returns post op B knee replacements and doing well.  She has swelling, pain, decreased joint mobillty, decreased strength and decreased functional mobility.  Evaluation/Treatment Tolerance: Patient tolerated treatment well  Education  Education was done with Patient. The patient's learning style includes Demonstration, Listening, and Pictures/video. The patient Demonstrates understanding and Verbalizes understanding.                 Plan: PT 2 x per week for 6 weeks post-op.    Goals:   Active       Ambulation/movement       Patient will ambulate with normal gait pattern with no device.       Start:  02/07/25    Expected End:  03/21/25               Functional outcome       Patient will show a significant change in FOTO patient-reported outcome tool to 70 to demonstrate subjective improvement       Start:  02/07/25    Expected End:  03/21/25            Patient stated goal: Patient would like to return to her prior level  of function without knee pain.        Start:  02/07/25    Expected End:  03/21/25            Patient will demonstrate independence in home program for support of progression       Start:  02/07/25    Expected End:  03/21/25               Pain       Patient will report pain of 3/10 demonstrating a reduction of overall pain       Start:  02/07/25    Expected End:  03/21/25            Patient will report a 2 point reduction in pain while performing ADL's.       Start:  02/07/25    Expected End:  02/28/25               Range of Motion       Patient will achieve bilateral knee ROM of  0-120 degrees        Start:  02/07/25    Expected End:  03/21/25               Strength       Patient will achieve bilateral knee flexion strength of 4+/5       Start:  02/07/25    Expected End:  03/21/25            Patient will achieve bilateral knee extension strength of 4+/5       Start:  02/07/25    Expected End:  03/21/25              Resolved       Physical Therapy  prehab education and HEP       Assess ROM, strength, function, gait and swelling post-op and set new goals base on post-op measures. (Met)       Start:  01/28/25    Expected End:  02/07/25    Resolved:  02/07/25             Thierno Neff PT

## 2025-02-10 ENCOUNTER — PATIENT MESSAGE (OUTPATIENT)
Dept: ORTHOPEDICS | Facility: CLINIC | Age: 65
End: 2025-02-10
Payer: COMMERCIAL

## 2025-02-10 DIAGNOSIS — M17.12 PRIMARY OSTEOARTHRITIS OF LEFT KNEE: ICD-10-CM

## 2025-02-10 DIAGNOSIS — M17.11 PRIMARY OSTEOARTHRITIS OF RIGHT KNEE: Primary | ICD-10-CM

## 2025-02-10 RX ORDER — TRAMADOL HYDROCHLORIDE 50 MG/1
50 TABLET ORAL EVERY 4 HOURS PRN
Qty: 56 TABLET | Refills: 0 | Status: SHIPPED | OUTPATIENT
Start: 2025-02-10

## 2025-02-12 ENCOUNTER — CLINICAL SUPPORT (OUTPATIENT)
Dept: REHABILITATION | Facility: HOSPITAL | Age: 65
End: 2025-02-12
Attending: ORTHOPAEDIC SURGERY
Payer: COMMERCIAL

## 2025-02-12 ENCOUNTER — OFFICE VISIT (OUTPATIENT)
Dept: ORTHOPEDICS | Facility: CLINIC | Age: 65
End: 2025-02-12
Payer: COMMERCIAL

## 2025-02-12 VITALS — BODY MASS INDEX: 27.85 KG/M2 | WEIGHT: 163.13 LBS | HEIGHT: 64 IN

## 2025-02-12 DIAGNOSIS — Z96.651 STATUS POST RIGHT KNEE REPLACEMENT: Primary | ICD-10-CM

## 2025-02-12 DIAGNOSIS — E78.2 MIXED HYPERLIPIDEMIA: ICD-10-CM

## 2025-02-12 DIAGNOSIS — Z96.652 STATUS POST TOTAL LEFT KNEE REPLACEMENT: ICD-10-CM

## 2025-02-12 DIAGNOSIS — Z98.890 POSTOPERATIVE NAUSEA: ICD-10-CM

## 2025-02-12 DIAGNOSIS — R11.0 POSTOPERATIVE NAUSEA: ICD-10-CM

## 2025-02-12 DIAGNOSIS — M17.0 BILATERAL PRIMARY OSTEOARTHRITIS OF KNEE: Primary | ICD-10-CM

## 2025-02-12 PROCEDURE — 97140 MANUAL THERAPY 1/> REGIONS: CPT | Mod: PN

## 2025-02-12 PROCEDURE — 97110 THERAPEUTIC EXERCISES: CPT | Mod: PN

## 2025-02-12 PROCEDURE — 97530 THERAPEUTIC ACTIVITIES: CPT | Mod: PN

## 2025-02-12 PROCEDURE — 97112 NEUROMUSCULAR REEDUCATION: CPT | Mod: PN

## 2025-02-12 PROCEDURE — 97010 HOT OR COLD PACKS THERAPY: CPT | Mod: PN

## 2025-02-12 PROCEDURE — 99999 PR PBB SHADOW E&M-EST. PATIENT-LVL IV: CPT | Mod: PBBFAC,,, | Performed by: ORTHOPAEDIC SURGERY

## 2025-02-12 RX ORDER — ATORVASTATIN CALCIUM 20 MG/1
20 TABLET, FILM COATED ORAL
Qty: 90 TABLET | Refills: 0 | Status: SHIPPED | OUTPATIENT
Start: 2025-02-12 | End: 2025-02-21

## 2025-02-12 RX ORDER — HYDROMORPHONE HYDROCHLORIDE 2 MG/1
2 TABLET ORAL EVERY 8 HOURS PRN
Qty: 21 TABLET | Refills: 0 | Status: SHIPPED | OUTPATIENT
Start: 2025-02-12

## 2025-02-12 NOTE — PROGRESS NOTES
Outpatient Rehab    Physical Therapy Visit    Patient Name: Shirley Amaya  MRN: [id number]  YOB: 1960  Today's Date: 2/12/2025    Therapy Diagnosis:   Encounter Diagnosis   Name Primary?    Bilateral primary osteoarthritis of knee Yes     Physician: Aniceto Good MD    Physician Orders: Eval and Treat  Medical Diagnosis: primary osteoarthritis of bilateral knees     Visit # / Visits Authorized:  2 / 10   Date of Evaluation: 2/7/2025 reevaluation after surgery.   Insurance Authorization Period: 1/27/2025 to 12/31/2025  Plan of Care Certification:  2/7/2025 to 3/21/2025      Time In: 0830   Time Out: 1000  Total Time: 90   Total Billable Time: 75    FOTO:  Intake Score: 31%  Survey Score 1:  %  Survey Score 2:  %         Subjective   Patient reports some increased pain today compared to last session on Friday and she thinks some of her post surgical medication has worn off.  She does report she is taking her medication on the right schedule and doing her HEP.  She feels the most pain with staying still in either a bent knee position or when her legs are held straight..  Pain reported as 7/10. B knees.    Objective        Objective measures last taken on post-op visit 1 (2/7/2025)    Treatment:    Treatments performed today are in BOLD:      CPT Duration/ Details  2/12/2025 Duration/ Details  2/7/2025   TE Seated on foam: heel slides R and L skateboard 5 min each  Gastroc slant board 3 min  LAQ seated 10 reps each leg 3 sets assisted with strap  SAQ supine full roll 10 reps each leg 3 sets.  Quad sets supine (done seated today)  10 reps 3 sets R and L  Calf raise 3 x 10 B UE support.  ICE to B Knees with elevation and AP, QS, GS x 10 min Seated on foam: heel slides R and L skateboard 5 min each  Gastroc slant board 3 min  LAQ seated 10 reps each leg  SAQ supine full roll 10 reps each leg  Quad sets supine 10 reps 3 sets R and L  ICE to B Knees with elevation and AP, QS, GS x 10 min   TE Time 35  min 35 min   TA Nu Step (S=1 L=1) 10 min  Seated mini elliptical propped on 4 inch box 5 min working on functional knee motion.  Gait with RW working on step through pattern. Nu Step s=1 l=1 10 min  Gait with RW working on step through pattern.   TA Time 15 min 10 min   NMR Single balance 5 x 30 sed with UE PRN on bars  Steamboats 2x10    Single balance 5 x 30 sed with UE PRN on bars   NMR Time 10 min 5 min   MT PROM knee flexion and extension R and L PROM knee flexion and extension R and L   MT Time 15 min 15 min   PLAN Cont POC per protocol. Cont POC per protocol.       Assessment & Plan   Assessment: Patient had pain with legs elevated and ice today even with towel rolls under knees showing some increased difficulty with knee extension tolerance and had increased pain at her end ranges of flexion related to swelling and recent surgical incision.  Evaluation/Treatment Tolerance: Patient limited by pain    Patient will continue to benefit from skilled outpatient physical therapy to address the deficits listed in the problem list box on initial evaluation, provide pt/family education and to maximize pt's level of independence in the home and community environment.     Patient's spiritual, cultural, and educational needs considered and patient agreeable to plan of care and goals.           Plan: PT 2 x per week for 6 weeks post-op.    Goals:   Active       Ambulation/movement       Patient will ambulate with normal gait pattern with no device.       Start:  02/07/25    Expected End:  03/21/25               Functional outcome       Patient will show a significant change in FOTO patient-reported outcome tool to 70 to demonstrate subjective improvement       Start:  02/07/25    Expected End:  03/21/25            Patient stated goal: Patient would like to return to her prior level of function without knee pain.        Start:  02/07/25    Expected End:  03/21/25            Patient will demonstrate independence in home  program for support of progression       Start:  02/07/25    Expected End:  03/21/25               Pain       Patient will report pain of 3/10 demonstrating a reduction of overall pain       Start:  02/07/25    Expected End:  03/21/25            Patient will report a 2 point reduction in pain while performing ADL's.       Start:  02/07/25    Expected End:  02/28/25               Range of Motion       Patient will achieve bilateral knee ROM of  0-120 degrees        Start:  02/07/25    Expected End:  03/21/25               Strength       Patient will achieve bilateral knee flexion strength of 4+/5       Start:  02/07/25    Expected End:  03/21/25            Patient will achieve bilateral knee extension strength of 4+/5       Start:  02/07/25    Expected End:  03/21/25              Resolved       Physical Therapy  prehab education and HEP       Assess ROM, strength, function, gait and swelling post-op and set new goals base on post-op measures. (Met)       Start:  01/28/25    Expected End:  02/07/25    Resolved:  02/07/25             Thierno Neff, PT

## 2025-02-12 NOTE — TELEPHONE ENCOUNTER
Care Due:                  Date            Visit Type   Department     Provider  --------------------------------------------------------------------------------                                EP -                              PRIMARY      [facility]  [facility]  Last Visit: 02-      CARE ([facility])   MEDICINE       [place]  Next Visit: None Scheduled  None         None Found                                                            Last  Test          Frequency    Reason                     Performed    Due Date  --------------------------------------------------------------------------------    CMP.........  12 months..  atorvastatin.............  02- 02-    Lipid Panel.  12 months..  atorvastatin.............  02- 02-    [employer] Embedded Care Due Messages. Reference number: [id number].   2/12/2025 6:53:33 AM CST

## 2025-02-12 NOTE — TELEPHONE ENCOUNTER
Provider Staff:  Action required for this patient    Requires labs      Please see care gap opportunities below in Care Due Message.    Thanks!  [facility]     Appointments      Date Provider   Last Visit   2/19/2024 Gauri Naidu MD   Next Visit   Visit date not found Gauri Naidu MD     Refill Decision Note   Shirley Amaya  is requesting a refill authorization.  Brief Assessment and Rationale for Refill:  Approve     Medication Therapy Plan:         Comments:     Note composed:12:36 PM 02/12/2025

## 2025-02-12 NOTE — PROGRESS NOTES
Name: Shirley Amaya  MRN: [id number]  Date: 2/12/2025  Time: 1:59 PM    Reason for Visit: Postop nine days post bilateral knee replacement    Chief Complaint: Pain of the Left Knee and Pain of the Right Knee      HPI: Shirley Amaya is a 65 y.o. female.  Doing well overall today was her worst day with a 9/10 pain in the left knee with physical therapy today.  Left knee pain about 6/10.  Unable to tolerate oxycodone only took 1 tablet had severe nausea even with Zofran.  Relying only on tramadol and Tylenol.  Has not use muscle relaxer  On Eliquis day 9 DVT prophylaxis.    REVIEW OF SYSTEMS:  No chills, sweats, fevers shortness of breath.       Objective       PHYSICAL EXAMINATION:  Alert and oriented.  Well dressed and well groomed.    Juliet dressing removed    Incision site is benign and healing well bilateral knees without drainage.  No significant erythema or drainage at the incision left or right knee.  Mild left knee andmoderate right knee swelling   Mild to moderate bruising about the thigh knee and lower leg, right-greater-than-left.  Neurovascularly intact in the lower extremity.  Normal pulses and capillary refill.  Moderate peripheral edema lower extremity, right and left.  Calf is soft without palpable cords with a negative Homans, right and left  Good dorsiflexion/plantar flexion of the feet and toes.    Active flexion bilateral knees to 105+ degrees.  Both knees lack about 5 10° of full extension.    XRAY:  None today       Assessment and Plan         No follow-ups on file.                    Aniceto Good M.D.  Orthopedic Surgeon  [facility]

## 2025-02-12 NOTE — PATIENT INSTRUCTIONS
Encounter Diagnoses   Name Primary?    Status post right knee replacement Yes    Status post total left knee replacement     Postoperative nausea        ASSESSMENT:  Doing well overall 9 days postop for bilateral knee replacement.  Right knee higher level pain with a more swelling and bruising.  The patient not tolerant of oxycodone.  Did not tolerate hydrocodone in the past with profound nausea.    PLAN:  Dilaudid 2 mg 1 t.i.d. for high level pain. #21  Continue with Tylenol.  Use tramadol for lower level pain.  Refill tramadol earlier in the week.  Three more days on Eliquis.  Continue physical therapy.  Return to office in 4 weeks for postop checkup with a x-ray bilateral knees.

## 2025-02-14 ENCOUNTER — CLINICAL SUPPORT (OUTPATIENT)
Dept: REHABILITATION | Facility: HOSPITAL | Age: 65
End: 2025-02-14
Attending: ORTHOPAEDIC SURGERY
Payer: COMMERCIAL

## 2025-02-14 DIAGNOSIS — M17.0 BILATERAL PRIMARY OSTEOARTHRITIS OF KNEE: Primary | ICD-10-CM

## 2025-02-14 PROCEDURE — 97110 THERAPEUTIC EXERCISES: CPT | Mod: PN

## 2025-02-14 PROCEDURE — 97010 HOT OR COLD PACKS THERAPY: CPT | Mod: PN

## 2025-02-14 PROCEDURE — 97140 MANUAL THERAPY 1/> REGIONS: CPT | Mod: PN

## 2025-02-14 PROCEDURE — 97112 NEUROMUSCULAR REEDUCATION: CPT | Mod: PN

## 2025-02-14 PROCEDURE — 97530 THERAPEUTIC ACTIVITIES: CPT | Mod: PN

## 2025-02-17 NOTE — PROGRESS NOTES
Outpatient Rehab    Physical Therapy Visit    Patient Name: Shirley Amaya  MRN: [id number]  YOB: 1960  Today's Date: 2/17/2025    Therapy Diagnosis:   No diagnosis found.    Physician: Aniceto Good MD    Physician Orders: Eval and Treat  Medical Diagnosis: primary osteoarthritis of bilateral knees     Visit # / Visits Authorized:  2 / 10   Date of Evaluation: 2/7/2025 reevaluation after surgery.   Insurance Authorization Period: 1/27/2025 to 12/31/2025  Plan of Care Certification:  2/7/2025 to 3/21/2025      Time In:     Time Out:    Total Time:     Total Billable Time: 75       Subjective   Patient reports she feels better today and thinks she may have overdone it some at home resulting in some of her increased pain..  Pain reported as 5/10. B knees.    Objective        Objective measures last taken on post-op visit 1 (2/7/2025)    Treatment:    Treatments performed today are in BOLD:      CPT Duration/ Details  2/14/2025 Duration/ Details  2/12/2025   TE Seated on foam: heel slides R and L skateboard 5 min each  Gastroc slant board 3 min  LAQ seated 10 reps each leg 3 sets assisted with strap  SAQ supine full roll 10 reps each leg 3 sets.  Quad sets supine 10 reps 3 sets R and L  Calf raise 3 x 10 B UE support.  ICE to B Knees with elevation and AP, QS, GS x 10 min Seated on foam: heel slides R and L skateboard 5 min each  Gastroc slant board 3 min  LAQ seated 10 reps each leg 3 sets assisted with strap  SAQ supine full roll 10 reps each leg 3 sets.  Quad sets supine (done seated today)  10 reps 3 sets R and L  Calf raise 3 x 10 B UE support.  ICE to B Knees with elevation and AP, QS, GS x 10 min   TE Time 35 min 35 min   TA Nu Step (S=1 L=1) 10 min  Seated mini elliptical propped on 4 inch box 5 min working on functional knee motion.  Gait with RW working on step through pattern. Nu Step (S=1 L=1) 10 min  Seated mini elliptical propped on 4 inch box 5 min working on functional knee  motion.  Gait with RW working on step through pattern.   TA Time 15 min 15 min   NMR Single balance 5 x 30 sed with UE PRN on bars  Steamboats 2x10 Single balance 5 x 30 sed with UE PRN on bars  Steamboats 2x10      NMR Time 10 min 10 min   MT PROM knee flexion and extension R and L PROM knee flexion and extension R and L   MT Time 15 min 15 min   PLAN   Cont POC per protocol.       Assessment & Plan   Assessment: Patient tolerated session well with decreased pain after ice today versus the increase she had last session but more care was taken to avoid too much knee extension pressure during ice today.  Patient with some stiffness noted in knee extension B that will be adressed with future visits.       Patient will continue to benefit from skilled outpatient physical therapy to address the deficits listed in the problem list box on initial evaluation, provide pt/family education and to maximize pt's level of independence in the home and community environment.     Patient's spiritual, cultural, and educational needs considered and patient agreeable to plan of care and goals.           Plan: PT 2 x per week for 6 weeks post-op.    Goals:   Active       Ambulation/movement       Patient will ambulate with normal gait pattern with no device.       Start:  02/07/25    Expected End:  03/21/25               Functional outcome       Patient will show a significant change in FOTO patient-reported outcome tool to 70 to demonstrate subjective improvement       Start:  02/07/25    Expected End:  03/21/25            Patient stated goal: Patient would like to return to her prior level of function without knee pain.        Start:  02/07/25    Expected End:  03/21/25            Patient will demonstrate independence in home program for support of progression       Start:  02/07/25    Expected End:  03/21/25               Pain       Patient will report pain of 3/10 demonstrating a reduction of overall pain       Start:  02/07/25     Expected End:  03/21/25            Patient will report a 2 point reduction in pain while performing ADL's.       Start:  02/07/25    Expected End:  02/28/25               Range of Motion       Patient will achieve bilateral knee ROM of  0-120 degrees        Start:  02/07/25    Expected End:  03/21/25               Strength       Patient will achieve bilateral knee flexion strength of 4+/5       Start:  02/07/25    Expected End:  03/21/25            Patient will achieve bilateral knee extension strength of 4+/5       Start:  02/07/25    Expected End:  03/21/25              Resolved       Physical Therapy  prehab education and HEP       Assess ROM, strength, function, gait and swelling post-op and set new goals base on post-op measures. (Met)       Start:  01/28/25    Expected End:  02/07/25    Resolved:  02/07/25             Thierno Neff, PT

## 2025-02-19 ENCOUNTER — TELEPHONE (OUTPATIENT)
Dept: ORTHOPEDICS | Facility: CLINIC | Age: 65
End: 2025-02-19
Payer: COMMERCIAL

## 2025-02-19 ENCOUNTER — PATIENT MESSAGE (OUTPATIENT)
Dept: ORTHOPEDICS | Facility: CLINIC | Age: 65
End: 2025-02-19
Payer: COMMERCIAL

## 2025-02-19 ENCOUNTER — CLINICAL SUPPORT (OUTPATIENT)
Dept: REHABILITATION | Facility: HOSPITAL | Age: 65
End: 2025-02-19
Attending: ORTHOPAEDIC SURGERY
Payer: COMMERCIAL

## 2025-02-19 DIAGNOSIS — M17.0 BILATERAL PRIMARY OSTEOARTHRITIS OF KNEE: Primary | ICD-10-CM

## 2025-02-19 PROCEDURE — 97112 NEUROMUSCULAR REEDUCATION: CPT | Mod: PN

## 2025-02-19 PROCEDURE — 97140 MANUAL THERAPY 1/> REGIONS: CPT | Mod: PN

## 2025-02-19 PROCEDURE — 97010 HOT OR COLD PACKS THERAPY: CPT | Mod: PN

## 2025-02-19 PROCEDURE — 97110 THERAPEUTIC EXERCISES: CPT | Mod: PN

## 2025-02-19 PROCEDURE — 97530 THERAPEUTIC ACTIVITIES: CPT | Mod: PN

## 2025-02-19 NOTE — TELEPHONE ENCOUNTER
Spoke with patient she has been doing a lot of activity lately she had a long ride in the car and her knees are having some pain. She is taking the Tramadol and she did find the pain medication that Dr. Good gave her last did work. Advised patient that she can take the pain medication for breakthrough pain and that she may want to limit sitting for long periods for right now. She will call back if she has any issues.

## 2025-02-19 NOTE — PROGRESS NOTES
Outpatient Rehab    Physical Therapy Visit    Patient Name: Shirley Amaya  MRN: [id number]  YOB: 1960  Today's Date: 2/19/2025    Therapy Diagnosis:   Encounter Diagnosis   Name Primary?    Bilateral primary osteoarthritis of knee Yes       Physician: Aniceto Good MD    Physician Orders: Eval and Treat  Medical Diagnosis: primary osteoarthritis of bilateral knees     Visit # / Visits Authorized:  4 / 10   Date of Evaluation: 2/7/2025 reevaluation after surgery.   Insurance Authorization Period: 1/27/2025 to 12/31/2025  Plan of Care Certification:  2/7/2025 to 3/21/2025      Time In: 0830   Time Out: 0940  Total Time: 70   Total Billable Time: 75       Subjective   Patient reports some increased B knee pain today and yesterday.  She awoke yesterday feeling better but by the end of the day and today she has some increased pain..  Pain reported as 6/10. B knees.    Objective        Objective measures last taken on post-op visit 1 (2/7/2025)    Treatment:    Treatments performed today are in BOLD:      CPT Duration/ Details  2/14/2025 Duration/ Details  2/19/2025 Duration/ Details  2/12/2025   TE Seated on foam: heel slides R and L skateboard 5 min each  Gastroc slant board 3 min  LAQ seated 10 reps each leg 3 sets assisted with strap  SAQ supine full roll 10 reps each leg 3 sets.  Quad sets supine 10 reps 3 sets R and L  Calf raise 3 x 10 B UE support.  ICE to B Knees with elevation and AP, QS, GS x 10 min Seated on foam: heel slides R and L skateboard 5 min each  Gastroc slant board 3 min  LAQ seated 10 reps each leg 3 sets assisted with strap  SAQ supine full roll 10 reps each leg 3 sets.  Quad sets supine  10 reps 3 sets R and L  Calf raise 3 x 10 B UE support.  ICE to B Knees with elevation and AP, QS, GS x 10 min Seated on foam: heel slides R and L skateboard 5 min each  Gastroc slant board 3 min  LAQ seated 10 reps each leg 3 sets assisted with strap  SAQ supine full roll 10 reps each leg  3 sets.  Quad sets supine (done seated today)  10 reps 3 sets R and L  Calf raise 3 x 10 B UE support.  ICE to B Knees with elevation and AP, QS, GS x 10 min   TE Time 35 min 35 min 35 min   TA Nu Step (S=1 L=1) 10 min  Seated mini elliptical propped on 4 inch box 5 min working on functional knee motion.  Gait with RW working on step through pattern. Nu Step (S=1 L=1) 10 min  Seated mini elliptical propped on 4 inch box 5 min working on functional knee motion.  Gait with RW working on step through pattern. Nu Step (S=1 L=1) 10 min  Seated mini elliptical propped on 4 inch box 5 min working on functional knee motion.  Gait with RW working on step through pattern.   TA Time 15 min 15 min 15 min   NMR Single balance 5 x 30 sed with UE PRN on bars  Steamboats 2x10 Single balance 5 x 30 sed with UE PRN on bars  Steamboats 2x10 Single balance 5 x 30 sed with UE PRN on bars  Steamboats 2x10      NMR Time 10 min 10 min 10 min   MT PROM knee flexion and extension R and L PROM knee flexion and extension R and L PROM knee flexion and extension R and L   MT Time 15 min 15 min 15 min   PLAN   Cont POC per protocol. Cont POC per protocol.       Assessment & Plan   Assessment: Patient with some decreased knee pain at the end of the session with better knee extension PROM noted today during manual therapy.  She had some pain with ice and knee extension at about 8 min of ice that was relieved with lowering of legs and pillow put more under her knees for the last 2 min.  Evaluation/Treatment Tolerance: Patient limited by pain    Patient will continue to benefit from skilled outpatient physical therapy to address the deficits listed in the problem list box on initial evaluation, provide pt/family education and to maximize pt's level of independence in the home and community environment.     Patient's spiritual, cultural, and educational needs considered and patient agreeable to plan of care and goals.           Plan: PT 2 x per week  for 6 weeks post-op.    Goals:   Active       Ambulation/movement       Patient will ambulate with normal gait pattern with no device.       Start:  02/07/25    Expected End:  03/21/25               Functional outcome       Patient will show a significant change in FOTO patient-reported outcome tool to 70 to demonstrate subjective improvement       Start:  02/07/25    Expected End:  03/21/25            Patient stated goal: Patient would like to return to her prior level of function without knee pain.        Start:  02/07/25    Expected End:  03/21/25            Patient will demonstrate independence in home program for support of progression       Start:  02/07/25    Expected End:  03/21/25               Pain       Patient will report pain of 3/10 demonstrating a reduction of overall pain       Start:  02/07/25    Expected End:  03/21/25            Patient will report a 2 point reduction in pain while performing ADL's.       Start:  02/07/25    Expected End:  02/28/25               Range of Motion       Patient will achieve bilateral knee ROM of  0-120 degrees        Start:  02/07/25    Expected End:  03/21/25               Strength       Patient will achieve bilateral knee flexion strength of 4+/5       Start:  02/07/25    Expected End:  03/21/25            Patient will achieve bilateral knee extension strength of 4+/5       Start:  02/07/25    Expected End:  03/21/25              Resolved       Physical Therapy  prehab education and HEP       Assess ROM, strength, function, gait and swelling post-op and set new goals base on post-op measures. (Met)       Start:  01/28/25    Expected End:  02/07/25    Resolved:  02/07/25             Thierno Neff PT

## 2025-02-20 ENCOUNTER — PATIENT MESSAGE (OUTPATIENT)
Dept: ORTHOPEDICS | Facility: CLINIC | Age: 65
End: 2025-02-20
Payer: COMMERCIAL

## 2025-02-20 DIAGNOSIS — Z96.652 STATUS POST TOTAL LEFT KNEE REPLACEMENT: ICD-10-CM

## 2025-02-20 DIAGNOSIS — Z96.651 STATUS POST RIGHT KNEE REPLACEMENT: Primary | ICD-10-CM

## 2025-02-20 RX ORDER — TRAMADOL HYDROCHLORIDE 50 MG/1
50 TABLET ORAL EVERY 4 HOURS PRN
Qty: 56 TABLET | Refills: 0 | Status: SHIPPED | OUTPATIENT
Start: 2025-02-20

## 2025-02-21 ENCOUNTER — CLINICAL SUPPORT (OUTPATIENT)
Dept: REHABILITATION | Facility: HOSPITAL | Age: 65
End: 2025-02-21
Attending: ORTHOPAEDIC SURGERY
Payer: COMMERCIAL

## 2025-02-21 DIAGNOSIS — M17.0 BILATERAL PRIMARY OSTEOARTHRITIS OF KNEE: Primary | ICD-10-CM

## 2025-02-21 PROCEDURE — 97140 MANUAL THERAPY 1/> REGIONS: CPT | Mod: PN

## 2025-02-21 PROCEDURE — 97530 THERAPEUTIC ACTIVITIES: CPT | Mod: PN

## 2025-02-21 PROCEDURE — 97110 THERAPEUTIC EXERCISES: CPT | Mod: PN

## 2025-02-21 PROCEDURE — 97112 NEUROMUSCULAR REEDUCATION: CPT | Mod: PN

## 2025-02-21 PROCEDURE — 97010 HOT OR COLD PACKS THERAPY: CPT | Mod: PN

## 2025-02-21 RX ORDER — ATORVASTATIN CALCIUM 20 MG/1
20 TABLET, FILM COATED ORAL DAILY
COMMUNITY

## 2025-02-21 NOTE — TELEPHONE ENCOUNTER
Atorvastatin 20mg order discontinued due to cosign declined by TODD PORTILLO MD with comment stating Needs appointment. Entered patient-reported order on med list for placeholder.      Pended Medication(s)   Requested Prescriptions     Signed Prescriptions Disp Refills    atorvastatin (LIPITOR) 20 MG tablet 90 tablet 0     Sig: Take 1 tablet by mouth once daily     Authorizing Provider: TODD PORTILLO     Ordering User: MARIELENA MYERS

## 2025-02-21 NOTE — PROGRESS NOTES
Outpatient Rehab    Physical Therapy Visit    Patient Name: Shirley Amaya  MRN: [id number]  YOB: 1960  Today's Date: 2/21/2025    Therapy Diagnosis:   Encounter Diagnosis   Name Primary?    Bilateral primary osteoarthritis of knee Yes       Physician: Aniceto Good MD    Physician Orders: Eval and Treat  Medical Diagnosis: primary osteoarthritis of bilateral knees     Visit # / Visits Authorized:  5 / 10   Date of Evaluation: 2/7/2025 reevaluation after surgery.   Insurance Authorization Period: 1/27/2025 to 12/31/2025  Plan of Care Certification:  2/7/2025 to 3/21/2025      Time In: 0930   Time Out: 1040  Total Time: 70   Total Billable Time: 70       Subjective   patient reports increased pain in B knees to 8/10 today related to walking more at grocery store walking to pharmacy and back to car.  She ran out of pain medication as well and she has contacted her doctor about the medication refill..  Pain reported as 8/10. B knees.    Objective        Objective measures last taken on post-op visit 1 (2/7/2025)    Treatment:    Treatments performed today are in BOLD:      CPT Duration/ Details  2/21/2025 Duration/ Details  2/14/2025   TE Seated in chair: heel slides R and L skateboard 5 min each  Gastroc slant board 3 min  LAQ seated on foam in chair assisted with strap (on freemotion machine today) 7 lbs 5 min each leg  SAQ supine full roll 10 reps each leg 3 sets.  Quad sets supine 10 reps 3 sets R and L  Calf raise 3 x 10 B UE support.  ICE to B Knees with elevation and AP, QS, GS x 10 min Seated on foam: heel slides R and L skateboard 5 min each  Gastroc slant board 3 min  LAQ seated 10 reps each leg 3 sets assisted with strap  SAQ supine full roll 10 reps each leg 3 sets.  Quad sets supine 10 reps 3 sets R and L  Calf raise 3 x 10 B UE support.  ICE to B Knees with elevation and AP, QS, GS x 10 min   TE Time 35 min 35 min   TA Nu Step (S=1 L=1) 10 min  Seated mini elliptical propped on 4  inch box 5 min working on functional knee motion.  Gait with SBQC on right working on step through pattern.  Leg Press B 11 lbs 3 x 10 reps. Nu Step (S=1 L=1) 10 min  Seated mini elliptical propped on 4 inch box 5 min working on functional knee motion.  Gait with RW working on step through pattern.   TA Time 20 min 15 min   NMR Single balance 5 x 30 sed with UE PRN on bars  Steamboats 2x10 Single balance 5 x 30 sed with UE PRN on bars  Steamboats 2x10   NMR Time 10 min 10 min   MT PROM knee flexion and extension R and L PROM knee flexion and extension R and L   MT Time 15 min 15 min   PLAN  Cont with POC         Assessment & Plan   Assessment: Added AAROM for knee extension motion to program and along with the rest of her therapy session we were able to reduce her pain to 0/10 post session.  She has less swelling and better knee AROM along with now walking with SBQC today on right.  Evaluation/Treatment Tolerance: Patient tolerated treatment well    Patient will continue to benefit from skilled outpatient physical therapy to address the deficits listed in the problem list box on initial evaluation, provide pt/family education and to maximize pt's level of independence in the home and community environment.     Patient's spiritual, cultural, and educational needs considered and patient agreeable to plan of care and goals.     Education  Education was done with Patient. The patient's learning style includes Demonstration and Listening. The patient Demonstrates understanding and Verbalizes understanding.         Education on new AAROM exercise in therapy and walking with SBQC.       Plan: PT 2 x per week for 6 weeks post-op.    Goals:   Active       Ambulation/movement       Patient will ambulate with normal gait pattern with no device.       Start:  02/07/25    Expected End:  03/21/25               Functional outcome       Patient will show a significant change in FOTO patient-reported outcome tool to 70 to  demonstrate subjective improvement       Start:  02/07/25    Expected End:  03/21/25            Patient stated goal: Patient would like to return to her prior level of function without knee pain.        Start:  02/07/25    Expected End:  03/21/25            Patient will demonstrate independence in home program for support of progression       Start:  02/07/25    Expected End:  03/21/25               Pain       Patient will report pain of 3/10 demonstrating a reduction of overall pain       Start:  02/07/25    Expected End:  03/21/25            Patient will report a 2 point reduction in pain while performing ADL's.       Start:  02/07/25    Expected End:  02/28/25               Range of Motion       Patient will achieve bilateral knee ROM of  0-120 degrees        Start:  02/07/25    Expected End:  03/21/25               Strength       Patient will achieve bilateral knee flexion strength of 4+/5       Start:  02/07/25    Expected End:  03/21/25            Patient will achieve bilateral knee extension strength of 4+/5       Start:  02/07/25    Expected End:  03/21/25              Resolved       Physical Therapy  prehab education and HEP       Assess ROM, strength, function, gait and swelling post-op and set new goals base on post-op measures. (Met)       Start:  01/28/25    Expected End:  02/07/25    Resolved:  02/07/25             Thierno Neff PT

## 2025-02-25 ENCOUNTER — CLINICAL SUPPORT (OUTPATIENT)
Dept: REHABILITATION | Facility: HOSPITAL | Age: 65
End: 2025-02-25
Attending: ORTHOPAEDIC SURGERY
Payer: COMMERCIAL

## 2025-02-25 DIAGNOSIS — M17.0 BILATERAL PRIMARY OSTEOARTHRITIS OF KNEE: Primary | ICD-10-CM

## 2025-02-25 PROCEDURE — 97140 MANUAL THERAPY 1/> REGIONS: CPT | Mod: PN

## 2025-02-25 PROCEDURE — 97010 HOT OR COLD PACKS THERAPY: CPT | Mod: PN

## 2025-02-25 PROCEDURE — 97110 THERAPEUTIC EXERCISES: CPT | Mod: PN

## 2025-02-25 PROCEDURE — 97112 NEUROMUSCULAR REEDUCATION: CPT | Mod: PN

## 2025-02-25 PROCEDURE — 97530 THERAPEUTIC ACTIVITIES: CPT | Mod: PN

## 2025-02-26 NOTE — PROGRESS NOTES
Outpatient Rehab    Physical Therapy Visit    Patient Name: Shirley Amaya  MRN: [id number]  YOB: 1960  Today's Date: 2/25/2025    Therapy Diagnosis:   Encounter Diagnosis   Name Primary?    Bilateral primary osteoarthritis of knee Yes       Physician: Aniceto Good MD    Physician Orders: Eval and Treat  Medical Diagnosis: primary osteoarthritis of bilateral knees     Visit # / Visits Authorized:  5 / 10   Date of Evaluation: 2/7/2025 reevaluation after surgery.   Insurance Authorization Period: 1/27/2025 to 12/31/2025  Plan of Care Certification:  2/7/2025 to 3/21/2025      Time In: 0830   Time Out: 0945  Total Time: 75   Total Billable Time: 70       Subjective   Patient reports she feels much better after last session and no current knee pain for the first time since surgery..  Pain reported as 0/10. B knees.    Objective        Objective measures last taken on post-op visit 1 (2/7/2025)    Treatment:    Treatments performed today are in BOLD:      CPT Duration/ Details  2/25/2025 Duration/ Details  2/21/2025   TE Seated in chair: heel slides R and L skateboard 5 min each  Gastroc slant board 3 min  LAQ seated on foam in chair assisted with strap (on freemotion machine today) 7 lbs 5 min each leg  SAQ supine full roll 10 reps each leg 3 sets.  Quad sets supine 10 reps 3 sets R and L  Calf raise 3 x 10 B UE support.  ICE to B Knees with elevation and AP, QS, GS x 10 min Seated in chair: heel slides R and L skateboard 5 min each  Gastroc slant board 3 min  LAQ seated on foam in chair assisted with strap (on freemotion machine today) 7 lbs 5 min each leg  SAQ supine full roll 10 reps each leg 3 sets.  Quad sets supine 10 reps 3 sets R and L  Calf raise 3 x 10 B UE support.  ICE to B Knees with elevation and AP, QS, GS x 10 min   TE Time 30 min 35 min   TA Nu Step (S=1 L=1) 10 min  Seated mini elliptical propped on 4 inch box 5 min working on functional knee motion.  Bike S=5 L=10 able to do  full revolutions x 10 min  Leg Press B 22 lbs 3 x 10 reps. Nu Step (S=1 L=1) 10 min  Seated mini elliptical propped on 4 inch box 5 min working on functional knee motion.  Gait with SBQC on right working on step through pattern.  Leg Press B 11 lbs 3 x 10 reps.   TA Time 30 min 20 min   NMR Single balance 5 x 30 sec with UE PRN on bars  Steamboats 2x10 Single balance 5 x 30 sed with UE PRN on bars  Steamboats 2x10   NMR Time 10 min 10 min   MT PROM knee flexion and extension R and L PROM knee flexion and extension R and L   MT Time 10 min 15 min   PLAN Cont with POC          Assessment & Plan   Assessment: Patient with some pain noted with knee extension PROM but otherwise doing well with less pain and better knee ROM.  She comes today walking without an AD.  Evaluation/Treatment Tolerance: Patient tolerated treatment well    Patient will continue to benefit from skilled outpatient physical therapy to address the deficits listed in the problem list box on initial evaluation, provide pt/family education and to maximize pt's level of independence in the home and community environment.     Patient's spiritual, cultural, and educational needs considered and patient agreeable to plan of care and goals.             Plan: PT 2 x per week for 6 weeks post-op.    Goals:   Active       Ambulation/movement       Patient will ambulate with normal gait pattern with no device.       Start:  02/07/25    Expected End:  03/21/25               Functional outcome       Patient will show a significant change in FOTO patient-reported outcome tool to 70 to demonstrate subjective improvement       Start:  02/07/25    Expected End:  03/21/25            Patient stated goal: Patient would like to return to her prior level of function without knee pain.        Start:  02/07/25    Expected End:  03/21/25            Patient will demonstrate independence in home program for support of progression       Start:  02/07/25    Expected End:  03/21/25                Pain       Patient will report pain of 3/10 demonstrating a reduction of overall pain       Start:  02/07/25    Expected End:  03/21/25            Patient will report a 2 point reduction in pain while performing ADL's.       Start:  02/07/25    Expected End:  02/28/25               Range of Motion       Patient will achieve bilateral knee ROM of  0-120 degrees        Start:  02/07/25    Expected End:  03/21/25               Strength       Patient will achieve bilateral knee flexion strength of 4+/5       Start:  02/07/25    Expected End:  03/21/25            Patient will achieve bilateral knee extension strength of 4+/5       Start:  02/07/25    Expected End:  03/21/25              Resolved       Physical Therapy  prehab education and HEP       Assess ROM, strength, function, gait and swelling post-op and set new goals base on post-op measures. (Met)       Start:  01/28/25    Expected End:  02/07/25    Resolved:  02/07/25             Thierno Neff, PT

## 2025-02-28 ENCOUNTER — CLINICAL SUPPORT (OUTPATIENT)
Dept: REHABILITATION | Facility: HOSPITAL | Age: 65
End: 2025-02-28
Attending: ORTHOPAEDIC SURGERY
Payer: COMMERCIAL

## 2025-02-28 DIAGNOSIS — M17.0 BILATERAL PRIMARY OSTEOARTHRITIS OF KNEE: Primary | ICD-10-CM

## 2025-02-28 PROCEDURE — 97112 NEUROMUSCULAR REEDUCATION: CPT | Mod: PN

## 2025-02-28 PROCEDURE — 97110 THERAPEUTIC EXERCISES: CPT | Mod: PN

## 2025-02-28 PROCEDURE — 97010 HOT OR COLD PACKS THERAPY: CPT | Mod: PN

## 2025-02-28 PROCEDURE — 97140 MANUAL THERAPY 1/> REGIONS: CPT | Mod: PN

## 2025-02-28 PROCEDURE — 97530 THERAPEUTIC ACTIVITIES: CPT | Mod: PN

## 2025-02-28 NOTE — PROGRESS NOTES
Outpatient Rehab    Physical Therapy Progress Note    Patient Name: Shirley Amaya  MRN: [id number]  YOB: 1960  Encounter Date: 2/28/2025    Therapy Diagnosis: No diagnosis found.  Physician: Aniceto Good MD    Physician Orders: Eval and Treat  Medical Diagnosis: primary osteoarthritis of bilateral knees     Visit # / Visits Authorized:  7 / 10   Date of Evaluation: 2/7/2025 reevaluation after surgery.   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  Pain reported as 2/10. B knees.    Objective       Knee Range of Motion   Right Knee   Active (deg) Passive (deg) Pain   Flexion 120       Extension 0           Left Knee   Active (deg) Passive (deg) Pain   Flexion 115       Extension 0                          Hip Strength - Planes of Motion   Right Strength Right Pain Left Strength Left  Pain   Flexion (L2) 4   4     Extension           ABduction           ADduction           Internal Rotation           External Rotation               Knee Strength   Right Strength Right Pain Left Strength Left  Pain   Flexion (S2) 4-   4-     Prone Flexion           Extension (L3) 3+   3+                  Treatment:    Treatments performed today are in BOLD:    CPT Duration/ Details  2/28/2025 Duration/ Details  2/25/2025   TE Seated in chair: heel slides R and L skateboard 5 min each  Gastroc slant board 3 min  LAQ seated on foam in chair assisted with strap (on freemotion machine today) 7 lbs 5 min each leg  SAQ supine full roll 10 reps each leg 3 sets.  Quad sets supine 10 reps 3 sets R and L  Calf raise 3 x 10 B UE support.  ICE to B Knees with elevation and AP, QS, GS x 10 min Seated in chair: heel slides R  and L skateboard 5 min each  Gastroc slant board 3 min  LAQ seated on foam in chair assisted with strap (on freemotion machine today) 7 lbs 5 min each leg  SAQ supine full roll 10 reps each leg 3 sets.  Quad sets supine 10 reps 3 sets R and L  Calf raise 3 x 10 B UE support.  ICE to B Knees with elevation and AP, QS, GS x 10 min   TE Time 25 min 25 min   TA Nu Step (S=1 L=4) 10 min  Seated mini elliptical propped on 4 inch box 5 min working on functional knee motion.  Bike S=5 L=10 able to do full revolutions x 10 min  Leg Press B 22 lbs 3 x 10 reps. Nu Step (S=1 L=1) 10 min  Seated mini elliptical propped on 4 inch box 5 min working on functional knee motion.  Bike S=5 L=10 able to do full revolutions x 10 min  Leg Press B 22 lbs 3 x 10 reps.   TA Time 30 min 30 min   NMR Single balance on foam 5 x 30 sed with UE PRN on bars  Steamboats on foam 2x10 Single balance 5 x 30 sed with UE PRN on bars  Steamboats 2x10   NMR Time 10 min 10 min   MT PROM knee flexion and extension R and L PROM knee flexion and extension R and L   MT Time 15 min 15 min   PLAN Cont with POC per protocol         Assessment & Plan   Assessment: Patient with improved knee AROM B and better knee strength with improved gait without AD today.  She continues with some ROM limits and strength deficits as well as swelling in B LE and limited function.  Evaluation/Treatment Tolerance: Patient tolerated treatment well    Patient will continue to benefit from skilled outpatient physical therapy to address the deficits listed in the problem list box on initial evaluation, provide pt/family education and to maximize pt's level of independence in the home and community environment.     Patient's spiritual, cultural, and educational needs considered and patient agreeable to plan of care and goals.           Plan: PT 2 x per week for 6 weeks post-op.    Goals:   Active       Ambulation/movement       Patient will ambulate with normal gait pattern with no  device. (Progressing)       Start:  02/07/25    Expected End:  03/21/25               Functional outcome       Patient will show a significant change in FOTO patient-reported outcome tool to 70 to demonstrate subjective improvement (Progressing)       Start:  02/07/25    Expected End:  03/21/25            Patient stated goal: Patient would like to return to her prior level of function without knee pain.  (Progressing)       Start:  02/07/25    Expected End:  03/21/25            Patient will demonstrate independence in home program for support of progression (Progressing)       Start:  02/07/25    Expected End:  03/21/25               Pain       Patient will report pain of 3/10 demonstrating a reduction of overall pain (Progressing)       Start:  02/07/25    Expected End:  03/21/25            Patient will report a 2 point reduction in pain while performing ADL's. (Met)       Start:  02/07/25    Expected End:  02/28/25    Resolved:  02/28/25            Range of Motion       Patient will achieve bilateral knee ROM of  0-120 degrees  (Progressing)       Start:  02/07/25    Expected End:  03/21/25               Strength       Patient will achieve bilateral knee flexion strength of 4+/5 (Progressing)       Start:  02/07/25    Expected End:  03/21/25            Patient will achieve bilateral knee extension strength of 4+/5 (Progressing)       Start:  02/07/25    Expected End:  03/21/25              Resolved       Physical Therapy  prehab education and HEP       Assess ROM, strength, function, gait and swelling post-op and set new goals base on post-op measures. (Met)       Start:  01/28/25    Expected End:  02/07/25    Resolved:  02/07/25             Thierno Neff, PT

## 2025-03-04 ENCOUNTER — CLINICAL SUPPORT (OUTPATIENT)
Dept: REHABILITATION | Facility: HOSPITAL | Age: 65
End: 2025-03-04
Attending: ORTHOPAEDIC SURGERY
Payer: COMMERCIAL

## 2025-03-04 DIAGNOSIS — M17.0 BILATERAL PRIMARY OSTEOARTHRITIS OF KNEE: Primary | ICD-10-CM

## 2025-03-04 PROCEDURE — 97530 THERAPEUTIC ACTIVITIES: CPT | Mod: PN

## 2025-03-04 PROCEDURE — 97110 THERAPEUTIC EXERCISES: CPT | Mod: PN

## 2025-03-04 PROCEDURE — 97112 NEUROMUSCULAR REEDUCATION: CPT | Mod: PN

## 2025-03-04 PROCEDURE — 97140 MANUAL THERAPY 1/> REGIONS: CPT | Mod: PN

## 2025-03-04 PROCEDURE — 97010 HOT OR COLD PACKS THERAPY: CPT | Mod: PN

## 2025-03-04 NOTE — PROGRESS NOTES
Outpatient Rehab    Physical Therapy Visit    Patient Name: Shirley Amaya  MRN: [id number]  YOB: 1960  Today's Date: 3/4/2025    Therapy Diagnosis:   Encounter Diagnosis   Name Primary?    Bilateral primary osteoarthritis of knee Yes       Physician: Aniceto Good MD    Physician Orders: Eval and Treat  Medical Diagnosis: primary osteoarthritis of bilateral knees     Visit # / Visits Authorized:  8 / 10   Date of Evaluation: 2/7/2025 reevaluation after surgery.   Insurance Authorization Period: 1/27/2025 to 12/31/2025  Plan of Care Certification:  2/7/2025 to 3/21/2025      Time In: 0735   Time Out: 0850  Total Time: 75   Total Billable Time: 70       Subjective   Pateint reports she feels stiffness in the mornings but otherwise feels her knees are doing well.  She is able to walk without any AD and feels knees improve with movement..  Pain reported as 2/10. B knees.    Objective        Objective measures last taken on 2/28/2025 visit 8    Treatment:    Treatments performed today are in BOLD:      CPT Duration/ Details  3/4/2025 Duration/ Details  2/28/2025   TE Seated in chair: heel slides R and L skateboard 5 min each  Gastroc slant board 3 min  LAQ seated on foam in chair assisted with strap (on freemotion machine today) 7 lbs 5 min each leg  SAQ supine full roll 10 reps each leg 3 sets.  Quad sets supine 10 reps 3 sets R and L  Calf raise 3 x 10 B UE support.  ICE to B Knees with elevation and AP, QS, GS x 10 min Seated in chair: heel slides R and L skateboard 5 min each  Gastroc slant board 3 min  LAQ seated on foam in chair assisted with strap (on freemotion machine today) 7 lbs 5 min each leg  SAQ supine full roll 10 reps each leg 3 sets.  Quad sets supine 10 reps 3 sets R and L  Calf raise 3 x 10 B UE support.  ICE to B Knees with elevation and AP, QS, GS x 10 min   TE Time 25 min 25 min   TA Nu Step (S=1 L=4) 10 min  Seated mini elliptical propped on 4 inch box 5 min working on  functional knee motion.  Bike S=5 L=10 able to do full revolutions x 10 min  Leg Press B 22 lbs 3 x 10 reps. Nu Step (S=1 L=4) 10 min  Seated mini elliptical propped on 4 inch box 5 min working on functional knee motion.  Bike S=5 L=10 able to do full revolutions x 10 min  Leg Press B 22 lbs 3 x 10 reps.   TA Time 30 min 30 min   NMR Single balance on foam 5 x 30 sed with UE PRN on bars  Steamboats on foam 3x10 Single balance on foam 5 x 30 sed with UE PRN on bars  Steamboats on foam 2x10   NMR Time 10 min 10 min   MT PROM knee flexion and extension R and L PROM knee flexion and extension R and L   MT Time 15 min 15 min   PLAN Cont with POC per protocol Cont with POC per protocol       Assessment & Plan   Assessment: Patient doing better today and able to prevent herself from overdoing more at home which helped her pain.  She still has tightness in knee extension and at her end ranges of flexion but improved tolerance of knee flexion noted with exercises and manual therapy.  Evaluation/Treatment Tolerance: Patient tolerated treatment well    Patient will continue to benefit from skilled outpatient physical therapy to address the deficits listed in the problem list box on initial evaluation, provide pt/family education and to maximize pt's level of independence in the home and community environment.     Patient's spiritual, cultural, and educational needs considered and patient agreeable to plan of care and goals.             Plan: PT 2 x per week for 6 weeks post-op.    Goals:   Active       Ambulation/movement       Patient will ambulate with normal gait pattern with no device. (Progressing)       Start:  02/07/25    Expected End:  03/21/25               Functional outcome       Patient will show a significant change in FOTO patient-reported outcome tool to 70 to demonstrate subjective improvement (Progressing)       Start:  02/07/25    Expected End:  03/21/25            Patient stated goal: Patient would like to  return to her prior level of function without knee pain.  (Progressing)       Start:  02/07/25    Expected End:  03/21/25            Patient will demonstrate independence in home program for support of progression (Progressing)       Start:  02/07/25    Expected End:  03/21/25               Pain       Patient will report pain of 3/10 demonstrating a reduction of overall pain (Progressing)       Start:  02/07/25    Expected End:  03/21/25            Patient will report a 2 point reduction in pain while performing ADL's. (Met)       Start:  02/07/25    Expected End:  02/28/25    Resolved:  02/28/25            Range of Motion       Patient will achieve bilateral knee ROM of  0-120 degrees  (Progressing)       Start:  02/07/25    Expected End:  03/21/25               Strength       Patient will achieve bilateral knee flexion strength of 4+/5 (Progressing)       Start:  02/07/25    Expected End:  03/21/25            Patient will achieve bilateral knee extension strength of 4+/5 (Progressing)       Start:  02/07/25    Expected End:  03/21/25              Resolved       Physical Therapy  prehab education and HEP       Assess ROM, strength, function, gait and swelling post-op and set new goals base on post-op measures. (Met)       Start:  01/28/25    Expected End:  02/07/25    Resolved:  02/07/25             Thierno Neff PT

## 2025-03-05 ENCOUNTER — OFFICE VISIT (OUTPATIENT)
Dept: ORTHOPEDICS | Facility: CLINIC | Age: 65
End: 2025-03-05
Payer: COMMERCIAL

## 2025-03-05 ENCOUNTER — HOSPITAL ENCOUNTER (OUTPATIENT)
Dept: RADIOLOGY | Facility: HOSPITAL | Age: 65
Discharge: HOME | End: 2025-03-05
Attending: ORTHOPAEDIC SURGERY
Payer: COMMERCIAL

## 2025-03-05 VITALS — WEIGHT: 61.75 LBS | BODY MASS INDEX: 10.54 KG/M2 | HEIGHT: 64 IN

## 2025-03-05 DIAGNOSIS — Z96.652 STATUS POST TOTAL LEFT KNEE REPLACEMENT: ICD-10-CM

## 2025-03-05 DIAGNOSIS — Z96.651 STATUS POST RIGHT KNEE REPLACEMENT: ICD-10-CM

## 2025-03-05 DIAGNOSIS — Z96.651 STATUS POST RIGHT KNEE REPLACEMENT: Primary | ICD-10-CM

## 2025-03-05 PROCEDURE — 3008F BODY MASS INDEX DOCD: CPT | Mod: CPTII,S$GLB,, | Performed by: ORTHOPAEDIC SURGERY

## 2025-03-05 PROCEDURE — 73562 X-RAY EXAM OF KNEE 3: CPT | Mod: 26,50,, | Performed by: RADIOLOGY

## 2025-03-05 PROCEDURE — 3044F HG A1C LEVEL LT 7.0%: CPT | Mod: CPTII,S$GLB,, | Performed by: ORTHOPAEDIC SURGERY

## 2025-03-05 PROCEDURE — 73562 X-RAY EXAM OF KNEE 3: CPT | Mod: TC,50

## 2025-03-05 PROCEDURE — 1160F RVW MEDS BY RX/DR IN RCRD: CPT | Mod: CPTII,S$GLB,, | Performed by: ORTHOPAEDIC SURGERY

## 2025-03-05 PROCEDURE — 99999 PR PBB SHADOW E&M-EST. PATIENT-LVL III: CPT | Mod: PBBFAC,,, | Performed by: ORTHOPAEDIC SURGERY

## 2025-03-05 PROCEDURE — 1159F MED LIST DOCD IN RCRD: CPT | Mod: CPTII,S$GLB,, | Performed by: ORTHOPAEDIC SURGERY

## 2025-03-05 PROCEDURE — 99024 POSTOP FOLLOW-UP VISIT: CPT | Mod: S$GLB,,, | Performed by: ORTHOPAEDIC SURGERY

## 2025-03-05 RX ORDER — TRAMADOL HYDROCHLORIDE 50 MG/1
50 TABLET ORAL EVERY 6 HOURS PRN
Qty: 28 TABLET | Refills: 0 | Status: SHIPPED | OUTPATIENT
Start: 2025-03-05 | End: 2025-03-12

## 2025-03-05 NOTE — PROGRESS NOTES
Aniceto Good MD  Orthopedic Surgeon   [address] [facility] [place] [address]     VISIT DATE: 3/5/2025       Name: Shirley Amaya  MRN: [id number]    PCP: Gauri Naidu MD  Insurance: Payor: [employer] / Plan: [employer] OPEN ACCESS PLUS / Product Type: Commercial /   Residence: [place]  Post Op Global Billing: Yes    Reason for Visit: TKR Postop visit 2 - 5 weeks    Patient Instructions     Encounter Diagnoses   Name Primary?    Status post right knee replacement Yes    Status post total left knee replacement        ASSESSMENT:  Five weeks status post bilateral total knee replacement.  She had significant postop nausea.  Did not tolerate oxycodone.  Switched pain medicine to Dilaudid  Off all pain meds now using Tylenol and Advil.    TREATMENT/PLAN:  Stop PT after next week  Continue tylenol and Ibuprofen  RTO 6 months post op  Prescription sent for tramadol 50 mg to take 1 up to 4 times a day on days she is aching 28 tablets prescribed.    X-rays:  [facility] today  Dougherty and nephew bilateral total knees cemented posterior cruciate substituting.  Anatomic sizing of femoral and tibial component bilateral knees.  Patella inset resurfacing bilateral knees with normal patellar height.  Cement mantle all 3 components of right and left knee is excellent.  Polyethylene space is balanced on both knees.    HPI: Shirley Amaya is a 65 y.o. female. who returns to the office today, 5 weeks status post TKR.    REVIEW: Post-op Symptoms/Complaints:  None    PHYSICAL EXAMINATION:  Alert and oriented.  Well dressed and well groomed.  Ambulating with No Assistive Device    Incision site:  Incision site is benign and well healed, bilateral knees.  No significant erythema or drainage at the incision.    ROM:  Near 0° to 115° plus bilateral knees.  Patella tracking centrally both knees.  Knee stable at extension, mid flexion and deep flexion, right and left    No swelling about the knees  No   bruising about the thigh knee and lower leg  Neurovascularly intact in the lower extremity.  Normal pulses and capillary refill.  No peripheral edema lower extremity.    Calf is soft without palpable cords with a negative Homans.  Good dorsiflexion/plantar flexion of the feet and toes.    15 minute patient encounter, postop global.  This includes face to face time and non-face to face time preparing to see the patient (eg, review of tests),   obtaining and/or reviewing separately obtained history, documenting clinical information in the electronic or other health record, independently interpreting results   and communicating results to the patient/family/caregiver, or care coordinator.       DISCLAIMER: This note was prepared with e[employer] voice recognition transcription software. Garbled syntax, mangled pronouns, and other bizarre constructions may be attributed to that software system.           Aniceto Good M.D.  Orthopedic Surgeon  [facility] [place]

## 2025-03-05 NOTE — PATIENT INSTRUCTIONS
Encounter Diagnoses   Name Primary?    Status post right knee replacement Yes    Status post total left knee replacement        ASSESSMENT:  Five weeks status post bilateral total knee replacement.  She had significant postop nausea.  Did not tolerate oxycodone.  Switched pain medicine to Dilaudid  Off all pain meds now using Tylenol and Advil.    TREATMENT/PLAN:  Stop PT after next week  Continue tylenol and Ibuprofen  RTO 6 months post op  Prescription sent for tramadol 50 mg to take 1 up to 4 times a day on days she is aching 28 tablets prescribed.

## 2025-03-07 ENCOUNTER — CLINICAL SUPPORT (OUTPATIENT)
Dept: REHABILITATION | Facility: HOSPITAL | Age: 65
End: 2025-03-07
Attending: ORTHOPAEDIC SURGERY
Payer: COMMERCIAL

## 2025-03-07 DIAGNOSIS — M17.0 BILATERAL PRIMARY OSTEOARTHRITIS OF KNEE: Primary | ICD-10-CM

## 2025-03-07 PROCEDURE — 97010 HOT OR COLD PACKS THERAPY: CPT | Mod: PN

## 2025-03-07 PROCEDURE — 97112 NEUROMUSCULAR REEDUCATION: CPT | Mod: PN

## 2025-03-07 PROCEDURE — 97110 THERAPEUTIC EXERCISES: CPT | Mod: PN

## 2025-03-07 PROCEDURE — 97530 THERAPEUTIC ACTIVITIES: CPT | Mod: PN

## 2025-03-10 NOTE — PROGRESS NOTES
Outpatient Rehab    Physical Therapy Visit    Patient Name: Shirley Amaya  MRN: [id number]  YOB: 1960  Today's Date: 3/10/2025    Therapy Diagnosis:   Encounter Diagnosis   Name Primary?    Bilateral primary osteoarthritis of knee Yes       Physician: Aniceto Good MD    Physician Orders: Eval and Treat  Medical Diagnosis: primary osteoarthritis of bilateral knees     Visit # / Visits Authorized:  9 / 10   Date of Evaluation: 2/7/2025 reevaluation after surgery.   Insurance Authorization Period: 1/27/2025 to 12/31/2025  Plan of Care Certification:  2/7/2025 to 3/21/2025      Time In: 0745   Time Out: 0900  Total Time: 75   Total Billable Time: 75       Subjective   Patient reports she feels great with no knee pain and doctor was very pleased with her progress stating she is more like someone 12 weeks out from surgery versus only about 4-5 weeks out..  Pain reported as 0/10. B knees.    Objective        Objective measures last taken on 2/28/2025 visit 8    Treatment:    Treatments performed today are in BOLD:      CPT Duration/ Details  3/7/2025 Duration/ Details  3/4/2025   TE Seated in chair: heel slides R and L skateboard 5 min each  Gastroc slant board 3 min  LAQ seated on foam in chair assisted with strap (on freemotion machine today) 7 lbs 5 min each leg  SAQ supine full roll 10 reps each leg 3 sets.  Quad sets supine 10 reps 3 sets R and L  Calf raise 3 x 10 B UE support.  ICE to B Knees with elevation and AP, QS, GS x 10 min Seated in chair: heel slides R and L skateboard 5 min each  Gastroc slant board 3 min  LAQ seated on foam in chair assisted with strap (on freemotion machine today) 7 lbs 5 min each leg  SAQ supine full roll 10 reps each leg 3 sets.  Quad sets supine 10 reps 3 sets R and L  Calf raise 3 x 10 B UE support.  ICE to B Knees with elevation and AP, QS, GS x 10 min   TE Time 25 min 25 min   TA Nu Step (S=1 L=4) 10 min  Seated mini elliptical propped on 4 inch box 5 min  working on functional knee motion.  Bike S=5 L=10 able to do full revolutions x 10 min  Leg Press B 33 lbs 3 x 10 reps. Nu Step (S=1 L=4) 10 min  Seated mini elliptical propped on 4 inch box 5 min working on functional knee motion.  Bike S=5 L=10 able to do full revolutions x 10 min  Leg Press B 22 lbs 3 x 10 reps.   TA Time 30 min 30 min   NMR Single balance on foam 5 x 30 sed with UE PRN on bars  Steamboats on foam 3x10 Single balance on foam 5 x 30 sed with UE PRN on bars  Steamboats on foam 3x10   NMR Time 10 min 10 min   MT PROM knee flexion and extension R and L PROM knee flexion and extension R and L   MT Time 0 min 15 min   PLAN Updated FOTO 3/7/25 Needs Updated FOTO   Cont with POC per protocol       Assessment & Plan   Assessment: Patient doing well with exercises and improved with ROM and tolerance to transitions.  Evaluation/Treatment Tolerance: Patient tolerated treatment well    Patient will continue to benefit from skilled outpatient physical therapy to address the deficits listed in the problem list box on initial evaluation, provide pt/family education and to maximize pt's level of independence in the home and community environment.     Patient's spiritual, cultural, and educational needs considered and patient agreeable to plan of care and goals.             Plan: PT 2 x per week for 6 weeks post-op.    Goals:   Active       Ambulation/movement       Patient will ambulate with normal gait pattern with no device. (Progressing)       Start:  02/07/25    Expected End:  03/21/25               Functional outcome       Patient will show a significant change in FOTO patient-reported outcome tool to 70 to demonstrate subjective improvement (Progressing)       Start:  02/07/25    Expected End:  03/21/25            Patient stated goal: Patient would like to return to her prior level of function without knee pain.  (Progressing)       Start:  02/07/25    Expected End:  03/21/25            Patient will  demonstrate independence in home program for support of progression (Progressing)       Start:  02/07/25    Expected End:  03/21/25               Pain       Patient will report pain of 3/10 demonstrating a reduction of overall pain (Progressing)       Start:  02/07/25    Expected End:  03/21/25            Patient will report a 2 point reduction in pain while performing ADL's. (Met)       Start:  02/07/25    Expected End:  02/28/25    Resolved:  02/28/25            Range of Motion       Patient will achieve bilateral knee ROM of  0-120 degrees  (Progressing)       Start:  02/07/25    Expected End:  03/21/25               Strength       Patient will achieve bilateral knee flexion strength of 4+/5 (Progressing)       Start:  02/07/25    Expected End:  03/21/25            Patient will achieve bilateral knee extension strength of 4+/5 (Progressing)       Start:  02/07/25    Expected End:  03/21/25              Resolved       Physical Therapy  prehab education and HEP       Assess ROM, strength, function, gait and swelling post-op and set new goals base on post-op measures. (Met)       Start:  01/28/25    Expected End:  02/07/25    Resolved:  02/07/25             Thierno Neff PT

## 2025-03-11 ENCOUNTER — CLINICAL SUPPORT (OUTPATIENT)
Dept: REHABILITATION | Facility: HOSPITAL | Age: 65
End: 2025-03-11
Attending: ORTHOPAEDIC SURGERY
Payer: COMMERCIAL

## 2025-03-11 DIAGNOSIS — M17.0 BILATERAL PRIMARY OSTEOARTHRITIS OF KNEE: Primary | ICD-10-CM

## 2025-03-11 PROCEDURE — 97112 NEUROMUSCULAR REEDUCATION: CPT | Mod: PN

## 2025-03-11 PROCEDURE — 97140 MANUAL THERAPY 1/> REGIONS: CPT | Mod: PN

## 2025-03-11 PROCEDURE — 97530 THERAPEUTIC ACTIVITIES: CPT | Mod: PN

## 2025-03-11 PROCEDURE — 97110 THERAPEUTIC EXERCISES: CPT | Mod: PN

## 2025-03-11 PROCEDURE — 97010 HOT OR COLD PACKS THERAPY: CPT | Mod: PN

## 2025-03-12 NOTE — PROGRESS NOTES
Outpatient Rehab    Physical Therapy Visit    Patient Name: Shirley Amaya  MRN: [id number]  YOB: 1960  Encounter Date: 3/11/2025    Therapy Diagnosis:   Encounter Diagnosis   Name Primary?    Bilateral primary osteoarthritis of knee Yes       Physician: Aniceto Good MD    Physician Orders: Eval and Treat  Medical Diagnosis: primary osteoarthritis of bilateral knees     Visit # / Visits Authorized:  10 / 20   Date of Evaluation: 2/7/2025 reevaluation after surgery.   Insurance Authorization Period: 1/27/2025 to 12/31/2025  Plan of Care Certification:  2/7/2025 to 3/21/2025      Time In: 0748   Time Out: 0900  Total Time: 72   Total Billable Time: 75       Subjective   Patient reports she is feeing better today but still has some pain increases on occasion when she does more activity..  Pain reported as 0/10. B knees.    Objective        Objective measures last taken on 2/28/2025 visit 8    Treatment:    Treatments performed today are in BOLD:      CPT Duration/ Details  3/11/2025 Duration/ Details  3/7/2025   TE Seated in chair: heel slides R and L skateboard 5 min each  Gastroc slant board 3 min  LAQ seated on foam in chair assisted with strap (on freemotion machine today) 7 lbs 5 min each leg  SAQ supine full roll 10 reps each leg 3 sets.  Quad sets supine 10 reps 3 sets R and L  Prone quad stretch 5 x 30 sec each leg 1/2 roll under thigh.  Calf raise 3 x 10 B UE support.  ICE to B Knees with elevation and AP, QS, GS x 10 min Seated in chair: heel slides R and L skateboard 5 min each  Gastroc slant board 3 min  LAQ seated on foam in chair assisted with strap (on freemotion machine today) 7 lbs 5 min each leg  SAQ supine full roll 10 reps each leg 3 sets.  Quad sets supine 10 reps 3 sets R and L  Calf raise 3 x 10 B UE support.  ICE to B Knees with elevation and AP, QS, GS x 10 min   TE Time 25 min 25 min   TA Nu Step (S=1 L=4) 10 min  Seated mini elliptical propped on 4 inch box 5 min  working on functional knee motion.  Bike S=5 L=10 able to do full revolutions x 10 min  Leg Press B 44 lbs 3 x 10 reps. Nu Step (S=1 L=4) 10 min  Seated mini elliptical propped on 4 inch box 5 min working on functional knee motion.  Bike S=5 L=10 able to do full revolutions x 10 min  Leg Press B 33 lbs 3 x 10 reps.   TA Time 30 min 30 min   NMR Single balance on foam 5 x 30 sed with UE PRN on bars  Steamboats on foam 3x10 Single balance on foam 5 x 30 sed with UE PRN on bars  Steamboats on foam 3x10   NMR Time 10 min 10 min   MT PROM knee flexion and extension R and L PROM knee flexion and extension R and L   MT Time 10 min 0 min   PLAN Cont with POC with increased focus on quad length. Updated FOTO 3/7/25       Assessment & Plan   Assessment: Patient progressed today to quad stretches with noted quad tightness with better knee flexion in supine than in prone B.  Evaluation/Treatment Tolerance: Patient tolerated treatment well    Patient will continue to benefit from skilled outpatient physical therapy to address the deficits listed in the problem list box on initial evaluation, provide pt/family education and to maximize pt's level of independence in the home and community environment.     Patient's spiritual, cultural, and educational needs considered and patient agreeable to plan of care and goals.             Plan: Cont wiht POC    Goals:   Active       Ambulation/movement       Patient will ambulate with normal gait pattern with no device. (Progressing)       Start:  02/07/25    Expected End:  03/21/25               Functional outcome       Patient will show a significant change in FOTO patient-reported outcome tool to 70 to demonstrate subjective improvement (Progressing)       Start:  02/07/25    Expected End:  03/21/25            Patient stated goal: Patient would like to return to her prior level of function without knee pain.  (Progressing)       Start:  02/07/25    Expected End:  03/21/25             Patient will demonstrate independence in home program for support of progression (Progressing)       Start:  02/07/25    Expected End:  03/21/25               Pain       Patient will report pain of 3/10 demonstrating a reduction of overall pain (Progressing)       Start:  02/07/25    Expected End:  03/21/25            Patient will report a 2 point reduction in pain while performing ADL's. (Met)       Start:  02/07/25    Expected End:  02/28/25    Resolved:  02/28/25            Range of Motion       Patient will achieve bilateral knee ROM of  0-120 degrees  (Progressing)       Start:  02/07/25    Expected End:  03/21/25               Strength       Patient will achieve bilateral knee flexion strength of 4+/5 (Progressing)       Start:  02/07/25    Expected End:  03/21/25            Patient will achieve bilateral knee extension strength of 4+/5 (Progressing)       Start:  02/07/25    Expected End:  03/21/25              Resolved       Physical Therapy  prehab education and HEP       Assess ROM, strength, function, gait and swelling post-op and set new goals base on post-op measures. (Met)       Start:  01/28/25    Expected End:  02/07/25    Resolved:  02/07/25             Thierno Neff PT

## 2025-03-14 ENCOUNTER — CLINICAL SUPPORT (OUTPATIENT)
Dept: REHABILITATION | Facility: HOSPITAL | Age: 65
End: 2025-03-14
Attending: ORTHOPAEDIC SURGERY
Payer: COMMERCIAL

## 2025-03-14 DIAGNOSIS — M17.0 BILATERAL PRIMARY OSTEOARTHRITIS OF KNEE: Primary | ICD-10-CM

## 2025-03-14 PROCEDURE — 97010 HOT OR COLD PACKS THERAPY: CPT | Mod: PN

## 2025-03-14 PROCEDURE — 97110 THERAPEUTIC EXERCISES: CPT | Mod: PN

## 2025-03-14 PROCEDURE — 97530 THERAPEUTIC ACTIVITIES: CPT | Mod: PN

## 2025-03-14 PROCEDURE — 97140 MANUAL THERAPY 1/> REGIONS: CPT | Mod: PN

## 2025-03-14 PROCEDURE — 97112 NEUROMUSCULAR REEDUCATION: CPT | Mod: PN

## 2025-03-17 NOTE — PROGRESS NOTES
Outpatient Rehab    Physical Therapy Visit    Patient Name: Shirley Amaya  MRN: [id number]  YOB: 1960  Encounter Date: 3/14/2025    Therapy Diagnosis:   Encounter Diagnosis   Name Primary?    Bilateral primary osteoarthritis of knee Yes     Physician: Aniceto Good MD    Physician Orders: Eval and Treat  Medical Diagnosis: Primary osteoarthritis of left knee  Primary osteoarthritis of right knee    Visit # / Visits Authorized:  11 / 20  Date of Evaluation: 1/27/2025  Insurance Authorization Period: 1/27/2025 to 12/31/2025  Plan of Care Certification:  1/28/2025 to 3/21/2025     Time In: 0747   Time Out: 0900  Total Time: 73   Total Billable Time: 73           Subjective   Pattient reports soreness in knees comes and goes with activity levels but scar tissue still feels tight with pulling and some swelling..  Pain reported as 2/10. B knees.    Objective        Objecitve measures last taken 2/28/2025 on visit 8.    Treatment:     Treatments performed today are in BOLD:    CPT Duration/ Details  3/14/2025   TE Seated in chair: heel slides R and L skateboard 5 min each  Gastroc slant board 3 min  LAQ seated on foam in chair assisted with strap (on freemotion machine today) 7 lbs 5 min each leg  SAQ supine full roll 10 reps each leg 3 sets.  Quad sets supine 10 reps 3 sets R and L  Prone quad stretch 5 x 30 sec each leg 1/2 roll under thigh.  Calf raise 3 x 10 B UE support.  ICE to B Knees with elevation and AP, QS, GS x 10 min  also demo of ice massage after scar tissue work.   TE Time 25 min   TA Nu Step (S=1 L=5) 10 min  Seated mini elliptical propped on 4 inch box 5 min working on functional knee motion.  Bike S=5 L=10 able to do full revolutions x 10 min  Leg Press B 44 lbs 3 x 10 reps.   TA Time 25 min   NMR Single balance on foam 5 x 30 sed with UE PRN on bars  Steamboats on foam 3x10   NMR Time 10 min   MT PROM knee flexion and extension R and L  Scar tissue cupping B knees.   MT Time 10  min   PLAN Cont with POC with increased focus on quad length.       Assessment & Plan   Assessment: Patient did well but scar tissue was tight and cupping added to help with scar mobility around anterior knee.  She had some increased pain with added scar tissue work but lessened with ice.  Evaluation/Treatment Tolerance: Patient tolerated treatment well    Patient will continue to benefit from skilled outpatient physical therapy to address the deficits listed in the problem list box on initial evaluation, provide pt/family education and to maximize pt's level of independence in the home and community environment.     Patient's spiritual, cultural, and educational needs considered and patient agreeable to plan of care and goals.           Plan: Cont wiht POC    Goals:   Active       Ambulation/movement       Patient will ambulate with normal gait pattern with no device. (Progressing)       Start:  02/07/25    Expected End:  03/21/25               Functional outcome       Patient will show a significant change in FOTO patient-reported outcome tool to 70 to demonstrate subjective improvement (Progressing)       Start:  02/07/25    Expected End:  03/21/25            Patient stated goal: Patient would like to return to her prior level of function without knee pain.  (Progressing)       Start:  02/07/25    Expected End:  03/21/25            Patient will demonstrate independence in home program for support of progression (Progressing)       Start:  02/07/25    Expected End:  03/21/25               Pain       Patient will report pain of 3/10 demonstrating a reduction of overall pain (Progressing)       Start:  02/07/25    Expected End:  03/21/25            Patient will report a 2 point reduction in pain while performing ADL's. (Met)       Start:  02/07/25    Expected End:  02/28/25    Resolved:  02/28/25            Range of Motion       Patient will achieve bilateral knee ROM of  0-120 degrees  (Progressing)       Start:   02/07/25    Expected End:  03/21/25               Strength       Patient will achieve bilateral knee flexion strength of 4+/5 (Progressing)       Start:  02/07/25    Expected End:  03/21/25            Patient will achieve bilateral knee extension strength of 4+/5 (Progressing)       Start:  02/07/25    Expected End:  03/21/25              Resolved       Physical Therapy  prehab education and HEP       Assess ROM, strength, function, gait and swelling post-op and set new goals base on post-op measures. (Met)       Start:  01/28/25    Expected End:  02/07/25    Resolved:  02/07/25             Thierno Neff, PT

## 2025-03-18 ENCOUNTER — CLINICAL SUPPORT (OUTPATIENT)
Dept: REHABILITATION | Facility: HOSPITAL | Age: 65
End: 2025-03-18
Attending: ORTHOPAEDIC SURGERY
Payer: COMMERCIAL

## 2025-03-18 DIAGNOSIS — M17.0 BILATERAL PRIMARY OSTEOARTHRITIS OF KNEE: Primary | ICD-10-CM

## 2025-03-18 PROCEDURE — 97140 MANUAL THERAPY 1/> REGIONS: CPT | Mod: PN

## 2025-03-18 PROCEDURE — 97110 THERAPEUTIC EXERCISES: CPT | Mod: PN

## 2025-03-18 PROCEDURE — 97530 THERAPEUTIC ACTIVITIES: CPT | Mod: PN

## 2025-03-18 PROCEDURE — 97010 HOT OR COLD PACKS THERAPY: CPT | Mod: PN

## 2025-03-18 PROCEDURE — 97112 NEUROMUSCULAR REEDUCATION: CPT | Mod: PN

## 2025-03-19 NOTE — PROGRESS NOTES
Outpatient Rehab    Physical Therapy Visit    Patient Name: Shirley Amaya  MRN: [id number]  YOB: 1960  Encounter Date: 3/18/2025    Therapy Diagnosis:   Encounter Diagnosis   Name Primary?    Bilateral primary osteoarthritis of knee Yes     Physician: Aniceto Good MD    Physician Orders: Eval and Treat  Medical Diagnosis: Primary osteoarthritis of left knee  Primary osteoarthritis of right knee    Visit # / Visits Authorized:  12 / 20  Date of Evaluation: 1/27/2025  Insurance Authorization Period: 1/27/2025 to 12/31/2025  Plan of Care Certification:  1/28/2025 to 3/21/2025     Time In: 0700   Time Out: 0810  Total Time: 70   Total Billable Time: 73           Subjective   Patient reports she feels much better and less pain..         Objective        Objecitve measures last taken 2/28/2025 on visit 8.    Treatment:     Treatments performed today are in BOLD:    CPT Duration/ Details  3/18/2025   TE Seated in chair: heel slides R and L skateboard 5 min each  Gastroc slant board 3 min  LAQ seated on foam in chair assisted with strap (on freemotion machine today) 7 lbs 5 min each leg  SAQ supine full roll 10 reps each leg 3 sets.  Quad sets supine 10 reps 3 sets R and L  Prone quad stretch 5 x 30 sec each leg 1/2 roll under thigh.  Calf raise 3 x 10 B UE support.  ICE to B Knees with elevation and AP, QS, GS x 10 min.   TE Time 25 min   TA Nu Step (S=1 L=6) 10 min  Seated mini elliptical propped on 4 inch box 5 min working on functional knee motion.  Bike S=5 L=10 able to do full revolutions x 10 min  Leg Press B 44 lbs 3 x 10 reps.   TA Time 25 min   NMR Single balance on foam 5 x 30 sed with UE PRN on bars  Steamboats on foam YTB 3x10   NMR Time 10 min   MT PROM knee flexion and extension R and L  Scar tissue cupping B knees.   MT Time 10 min   PLAN Cont with POC with increased focus on quad length.       Assessment & Plan   Assessment: Patient still with some tenderness with cupping to scar  tissue but much better than first session this was done and better knee ROM.  Evaluation/Treatment Tolerance: Patient tolerated treatment well    Patient will continue to benefit from skilled outpatient physical therapy to address the deficits listed in the problem list box on initial evaluation, provide pt/family education and to maximize pt's level of independence in the home and community environment.     Patient's spiritual, cultural, and educational needs considered and patient agreeable to plan of care and goals.           Plan: Cont with POC 1 more visit and then move to independent HEP.    Goals:   Active       Ambulation/movement       Patient will ambulate with normal gait pattern with no device. (Progressing)       Start:  02/07/25    Expected End:  03/21/25               Functional outcome       Patient will show a significant change in FOTO patient-reported outcome tool to 70 to demonstrate subjective improvement (Progressing)       Start:  02/07/25    Expected End:  03/21/25            Patient stated goal: Patient would like to return to her prior level of function without knee pain.  (Progressing)       Start:  02/07/25    Expected End:  03/21/25            Patient will demonstrate independence in home program for support of progression (Progressing)       Start:  02/07/25    Expected End:  03/21/25               Pain       Patient will report pain of 3/10 demonstrating a reduction of overall pain (Progressing)       Start:  02/07/25    Expected End:  03/21/25            Patient will report a 2 point reduction in pain while performing ADL's. (Met)       Start:  02/07/25    Expected End:  02/28/25    Resolved:  02/28/25            Range of Motion       Patient will achieve bilateral knee ROM of  0-120 degrees  (Progressing)       Start:  02/07/25    Expected End:  03/21/25               Strength       Patient will achieve bilateral knee flexion strength of 4+/5 (Progressing)       Start:  02/07/25     Expected End:  03/21/25            Patient will achieve bilateral knee extension strength of 4+/5 (Progressing)       Start:  02/07/25    Expected End:  03/21/25              Resolved       Physical Therapy  prehab education and HEP       Assess ROM, strength, function, gait and swelling post-op and set new goals base on post-op measures. (Met)       Start:  01/28/25    Expected End:  02/07/25    Resolved:  02/07/25             Thierno Neff, PT

## 2025-03-21 ENCOUNTER — CLINICAL SUPPORT (OUTPATIENT)
Dept: REHABILITATION | Facility: HOSPITAL | Age: 65
End: 2025-03-21
Attending: ORTHOPAEDIC SURGERY
Payer: COMMERCIAL

## 2025-03-21 DIAGNOSIS — M17.0 BILATERAL PRIMARY OSTEOARTHRITIS OF KNEE: Primary | ICD-10-CM

## 2025-03-21 NOTE — PROGRESS NOTES
Outpatient Rehab    Physical Therapy Discharge    Patient Name: Shirley Amaya  MRN: [id number]  YOB: 1960  Encounter Date: 3/21/2025    Therapy Diagnosis:   Encounter Diagnosis   Name Primary?    Bilateral primary osteoarthritis of knee Yes     Physician: Aniceto Good MD    Physician Orders: Eval and Treat  Medical Diagnosis: Primary osteoarthritis of left knee  Primary osteoarthritis of right knee    Visit # / Visits Authorized:  13 / 20  Insurance Authorization Period: 1/27/2025 to 12/31/2025  Date of Evaluation: 1/27/2025  Plan of Care Certification: 1/28/2025 to 3/21/2025       Time In: 0750   Time Out: 0900  Total Time: 70   Total Billable Time:  70 min    FOTO:  Intake Score: 31%  Survey Score 1: 56%  Survey Score 2:  %         Subjective   Patient reports some increased pain today but doing more at work now and more at home as well..  Pain reported as 3/10.      Objective       Knee Range of Motion   Right Knee   Active (deg) Passive (deg) Pain   Flexion 130       Extension 0           Left Knee   Active (deg) Passive (deg) Pain   Flexion 130       Extension 0                          Hip Strength - Planes of Motion   Right Strength Right Pain Left Strength Left  Pain   Flexion (L2) 4+   4+     Extension           ABduction           ADduction           Internal Rotation           External Rotation               Knee Strength   Right Strength Right Pain Left Strength Left  Pain   Flexion (S2) 4+   4+     Prone Flexion           Extension (L3) 4+   4+                   Balance Test       Single Leg Stand - Right Foot: 30 sec  Single Leg Stand - Left Foot: 30 sec       Ambulation Assistance Required  Surface With  Assistive Device Without Assistive Device Details   Level Independent Independent      Uneven Independent Independent     Curb           Stairs Assistance Required   Assistance Level Upper Extremity Support Pattern   Ascending Independent Without rails Reciprocal   Descending  Independent One rail Reciprocal        Gait Analysis  Base of Support: Normal    Right Foot Contact Pattern: Heel to toe    Left Foot Contact Pattern: Heel to toe         Treatment:     Treatments performed today are in BOLD:    CPT Duration/ Details  3/21/2025   TE Seated in chair: heel slides R and L skateboard 5 min each  Gastroc slant board 3 min  LAQ seated on foam in chair assisted with strap (on freemotion machine today) 7 lbs 5 min each leg  SAQ supine full roll 10 reps each leg 3 sets.  Quad sets supine 10 reps 3 sets R and L  Prone quad stretch 5 x 30 sec each leg 1/2 roll under thigh.  Calf raise 3 x 10 B UE support.  ICE to B Knees with elevation and AP, QS, GS x 10 min.   TE Time 25 min   TA Nu Step (S=1 L=6) 10 min  Seated mini elliptical propped on 4 inch box 5 min working on functional knee motion.  Bike S=5 L=10 able to do full revolutions x 10 min  Leg Press B 44 lbs 3 x 10 reps.   TA Time 25 min   NMR Single balance on  5 x 30 sed with UE PRN on bars  Steamboats on YTB 3x10   NMR Time 10 min   MT PROM knee flexion and extension R and L  Scar tissue cupping B knees.   MT Time 10 min   PLAN DC to independent HEP          Assessment & Plan   Assessment: Patient doing well and has met most of her LTG's and she is ready to make transition to HEP.  Evaluation/Treatment Tolerance: Patient tolerated treatment well    Patient's spiritual, cultural, and educational needs considered and patient agreeable to plan of care and goals.     Education  Education was done with Patient. The patient's learning style includes Demonstration, Listening, and Pictures/video. The patient Demonstrates understanding and Verbalizes understanding.                 Plan: Discharge to independent HEP.    Goals:   Active       Functional outcome       Patient will show a significant change in FOTO patient-reported outcome tool to 70 to demonstrate subjective improvement (Adequate for Care Transition)       Start:  02/07/25     Expected End:  03/21/25            Patient stated goal: Patient would like to return to her prior level of function without knee pain.  (Adequate for Care Transition)       Start:  02/07/25    Expected End:  03/21/25            Patient will demonstrate independence in home program for support of progression (Met)       Start:  02/07/25    Expected End:  03/21/25    Resolved:  03/23/25            Strength       Patient will achieve bilateral knee flexion strength of 4+/5 (Met)       Start:  02/07/25    Expected End:  03/21/25    Resolved:  03/23/25         Patient will achieve bilateral knee extension strength of 4+/5 (Met)       Start:  02/07/25    Expected End:  03/21/25    Resolved:  03/23/25           Resolved       Ambulation/movement       Patient will ambulate with normal gait pattern with no device. (Met)       Start:  02/07/25    Expected End:  03/21/25    Resolved:  03/23/25            Pain       Patient will report pain of 3/10 demonstrating a reduction of overall pain (Met)       Start:  02/07/25    Expected End:  03/21/25    Resolved:  03/23/25         Patient will report a 2 point reduction in pain while performing ADL's. (Met)       Start:  02/07/25    Expected End:  02/28/25    Resolved:  02/28/25            Physical Therapy  prehab education and HEP       Assess ROM, strength, function, gait and swelling post-op and set new goals base on post-op measures. (Met)       Start:  01/28/25    Expected End:  02/07/25    Resolved:  02/07/25            Range of Motion       Patient will achieve bilateral knee ROM of  0-120 degrees  (Met)       Start:  02/07/25    Expected End:  03/21/25    Resolved:  03/23/25             Thierno Neff PT

## 2025-03-28 DIAGNOSIS — R73.03 PREDIABETES: ICD-10-CM

## 2025-03-28 RX ORDER — METFORMIN HYDROCHLORIDE 500 MG/1
500 TABLET ORAL 2 TIMES DAILY WITH MEALS
Qty: 180 TABLET | Refills: 0 | Status: SHIPPED | OUTPATIENT
Start: 2025-03-28

## 2025-03-28 NOTE — TELEPHONE ENCOUNTER
Provider Staff:  Action required for this patient    Requires appointment      Please see care gap opportunities below in Care Due Message.    Thanks!  [facility]     Appointments      Date Provider   Last Visit   2/19/2024 Gauri Naidu MD   Next Visit   Visit date not found Gauri Naidu MD     Refill Decision Note   Shirley Amaya  is requesting a refill authorization.  Brief Assessment and Rationale for Refill:  Approve     Medication Therapy Plan:        Comments:     Note composed:7:51 AM 03/28/2025

## 2025-03-28 NOTE — TELEPHONE ENCOUNTER
Care Due:                  Date            Visit Type   Department     Provider  --------------------------------------------------------------------------------                                EP -                              PRIMARY      [facility]  Gauri  Last Visit: 02-      CARE ([facility])   MEDICINE       [place]  Next Visit: None Scheduled  None         None Found                                                            Last  Test          Frequency    Reason                     Performed    Due Date  --------------------------------------------------------------------------------    Office Visit  15 months..  metFORMIN................  02- 05-    [facility] Embedded Care Due Messages. Reference number: [id number].   3/28/2025 6:50:05 AM CDT

## 2025-05-28 ENCOUNTER — OFFICE VISIT (OUTPATIENT)
Dept: INTERNAL MEDICINE | Facility: CLINIC | Age: 65
End: 2025-05-28
Payer: COMMERCIAL

## 2025-05-28 ENCOUNTER — LAB VISIT (OUTPATIENT)
Dept: LAB | Facility: HOSPITAL | Age: 65
End: 2025-05-28
Payer: COMMERCIAL

## 2025-05-28 VITALS
DIASTOLIC BLOOD PRESSURE: 78 MMHG | RESPIRATION RATE: 16 BRPM | SYSTOLIC BLOOD PRESSURE: 118 MMHG | OXYGEN SATURATION: 95 % | BODY MASS INDEX: 29.02 KG/M2 | HEART RATE: 81 BPM | HEIGHT: 64 IN | TEMPERATURE: 98 F | WEIGHT: 170 LBS

## 2025-05-28 DIAGNOSIS — Z96.653 S/P TKR (TOTAL KNEE REPLACEMENT), BILATERAL: ICD-10-CM

## 2025-05-28 DIAGNOSIS — R73.03 PREDIABETES: ICD-10-CM

## 2025-05-28 DIAGNOSIS — Z00.00 ROUTINE GENERAL MEDICAL EXAMINATION AT A HEALTH CARE FACILITY: Primary | ICD-10-CM

## 2025-05-28 DIAGNOSIS — Z00.00 ENCOUNTER FOR SCREENING AND PREVENTATIVE CARE: ICD-10-CM

## 2025-05-28 DIAGNOSIS — E78.2 MIXED HYPERLIPIDEMIA: ICD-10-CM

## 2025-05-28 DIAGNOSIS — Z12.31 SCREENING MAMMOGRAM FOR BREAST CANCER: ICD-10-CM

## 2025-05-28 DIAGNOSIS — G47.33 OSA ON CPAP: ICD-10-CM

## 2025-05-28 LAB
ABSOLUTE EOSINOPHIL (OHS): 0.16 K/UL
ABSOLUTE MONOCYTE (OHS): 0.47 K/UL (ref 0.3–1)
ABSOLUTE NEUTROPHIL COUNT (OHS): 3.13 K/UL (ref 1.8–7.7)
ALBUMIN SERPL BCP-MCNC: 4.2 G/DL (ref 3.5–5.2)
ALP SERPL-CCNC: 74 UNIT/L (ref 40–150)
ALT SERPL W/O P-5'-P-CCNC: 10 UNIT/L (ref 10–44)
ANION GAP (OHS): 10 MMOL/L (ref 8–16)
AST SERPL-CCNC: 13 UNIT/L (ref 11–45)
BASOPHILS # BLD AUTO: 0.04 K/UL
BASOPHILS NFR BLD AUTO: 0.7 %
BILIRUB SERPL-MCNC: 0.5 MG/DL (ref 0.1–1)
BUN SERPL-MCNC: 12 MG/DL (ref 8–23)
CALCIUM SERPL-MCNC: 9.7 MG/DL (ref 8.7–10.5)
CHLORIDE SERPL-SCNC: 107 MMOL/L (ref 95–110)
CHOLEST SERPL-MCNC: 177 MG/DL (ref 120–199)
CHOLEST/HDLC SERPL: 2.9 {RATIO} (ref 2–5)
CO2 SERPL-SCNC: 27 MMOL/L (ref 23–29)
CREAT SERPL-MCNC: 0.5 MG/DL (ref 0.5–1.4)
EAG (OHS): 128 MG/DL (ref 68–131)
ERYTHROCYTE [DISTWIDTH] IN BLOOD BY AUTOMATED COUNT: 15.2 % (ref 11.5–14.5)
GFR SERPLBLD CREATININE-BSD FMLA CKD-EPI: >60 ML/MIN/1.73/M2
GLUCOSE SERPL-MCNC: 94 MG/DL (ref 70–110)
HBA1C MFR BLD: 6.1 % (ref 4–5.6)
HCT VFR BLD AUTO: 39.1 % (ref 37–48.5)
HDLC SERPL-MCNC: 62 MG/DL (ref 40–75)
HDLC SERPL: 35 % (ref 20–50)
HGB BLD-MCNC: 12.2 GM/DL (ref 12–16)
IMM GRANULOCYTES # BLD AUTO: 0.01 K/UL (ref 0–0.04)
IMM GRANULOCYTES NFR BLD AUTO: 0.2 % (ref 0–0.5)
LDLC SERPL CALC-MCNC: 94.8 MG/DL (ref 63–159)
LYMPHOCYTES # BLD AUTO: 2.08 K/UL (ref 1–4.8)
MCH RBC QN AUTO: 28.1 PG (ref 27–31)
MCHC RBC AUTO-ENTMCNC: 31.2 G/DL (ref 32–36)
MCV RBC AUTO: 90 FL (ref 82–98)
NONHDLC SERPL-MCNC: 115 MG/DL
NUCLEATED RBC (/100WBC) (OHS): 0 /100 WBC
PLATELET # BLD AUTO: 283 K/UL (ref 150–450)
PMV BLD AUTO: 10.4 FL (ref 9.2–12.9)
POTASSIUM SERPL-SCNC: 4.3 MMOL/L (ref 3.5–5.1)
PROT SERPL-MCNC: 7.2 GM/DL (ref 6–8.4)
RBC # BLD AUTO: 4.34 M/UL (ref 4–5.4)
RELATIVE EOSINOPHIL (OHS): 2.7 %
RELATIVE LYMPHOCYTE (OHS): 35.3 % (ref 18–48)
RELATIVE MONOCYTE (OHS): 8 % (ref 4–15)
RELATIVE NEUTROPHIL (OHS): 53.1 % (ref 38–73)
SODIUM SERPL-SCNC: 144 MMOL/L (ref 136–145)
TRIGL SERPL-MCNC: 101 MG/DL (ref 30–150)
TSH SERPL-ACNC: 1.52 UIU/ML (ref 0.4–4)
WBC # BLD AUTO: 5.89 K/UL (ref 3.9–12.7)

## 2025-05-28 PROCEDURE — 83718 ASSAY OF LIPOPROTEIN: CPT

## 2025-05-28 PROCEDURE — 83036 HEMOGLOBIN GLYCOSYLATED A1C: CPT

## 2025-05-28 PROCEDURE — 82947 ASSAY GLUCOSE BLOOD QUANT: CPT

## 2025-05-28 PROCEDURE — 84443 ASSAY THYROID STIM HORMONE: CPT

## 2025-05-28 PROCEDURE — 36415 COLL VENOUS BLD VENIPUNCTURE: CPT | Mod: PO

## 2025-05-28 PROCEDURE — 85025 COMPLETE CBC W/AUTO DIFF WBC: CPT

## 2025-05-28 PROCEDURE — 99999 PR PBB SHADOW E&M-EST. PATIENT-LVL IV: CPT | Mod: PBBFAC,,,

## 2025-05-28 NOTE — ASSESSMENT & PLAN NOTE
Chronic, controlled on Lipitor  Most recent lipid panel unremarkable  Will recheck fasting lipid panel today  Continue Lipitor, diet, and exercise   [address] Comprehensive

## 2025-05-28 NOTE — PROGRESS NOTES
Subjective:       Patient ID: Shirley Amaya is a 65 y.o. female.    Chief Complaint: Annual Exam    65-year-old female presents for annual exam     Last pap 1/2022    Postmenopausal                                                                                                                                                                       Currently sexually active, no concerns for STIs                                                                                                      Last mammogram 11/2024                                                                                                                           Denies breast lumps, bumps, nipple discharge, change in contour                                                                               Last colon cancer screening 2/2022, repeat 2027                                                                                                                         Last DXA 2/2022-osteopenia  Denies increased urinary urgency or frequency                                                                                                                                        Denies diarrhea, constipation                                                                                                                                                                                Last eye exam within the last year, wears glasses  Last dental exam over 6mo ago, no dental disease, brushes teeth BID  Wears her seatbelt in the car  Tries to eat a well balanced diet  Sleeps well most nights with CPAP  Exercises a few times a week with bike and weights, very active in yard                                                                                                                                              Works full time at a newspaper  Denies chest pain, palpitations, dyspnea, edema, changes in vision, flashes, floaters, myalgia, joint pain,  "numbness and tingling, weakness, syncope.  Denies recent stress, feelings of anxiety and depression    Pt underwent bilateral total knee replacements by Dr Good on 2/3.  No complications, pt doing well.        /78 (BP Location: Left arm, Patient Position: Sitting)   Pulse 81   Temp 97.5 °F (36.4 °C) (Tympanic)   Resp 16   Ht 5' 4" (1.626 m)   Wt 77.1 kg (169 lb 15.6 oz)   LMP  (LMP Unknown)   SpO2 95%   BMI 29.18 kg/m²     Review of Systems   Constitutional:  Negative for chills and fever.   Eyes:  Negative for visual disturbance.   Respiratory:  Negative for chest tightness and shortness of breath.    Cardiovascular:  Negative for chest pain, palpitations and leg swelling.   Gastrointestinal:  Negative for constipation, diarrhea, nausea and vomiting.   Genitourinary:  Negative for difficulty urinating.   Neurological:  Negative for headaches.   All other systems reviewed and are negative.      Objective:      Physical Exam  Constitutional:       General: She is not in acute distress.     Appearance: Normal appearance. She is not ill-appearing or toxic-appearing.   HENT:      Head: Normocephalic and atraumatic.      Right Ear: Tympanic membrane, ear canal and external ear normal.      Left Ear: Tympanic membrane, ear canal and external ear normal.      Nose: Nose normal.      Mouth/Throat:      Mouth: Mucous membranes are moist.      Pharynx: Oropharynx is clear.   Eyes:      Extraocular Movements: Extraocular movements intact.      Conjunctiva/sclera: Conjunctivae normal.      Pupils: Pupils are equal, round, and reactive to light.   Cardiovascular:      Rate and Rhythm: Normal rate and regular rhythm.      Pulses:           Radial pulses are 2+ on the right side and 2+ on the left side.   Pulmonary:      Effort: Pulmonary effort is normal.      Breath sounds: Normal breath sounds.   Abdominal:      General: Bowel sounds are normal.      Palpations: Abdomen is soft.   Musculoskeletal:         General: " Normal range of motion.      Cervical back: Normal range of motion and neck supple.   Skin:     General: Skin is warm and dry.   Neurological:      General: No focal deficit present.      Mental Status: She is alert and oriented to person, place, and time.   Psychiatric:         Mood and Affect: Mood normal.         Behavior: Behavior normal.         Thought Content: Thought content normal.         Judgment: Judgment normal.         Assessment:       1. Routine general medical examination at a health care facility    2. Prediabetes    3. Mixed hyperlipidemia    4. YUDITH on CPAP    5. Encounter for screening and preventative care    6. Screening mammogram for breast cancer        Plan:       Shirley was seen today for annual exam.    Diagnoses and all orders for this visit:    Routine general medical examination at a health care facility    Prediabetes  -     CBC Auto Differential; Future  -     Hemoglobin A1C; Future  Chronic, controlled on metformin  Most recent A1C 5.8 three months ago  Will recheck today  Continue metformin, diet, and exercise    Mixed hyperlipidemia  -     CBC Auto Differential; Future  -     Lipid Panel; Future  Chronic, controlled on Lipitor  Most recent lipid panel unremarkable  Will recheck fasting lipid panel today  Continue Lipitor, diet, and exercise    YDUITH on CPAP  -     CBC Auto Differential; Future  Compliant with CPAP, feels well rested  Continue CPAP    Encounter for screening and preventative care  -     Comprehensive Metabolic Panel; Future  -     TSH; Future    Screening mammogram for breast cancer  -     Mammo Digital Screening Bilat w/ Rich (XPD); Future    Discussed age appropriate anticipatory guidance, healthy diet and lifestyle, regular exercise, weight management.  Updated hm  Will contact with results and recommendations  Follow up annually and prn     Follow up in about 1 year (around 5/28/2026), or if symptoms worsen or fail to improve, for annual.

## 2025-05-29 ENCOUNTER — RESULTS FOLLOW-UP (OUTPATIENT)
Dept: INTERNAL MEDICINE | Facility: CLINIC | Age: 65
End: 2025-05-29

## 2025-07-30 ENCOUNTER — PATIENT MESSAGE (OUTPATIENT)
Dept: INTERNAL MEDICINE | Facility: CLINIC | Age: 65
End: 2025-07-30
Payer: COMMERCIAL

## 2025-07-31 RX ORDER — METFORMIN HYDROCHLORIDE 500 MG/1
500 TABLET ORAL 2 TIMES DAILY WITH MEALS
Qty: 180 TABLET | Refills: 3 | Status: SHIPPED | OUTPATIENT
Start: 2025-07-31

## 2025-08-06 ENCOUNTER — OFFICE VISIT (OUTPATIENT)
Dept: ORTHOPEDICS | Facility: CLINIC | Age: 65
End: 2025-08-06
Payer: COMMERCIAL

## 2025-08-06 VITALS — HEIGHT: 64 IN | BODY MASS INDEX: 29.02 KG/M2 | WEIGHT: 170 LBS

## 2025-08-06 DIAGNOSIS — Z96.651 STATUS POST RIGHT KNEE REPLACEMENT: Primary | ICD-10-CM

## 2025-08-06 DIAGNOSIS — Z96.652 STATUS POST TOTAL LEFT KNEE REPLACEMENT: ICD-10-CM

## 2025-08-06 PROCEDURE — 1160F RVW MEDS BY RX/DR IN RCRD: CPT | Mod: CPTII,S$GLB,, | Performed by: ORTHOPAEDIC SURGERY

## 2025-08-06 PROCEDURE — 99999 PR PBB SHADOW E&M-EST. PATIENT-LVL III: CPT | Mod: PBBFAC,,, | Performed by: ORTHOPAEDIC SURGERY

## 2025-08-06 PROCEDURE — 99213 OFFICE O/P EST LOW 20 MIN: CPT | Mod: S$GLB,,, | Performed by: ORTHOPAEDIC SURGERY

## 2025-08-06 PROCEDURE — 3008F BODY MASS INDEX DOCD: CPT | Mod: CPTII,S$GLB,, | Performed by: ORTHOPAEDIC SURGERY

## 2025-08-06 PROCEDURE — 1159F MED LIST DOCD IN RCRD: CPT | Mod: CPTII,S$GLB,, | Performed by: ORTHOPAEDIC SURGERY

## 2025-08-06 PROCEDURE — 3044F HG A1C LEVEL LT 7.0%: CPT | Mod: CPTII,S$GLB,, | Performed by: ORTHOPAEDIC SURGERY

## 2025-08-06 NOTE — PROGRESS NOTES
Aniceto Good MD  Orthopedic Surgeon   [address]   [place] [address]         Name: Shirley Amaya  MRN: [id number]    Patient ID: Shirley Amaya is a 65 y.o. female     Reason for visit:  Months postop bilateral knee replacement    Patient Instructions     Encounter Diagnoses   Name Primary?    Status post right knee replacement Yes    Status post total left knee replacement        ASSESSMENT:  The patient is progressing extremely well at 6 months both knees  No pain on the left, minimal pain on the right      TREATMENT/PLAN:  Continue with dual action ibuprofen/Tylenol as needed  Continue activities As tolerated enjoying life.    The patient to return to office February to March 20, 2026 for a 1 year postop checkup with x-rays of both knees    HISTORY OF PRESENT ILLNESS:   Shirley Amaya is a 65 y.o. female.Patient presents today at her six-month checkup from bilateral knee replacement  Describes 0/10 pain of the left knee.  2/10 pain on the right knee with some mild swelling and soreness.  Occasional stiffness.  Only taken dual action Tylenol/ibuprofen which works well.  Overall she is extremely pleased with the returned to quality of life and happy with her daily activity level.    Objective     XRAY:  None today    PHYSICAL EXAMINATION: Body mass index is 29.18 kg/m².    GENERAL:  Pleasant cooperative alert  female who is well dressed and well groomed.    EXTREMITY:  Both right and left knees flex beyond 130° and come to full extension.  Well-healed anterior incision on both knees.  Mild soft tissue fullness of the superolateral right knee without significant effusion.  Skin temperature tone is normal.  No peripheral edema in either ankle or foot.  Ambulating  well.      MEDICATIONS: Current Medications[1]    ALLERGIES: Review of patient's allergies indicates:  No Known Allergies    MEDICAL HISTORY:   Past Medical History:   Diagnosis Date    Abnormal glucose     Cancer     cervical    Diabetes mellitus, type 2     Hyperlipidemia     OA (osteoarthritis) of knee     YUDITH on CPAP        SURGICAL HISTORY:   Past Surgical History:   Procedure Laterality Date    CERVICAL CONIZATION   W/ LASER      COLONOSCOPY N/A 11/30/2016    Procedure: COLONOSCOPY;  Surgeon: Mary Victor MD;  Location: [facility] ENDO;  Service: Endoscopy;  Laterality: N/A;    COLONOSCOPY N/A 02/28/2022    Procedure: COLONOSCOPY;  Surgeon: Andrae Barger MD;  Location: [facility] ENDO;  Service: Endoscopy;  Laterality: N/A;    TOTAL KNEE ARTHROPLASTY Bilateral 2/3/2025    Procedure: ARTHROPLASTY, KNEE, TOTAL;  Surgeon: Aniceto Good MD;  Location: [facility] OR;  Service: Orthopedics;  Laterality: Bilateral;         Patient Type: Established Patient  Visit Type: (CPT 55493 - Estab 20-29 min)     Twenty minute patient encounter  This includes face to face time and non-face to face time preparing to see the patient (eg, review of tests),   obtaining and/or reviewing separately obtained history, documenting clinical information in the electronic or other health record, independently interpreting results   and communicating results to the patient/family/caregiver, or care coordinator.     DISCLAIMER: This note was prepared with Re[employer]able voice recognition transcription software. Garbled syntax, mangled pronouns, and other bizarre constructions may be attributed to that software system.      Aniceto Good M.D.  Orthopedic Surgeon  [facility]           [1]   Current Outpatient Medications:     atorvastatin (LIPITOR) 20 MG tablet, Take 20 mg by mouth once daily., Disp: , Rfl:     calcium carbonate/vitamin D3 (CALCIUM PETITES ORAL), Take by mouth., Disp: , Rfl:     clobetasol 0.05% (TEMOVATE) 0.05 % Oint,  Apply topically nightly as needed (itching). Do not use for more than 2 weeks continously, Disp: 30 g, Rfl: 3    cyanocobalamin/thiamine HCl (PONCHO-B-12 ORAL), Take by mouth., Disp: , Rfl:     meclizine (ANTIVERT) 25 mg tablet, Take 1 tablet (25 mg total) by mouth 3 (three) times daily as needed for Dizziness., Disp: 30 tablet, Rfl: 1    metFORMIN (GLUCOPHAGE) 500 MG tablet, Take 1 tablet (500 mg total) by mouth 2 (two) times daily with meals., Disp: 180 tablet, Rfl: 3

## 2025-08-06 NOTE — PATIENT INSTRUCTIONS
Encounter Diagnoses   Name Primary?    Status post right knee replacement Yes    Status post total left knee replacement        ASSESSMENT:  The patient is progressing extremely well at 6 months both knees  No pain on the left, minimal pain on the right      TREATMENT/PLAN:  Continue with dual action ibuprofen/Tylenol as needed  Continue activities As tolerated enjoying life.    The patient to return to office February to March 20, 2026 for a 1 year postop checkup with x-rays of both knees

## (undated) DEVICE — SOCKINETTE IMPERVIOUS 12X48IN

## (undated) DEVICE — TRAY CATH FOL SIL URIMTR 16FR

## (undated) DEVICE — MARKER SKIN RULER STERILE

## (undated) DEVICE — ELECTRODE REM PLYHSV RETURN 9

## (undated) DEVICE — ALCOHOL 70% ANTISEPTIC ISO 4OZ

## (undated) DEVICE — COVER LIGHT HANDLE 80/CA

## (undated) DEVICE — DRAPE THREE-QTR REINF 53X77IN

## (undated) DEVICE — COVER CAMERA OPERATING ROOM

## (undated) DEVICE — ZIPPER T4/TS TOGA

## (undated) DEVICE — NDL SAFETY 22G X 1.5 ECLIPSE

## (undated) DEVICE — SYR IRRIGATION BULB STER 60ML

## (undated) DEVICE — DRESSING PICO 7 TWO 10X30CM

## (undated) DEVICE — BLADE SURG CARBON STEEL #10

## (undated) DEVICE — MIXER BONE CEMENT

## (undated) DEVICE — COVER TABLE HVY DTY 60X90IN

## (undated) DEVICE — BANDAGE MATRIX HK LOOP 6IN 5YD

## (undated) DEVICE — Device

## (undated) DEVICE — ELECTRODE BLD 1 INCH TEFLON

## (undated) DEVICE — EVACUATOR PENCIL SMOKE NEPTUNE

## (undated) DEVICE — WRAP PROTECTIVE LEG POS STRL

## (undated) DEVICE — DRAPE T LIMB BILATERAL STERILE

## (undated) DEVICE — BRUSH SCRUB HIBICLENS 4%

## (undated) DEVICE — SYR 30CC LUER LOCK

## (undated) DEVICE — SPONGE LAP 18X18 PREWASHED

## (undated) DEVICE — STOCKINETTE TUBULAR 2PL 6 X 4

## (undated) DEVICE — BANDAGE ESMARK ELASTIC ST 6X9

## (undated) DEVICE — DEVICE STRATAFIX SYMMETRIC +

## (undated) DEVICE — DRAPE U SPLIT SHEET 54X76IN

## (undated) DEVICE — KIT TURNOVER

## (undated) DEVICE — UNDERGLOVES BIOGEL PI SIZE 8

## (undated) DEVICE — TAPE SURGICAL MICROFOAM 4IN

## (undated) DEVICE — COVER PROXIMA MAYO STAND

## (undated) DEVICE — BLADES SAW SAGITTAL SYSTEM 6

## (undated) DEVICE — PACK BASIC SETUP SC BR

## (undated) DEVICE — UNDERPAD ULTRASORBS ADV 30X36

## (undated) DEVICE — GLOVE SURG BIOGEL LATEX SZ 7.5

## (undated) DEVICE — SPONGE COTTON TRAY 4X4IN

## (undated) DEVICE — UNDERGLOVES BIOGEL PI SIZE 7.5

## (undated) DEVICE — APPLICATOR CHLORAPREP ORN 26ML

## (undated) DEVICE — DRAPE ORTH SPLIT 77X108IN

## (undated) DEVICE — DRAPE INCISE IOBAN 2 23X33IN

## (undated) DEVICE — DRAPE STERI INSTRUMENT 1018

## (undated) DEVICE — SUT STRATAFIX MON PS2 3-0 12IN

## (undated) DEVICE — KIT IRR SUCTION HND PIECE

## (undated) DEVICE — SUT VICRYL 2-0 27 CT-1